# Patient Record
Sex: MALE | Race: WHITE | NOT HISPANIC OR LATINO | Employment: OTHER | URBAN - METROPOLITAN AREA
[De-identification: names, ages, dates, MRNs, and addresses within clinical notes are randomized per-mention and may not be internally consistent; named-entity substitution may affect disease eponyms.]

---

## 2017-04-09 ENCOUNTER — HOSPITAL ENCOUNTER (EMERGENCY)
Facility: HOSPITAL | Age: 57
Discharge: HOME | End: 2017-04-09
Attending: EMERGENCY MEDICINE | Admitting: EMERGENCY MEDICINE

## 2017-04-09 ENCOUNTER — APPOINTMENT (OUTPATIENT)
Dept: GENERAL RADIOLOGY | Facility: HOSPITAL | Age: 57
End: 2017-04-09

## 2017-04-09 VITALS
TEMPERATURE: 97.7 F | WEIGHT: 210 LBS | RESPIRATION RATE: 18 BRPM | DIASTOLIC BLOOD PRESSURE: 79 MMHG | HEART RATE: 59 BPM | BODY MASS INDEX: 28.44 KG/M2 | HEIGHT: 72 IN | OXYGEN SATURATION: 97 % | SYSTOLIC BLOOD PRESSURE: 117 MMHG

## 2017-04-09 DIAGNOSIS — I42.2 HYPERTROPHIC CARDIOMYOPATHY (HCC): ICD-10-CM

## 2017-04-09 DIAGNOSIS — I50.23 ACUTE ON CHRONIC SYSTOLIC CONGESTIVE HEART FAILURE (HCC): Primary | ICD-10-CM

## 2017-04-09 LAB
ALBUMIN SERPL-MCNC: 4.4 G/DL (ref 3.5–5.2)
ALBUMIN/GLOB SERPL: 1.5 G/DL
ALP SERPL-CCNC: 54 U/L (ref 39–117)
ALT SERPL W P-5'-P-CCNC: 18 U/L (ref 1–41)
ANION GAP SERPL CALCULATED.3IONS-SCNC: 14 MMOL/L
AST SERPL-CCNC: 24 U/L (ref 1–40)
BASOPHILS # BLD AUTO: 0.03 10*3/MM3 (ref 0–0.2)
BASOPHILS NFR BLD AUTO: 0.5 % (ref 0–1.5)
BILIRUB SERPL-MCNC: 0.5 MG/DL (ref 0.1–1.2)
BUN BLD-MCNC: 20 MG/DL (ref 6–20)
BUN/CREAT SERPL: 17.9 (ref 7–25)
CALCIUM SPEC-SCNC: 9.4 MG/DL (ref 8.6–10.5)
CHLORIDE SERPL-SCNC: 101 MMOL/L (ref 98–107)
CO2 SERPL-SCNC: 27 MMOL/L (ref 22–29)
CREAT BLD-MCNC: 1.12 MG/DL (ref 0.76–1.27)
DEPRECATED RDW RBC AUTO: 49.9 FL (ref 37–54)
EOSINOPHIL # BLD AUTO: 0.21 10*3/MM3 (ref 0–0.7)
EOSINOPHIL NFR BLD AUTO: 3.4 % (ref 0.3–6.2)
ERYTHROCYTE [DISTWIDTH] IN BLOOD BY AUTOMATED COUNT: 14.8 % (ref 11.5–14.5)
GFR SERPL CREATININE-BSD FRML MDRD: 68 ML/MIN/1.73
GLOBULIN UR ELPH-MCNC: 2.9 GM/DL
GLUCOSE BLD-MCNC: 101 MG/DL (ref 65–99)
HCT VFR BLD AUTO: 41.2 % (ref 40.4–52.2)
HGB BLD-MCNC: 13.4 G/DL (ref 13.7–17.6)
HOLD SPECIMEN: NORMAL
IMM GRANULOCYTES # BLD: 0.02 10*3/MM3 (ref 0–0.03)
IMM GRANULOCYTES NFR BLD: 0.3 % (ref 0–0.5)
INR PPP: 1.42 (ref 0.9–1.1)
LYMPHOCYTES # BLD AUTO: 1.24 10*3/MM3 (ref 0.9–4.8)
LYMPHOCYTES NFR BLD AUTO: 20 % (ref 19.6–45.3)
MCH RBC QN AUTO: 29.8 PG (ref 27–32.7)
MCHC RBC AUTO-ENTMCNC: 32.5 G/DL (ref 32.6–36.4)
MCV RBC AUTO: 91.8 FL (ref 79.8–96.2)
MONOCYTES # BLD AUTO: 0.42 10*3/MM3 (ref 0.2–1.2)
MONOCYTES NFR BLD AUTO: 6.8 % (ref 5–12)
NEUTROPHILS # BLD AUTO: 4.27 10*3/MM3 (ref 1.9–8.1)
NEUTROPHILS NFR BLD AUTO: 69 % (ref 42.7–76)
NT-PROBNP SERPL-MCNC: 1243 PG/ML (ref 5–900)
PLATELET # BLD AUTO: 140 10*3/MM3 (ref 140–500)
PMV BLD AUTO: 10.4 FL (ref 6–12)
POTASSIUM BLD-SCNC: 4.3 MMOL/L (ref 3.5–5.2)
PROT SERPL-MCNC: 7.3 G/DL (ref 6–8.5)
PROTHROMBIN TIME: 16.8 SECONDS (ref 11.7–14.2)
RBC # BLD AUTO: 4.49 10*6/MM3 (ref 4.6–6)
SODIUM BLD-SCNC: 142 MMOL/L (ref 136–145)
TROPONIN T SERPL-MCNC: <0.01 NG/ML (ref 0–0.03)
WBC NRBC COR # BLD: 6.19 10*3/MM3 (ref 4.5–10.7)
WHOLE BLOOD HOLD SPECIMEN: NORMAL

## 2017-04-09 PROCEDURE — 85610 PROTHROMBIN TIME: CPT | Performed by: EMERGENCY MEDICINE

## 2017-04-09 PROCEDURE — 80053 COMPREHEN METABOLIC PANEL: CPT | Performed by: EMERGENCY MEDICINE

## 2017-04-09 PROCEDURE — 36415 COLL VENOUS BLD VENIPUNCTURE: CPT | Performed by: EMERGENCY MEDICINE

## 2017-04-09 PROCEDURE — 25010000002 FUROSEMIDE PER 20 MG: Performed by: EMERGENCY MEDICINE

## 2017-04-09 PROCEDURE — 71020 HC CHEST PA AND LATERAL: CPT

## 2017-04-09 PROCEDURE — 93005 ELECTROCARDIOGRAM TRACING: CPT

## 2017-04-09 PROCEDURE — 84484 ASSAY OF TROPONIN QUANT: CPT | Performed by: EMERGENCY MEDICINE

## 2017-04-09 PROCEDURE — 83880 ASSAY OF NATRIURETIC PEPTIDE: CPT | Performed by: EMERGENCY MEDICINE

## 2017-04-09 PROCEDURE — 93010 ELECTROCARDIOGRAM REPORT: CPT | Performed by: INTERNAL MEDICINE

## 2017-04-09 PROCEDURE — 85025 COMPLETE CBC W/AUTO DIFF WBC: CPT | Performed by: EMERGENCY MEDICINE

## 2017-04-09 PROCEDURE — 99284 EMERGENCY DEPT VISIT MOD MDM: CPT

## 2017-04-09 PROCEDURE — 96374 THER/PROPH/DIAG INJ IV PUSH: CPT

## 2017-04-09 RX ORDER — ASPIRIN 325 MG
325 TABLET ORAL ONCE
Status: DISCONTINUED | OUTPATIENT
Start: 2017-04-09 | End: 2017-04-09

## 2017-04-09 RX ORDER — SODIUM CHLORIDE 0.9 % (FLUSH) 0.9 %
10 SYRINGE (ML) INJECTION AS NEEDED
Status: DISCONTINUED | OUTPATIENT
Start: 2017-04-09 | End: 2017-04-09 | Stop reason: HOSPADM

## 2017-04-09 RX ORDER — GABAPENTIN 300 MG/1
300 CAPSULE ORAL 3 TIMES DAILY
COMMUNITY

## 2017-04-09 RX ORDER — LORAZEPAM 1 MG/1
1 TABLET ORAL ONCE
Status: COMPLETED | OUTPATIENT
Start: 2017-04-09 | End: 2017-04-09

## 2017-04-09 RX ORDER — LORAZEPAM 0.5 MG/1
0.5 TABLET ORAL 2 TIMES DAILY
Qty: 6 TABLET | Refills: 0 | Status: SHIPPED | OUTPATIENT
Start: 2017-04-09

## 2017-04-09 RX ORDER — WARFARIN SODIUM 5 MG/1
7.5 TABLET ORAL
COMMUNITY

## 2017-04-09 RX ORDER — FUROSEMIDE 10 MG/ML
40 INJECTION INTRAMUSCULAR; INTRAVENOUS ONCE
Status: COMPLETED | OUTPATIENT
Start: 2017-04-09 | End: 2017-04-09

## 2017-04-09 RX ADMIN — FUROSEMIDE 40 MG: 10 INJECTION, SOLUTION INTRAMUSCULAR; INTRAVENOUS at 17:33

## 2017-04-09 RX ADMIN — LORAZEPAM 1 MG: 1 TABLET ORAL at 17:27

## 2017-04-09 NOTE — ED NOTES
Pt states he has gained 20 pounds over the last 4 days.  Pt also started having cp today.     Teetee Acuña RN  04/09/17 4748

## 2017-04-09 NOTE — ED PROVIDER NOTES
EMERGENCY DEPARTMENT ENCOUNTER    CHIEF COMPLAINT  Chief Complaint: Edema  History given by: Pt  History limited by: None  Room Number: 11/11  PMD: Duke Razo DO  Cardiologist - Dr. Bear    HPI:  Pt is a 56 y.o. male with a history of CHF and hypertrophic cardio myelopathy  who presents complaining of BLE and abdominal edema onset 1 week ago. He states he has gained 20 pounds over the last 4 days. He also c/o SOB and chest pain. Pt denies nausea, vomiting, urinary symptoms or any other complaints. He also states he feels anxious and requests ativan.     Duration:  1 week  Onset: gradual  Timing: constant  Location: abd and BLE  Radiation: none  Quality: edema  Intensity/Severity: moderate  Progression: gradually worsening  Associated Symptoms: SOB, chest pain, weight gain  Aggravating Factors: none  Alleviating Factors: none  Previous Episodes: hx of  CHF and hypertrophic cardio myelopathy  Treatment before arrival: none    PAST MEDICAL HISTORY  Active Ambulatory Problems     Diagnosis Date Noted   • Elevated alanine aminotransferase (ALT) level 01/21/2016   • Anemia 01/21/2016   • Anxiety 01/21/2016   • Coronary arteriosclerosis in native artery 01/21/2016   • Cobalamin deficiency 01/21/2016   • Mass of breast 01/21/2016   • Chronic kidney disease 01/21/2016   • Congestive heart failure 01/21/2016   • Constipation 01/21/2016   • Gastroesophageal reflux disease 01/21/2016   • Fatigue 01/21/2016   • Gastric ulcer 01/21/2016   • Gynecomastia 01/21/2016   • History of alcohol abuse 01/21/2016   • Hyperlipidemia 01/21/2016   • Hypogonadism in male 01/21/2016   • Major depressive disorder, recurrent episode 01/21/2016   • Primary insomnia 01/21/2016   • Temporary cerebral vascular dysfunction 01/21/2016   • Essential hypertension 01/21/2016   • Benzodiazepine misuse 03/18/2016     Resolved Ambulatory Problems     Diagnosis Date Noted   • No Resolved Ambulatory Problems     Past Medical History:   Diagnosis  Date   • Alcoholism    • Atrial fibrillation    • Constipation    • Depression    • Depression with anxiety    • Obstructive hypertrophic cardiomyopathy    • Persistent insomnia    • Solitary pulmonary nodule on lung CT        PAST SURGICAL HISTORY  Past Surgical History:   Procedure Laterality Date   • ADENOIDECTOMY     • CARDIAC SURGERY     • HEMORRHOIDECTOMY     • OTHER SURGICAL HISTORY      PTCA: DIAGNOSTIC CATH CORONARY DOMINANCE   • OTHER SURGICAL HISTORY      TONSILLECTOMY WITH ADENOIDECTOMY   • TONSILLECTOMY         FAMILY HISTORY  Family History   Problem Relation Age of Onset   • Cardiomyopathy Brother        SOCIAL HISTORY  Social History     Social History   • Marital status: Single     Spouse name: N/A   • Number of children: N/A   • Years of education: N/A     Occupational History   • Not on file.     Social History Main Topics   • Smoking status: Current Every Day Smoker     Packs/day: 1.00     Types: Cigarettes   • Smokeless tobacco: Not on file   • Alcohol use Yes      Comment: former alcoholic. drinks occassionally.   • Drug use: No   • Sexual activity: Defer     Other Topics Concern   • Not on file     Social History Narrative   • No narrative on file       ALLERGIES  Review of patient's allergies indicates no known allergies.    REVIEW OF SYSTEMS  Review of Systems   Constitutional: Positive for unexpected weight change ( gain). Negative for activity change, appetite change and fever.   HENT: Negative for congestion and sore throat.    Eyes: Negative.    Respiratory: Positive for shortness of breath. Negative for cough.    Cardiovascular: Positive for chest pain and leg swelling.   Gastrointestinal: Positive for abdominal distention. Negative for abdominal pain, diarrhea and vomiting.   Endocrine: Negative.    Genitourinary: Negative for decreased urine volume and dysuria.   Musculoskeletal: Negative for neck pain.   Skin: Negative for rash and wound.   Allergic/Immunologic: Negative.     Neurological: Negative for weakness, numbness and headaches.   Hematological: Negative.    Psychiatric/Behavioral: The patient is nervous/anxious ( mild).    All other systems reviewed and are negative.      PHYSICAL EXAM  ED Triage Vitals   Temp Heart Rate Resp BP SpO2   04/09/17 1526 04/09/17 1526 04/09/17 1526 04/09/17 1529 04/09/17 1526   97.7 °F (36.5 °C) 77 18 151/100 98 %      Temp src Heart Rate Source Patient Position BP Location FiO2 (%)   04/09/17 1526 04/09/17 1526 04/09/17 1653 04/09/17 1653 --   Tympanic Monitor Sitting Right arm        Physical Exam   Constitutional: He is oriented to person, place, and time and well-developed, well-nourished, and in no distress.   HENT:   Head: Normocephalic and atraumatic.   Eyes: EOM are normal. Pupils are equal, round, and reactive to light.   Neck: Normal range of motion. Neck supple. No JVD present. Carotid bruit is not present.   Cardiovascular: Normal rate, regular rhythm, normal heart sounds and intact distal pulses.    Pulmonary/Chest: Effort normal and breath sounds normal. No respiratory distress.   Abdominal: Soft. There is no tenderness. There is no rebound and no guarding.   Musculoskeletal: Normal range of motion. He exhibits edema ( mild, at bilateral ankles).        Right lower leg: He exhibits no tenderness.        Left lower leg: He exhibits no tenderness.   Neurological: He is alert and oriented to person, place, and time. He has normal sensation and normal strength.   Skin: Skin is warm and dry.   Psychiatric: Mood and affect normal.   Nursing note and vitals reviewed.      LAB RESULTS  Lab Results (last 24 hours)     Procedure Component Value Units Date/Time    CBC & Differential [[id number]] Collected:  04/09/17 1549    Specimen:  Blood Updated:  04/09/17 1608    Narrative:       The following orders were created for panel order CBC & Differential.  Procedure                               Abnormality         Status                     ---------                                -----------         ------                     CBC Auto Differential[[id number]]         Abnormal            Final result                 Please view results for these tests on the individual orders.    Comprehensive Metabolic Panel [[id number]]  (Abnormal) Collected:  04/09/17 1549    Specimen:  Blood Updated:  04/09/17 1622     Glucose 101 (H) mg/dL      BUN 20 mg/dL      Creatinine 1.12 mg/dL      Sodium 142 mmol/L      Potassium 4.3 mmol/L      Chloride 101 mmol/L      CO2 27.0 mmol/L      Calcium 9.4 mg/dL      Total Protein 7.3 g/dL      Albumin 4.40 g/dL      ALT (SGPT) 18 U/L      AST (SGOT) 24 U/L      Alkaline Phosphatase 54 U/L      Total Bilirubin 0.5 mg/dL      eGFR Non African Amer 68 mL/min/1.73      Globulin 2.9 gm/dL      A/G Ratio 1.5 g/dL      BUN/Creatinine Ratio 17.9     Anion Gap 14.0 mmol/L     Troponin [[id number]]  (Normal) Collected:  04/09/17 1549    Specimen:  Blood Updated:  04/09/17 1622     Troponin T <0.010 ng/mL     Narrative:       Troponin T Reference Ranges:  Less than 0.03 ng/mL:    Negative for AMI  0.03 to 0.09 ng/mL:      Indeterminant for AMI  Greater than 0.09 ng/mL: Positive for AMI    Protime-INR [[id number]]  (Abnormal) Collected:  04/09/17 1549    Specimen:  Blood Updated:  04/09/17 1620     Protime 16.8 (H) Seconds      INR 1.42 (H)    CBC Auto Differential [[id number]]  (Abnormal) Collected:  04/09/17 1549    Specimen:  Blood Updated:  04/09/17 1608     WBC 6.19 10*3/mm3      RBC 4.49 (L) 10*6/mm3      Hemoglobin 13.4 (L) g/dL      Hematocrit 41.2 %      MCV 91.8 fL      MCH 29.8 pg      MCHC 32.5 (L) g/dL      RDW 14.8 (H) %      RDW-SD 49.9 fl      MPV 10.4 fL      Platelets 140 10*3/mm3      Neutrophil % 69.0 %      Lymphocyte % 20.0 %      Monocyte % 6.8 %      Eosinophil % 3.4 %      Basophil % 0.5 %      Immature Grans % 0.3 %      Neutrophils, Absolute 4.27 10*3/mm3      Lymphocytes, Absolute 1.24 10*3/mm3      Monocytes, Absolute 0.42  10*3/mm3      Eosinophils, Absolute 0.21 10*3/mm3      Basophils, Absolute 0.03 10*3/mm3      Immature Grans, Absolute 0.02 10*3/mm3     BNP [[id number]]  (Abnormal) Collected:  04/09/17 1549    Specimen:  Blood Updated:  04/09/17 1739     proBNP 1243.0 (H) pg/mL     Narrative:       Among patients with dyspnea, NT-proBNP is highly sensitive for the detection of acute congestive heart failure. In addition NT-proBNP of <300 pg/ml effectively rules out acute congestive heart failure with 99% negative predictive value.          I ordered the above labs and reviewed the results    RADIOLOGY  XR Chest 2 View              I ordered the above noted radiological studies. Interpreted by radiologist. Reviewed by me in PACS.       PROCEDURES  Procedures  EKG           EKG time: 1533  Rhythm/Rate: NSR, 70  P waves and AK: First degree AV block  Left atrial enlargement  QRS, axis: Poor R wave progression  LBBB   ST and T waves: ST changes     Interpreted Contemporaneously by me, independently viewed  unchanged compared to prior 6/1/15      PROGRESS AND CONSULTS  ED Course     1528  Ordered EKG, blood work and CXR for further evaluation. Ordered aspirin for chest pain.     1721  Ordered Ativan for anxiety, per pt's request, and Lasix for edema.     1759  Discussed all results including slightlying elevated BNP levels. Discussed plan to increase lasix and dishcarge to f/u with Dr. Bear (Cardiology). Pt understands and agrees with plan. All questions addressed.     MEDICAL DECISION MAKING  Results were reviewed/discussed with the patient and they were also made aware of online access. Pt also made aware that some labs, such as cultures, will not be resulted during ER visit and follow up with PMD is necessary.     MDM  Number of Diagnoses or Management Options  Acute on chronic systolic congestive heart failure:   Hypertrophic cardiomyopathy:      Amount and/or Complexity of Data Reviewed  Clinical lab tests: ordered and reviewed  (Pro BNP - 1243  Troponin <0.010)  Tests in the radiology section of CPT®: reviewed and ordered (CXR - negative)  Tests in the medicine section of CPT®: ordered and reviewed (See procedure note for EKG interpretation)  Decide to obtain previous medical records or to obtain history from someone other than the patient: yes           DIAGNOSIS  Final diagnoses:   Acute on chronic systolic congestive heart failure   Hypertrophic cardiomyopathy       DISPOSITION  DISCHARGE    Patient discharged in stable condition.    Reviewed implications of results, diagnosis, meds, responsibility to follow up, warning signs and symptoms of possible worsening, potential complications and reasons to return to ER.    Patient/Family voiced understanding of above instructions.    Discussed plan for discharge, as there is no emergent indication for admission.  Pt/family is agreeable and understands need for follow up and repeat testing.  Pt is aware that discharge does not mean that nothing is wrong but it indicates no emergency is present that requires admission and they must continue care with follow-up as given below or physician of their choice.     FOLLOW-UP  Duke Razo DO  [address]  [facility] [id number] [phone]    Schedule an appointment as soon as possible for a visit      Hu Bear MD  [address]  [facility] [id number] [phone]    Schedule an appointment as soon as possible for a visit           Medication List      New Prescriptions          LORazepam 0.5 MG tablet   Commonly known as:  ATIVAN   Take 1 tablet by mouth 2 (Two) Times a Day.         Stop          atorvastatin 80 MG tablet   Commonly known as:  LIPITOR       busPIRone 15 MG tablet   Commonly known as:  BUSPAR       docusate sodium 100 MG capsule   Commonly known as:  COLACE       hydrOXYzine 50 MG tablet   Commonly known as:  ATARAX       multivitamin capsule       pantoprazole 40 MG EC tablet   Commonly known as:   PROTONIX               Latest Documented Vital Signs:  As of 6:03 PM  BP- 114/78 HR- 59 Temp- 97.7 °F (36.5 °C) (Tympanic) O2 sat- 97%    --  Documentation assistance provided by franci Clarke for Dr. Rojo.  Information recorded by the scribe was done at my direction and has been verified and validated by me.          Tereza Clarke  04/09/17 0696       Cody Rojo MD  04/09/17 2043

## 2017-04-12 ENCOUNTER — TELEPHONE (OUTPATIENT)
Dept: SOCIAL WORK | Facility: HOSPITAL | Age: 57
End: 2017-04-12

## 2017-04-12 NOTE — TELEPHONE ENCOUNTER
ED follow-up phone call. States that he increased Lasix dose for three days as advised, and is taking prescribed Ativan. States he is feeling better. Reminded to make f/u ino't w/ both cardiologist and PCP. No questions/concerns

## 2017-05-07 ENCOUNTER — HOSPITAL ENCOUNTER (EMERGENCY)
Dept: HOSPITAL 23 - CED | Age: 57
Discharge: HOME | End: 2017-05-07
Payer: MEDICARE

## 2017-05-07 DIAGNOSIS — F41.9: ICD-10-CM

## 2017-05-07 DIAGNOSIS — F10.129: ICD-10-CM

## 2017-05-07 DIAGNOSIS — I10: ICD-10-CM

## 2017-05-07 DIAGNOSIS — R10.13: Primary | ICD-10-CM

## 2017-05-07 LAB
ALCOHOL BLOOD: 45 MG/DL
AMYLASE: 30 U/L
BARBITURATES UR QL SCN: 0.7 MG/DL
BARBITURATES UR QL SCN: 4.3 G/DL
BARBITURATES: (no result)
BASOPHIL#: 0.1 X10E3
BASOPHIL%: 2.1 %
BENZODIAZ UR QL SCN: 18 U/L
BENZODIAZ UR QL SCN: 22 U/L
BENZODIAZEPINES: (no result)
BLOOD UREA NITROGEN: 16 MG/DL
BUN/CREATININE RATIO: 12.3
BZE UR QL SCN: 50 U/L
CALCIUM SERUM: 8.8 MG/DL
CK MB SERPL-RTO: 14.9 %
CK MB SERPL-RTO: 32.3 G/DL
COCAINE: (no result)
CREATININE SERUM: 1.3 MG/DL
DIFF IND: NO
DX ICD CODE: (no result)
DX ICD CODE: (no result)
EOSINOPHIL#: 0.2 X10E3
EOSINOPHIL%: 4.2 %
GENTAMICIN PEAK SERPL-MCNC: NO MG/L
GLOM FILT RATE ESTIMATED: 61.4 ML/MIN
GLUCOSE FASTING: 93 MG/DL
HEMATOCRIT: 39.3 %
HEMOGLOBIN: 12.7 GM/DL
HIV1+2 AB SPEC QL IA.RAPID: 38.5 SECONDS
INR: 2.2
KETONES UR QL: 106 MMOL/L
KETONES UR QL: 26 MMOL/L
LIPASE: 49 U/L
LYMPHOCYTE#: 1.4 X10E3
LYMPHOCYTE%: 26.2 %
MEAN CELL VOLUME: 89.5 FL
MEAN CORPUSCULAR HEMOGLOBIN: 28.9 PG
MEAN PLATELET VOLUME: 7.6 FL
METHADONE UR QL SCN: 1.5 NG/ML
METHADONE UR QL SCN: 1.8 NG/ML
MONOCYTE#: 0.5 X10E3
MONOCYTE%: 9.2 %
NEUTROPHIL#: 3.2 X10E3
NEUTROPHIL%: 58.3 %
OPIATES: (no result)
PLATELET COUNT: 161 X10E3
POC - TROPONIN: <0.05 NG/ML
POC - TROPONIN: <0.05 NG/ML
POTASSIUM: 4.2 MMOL/L
PROTEIN TOTAL SERUM: 7.3 G/DL
PROTHROMBIN TIME (PATIENT): 24.1 SECONDS
RED BLOOD COUNT: 4.39 X10E
SODIUM: 140 MMOL/L
TRICYCLIC ANTIDEPRESSANTS: (no result)
U METHADONE: (no result)
URINE APPEARANCE: CLEAR
URINE BILIRUBIN: (no result)
URINE BLOOD: (no result)
URINE COLOR: YELLOW
URINE GLUCOSE: (no result) MG/DL
URINE KETONE: (no result)
URINE LEUKOCYTE ESTERASE: (no result)
URINE NITRATE: (no result)
URINE PH: 5
URINE PROTEIN: (no result)
URINE SOURCE: (no result)
URINE SPECIFIC GRAVITY: 1.01
URINE UROBILINOGEN: 0.2 MG/DL
WHITE BLOOD COUNT: 5.5 X10E3

## 2017-05-07 PROCEDURE — C9113 INJ PANTOPRAZOLE SODIUM, VIA: HCPCS

## 2017-05-07 PROCEDURE — G0480 DRUG TEST DEF 1-7 CLASSES: HCPCS

## 2017-08-06 ENCOUNTER — APPOINTMENT (OUTPATIENT)
Dept: GENERAL RADIOLOGY | Facility: HOSPITAL | Age: 57
End: 2017-08-06

## 2017-08-06 ENCOUNTER — APPOINTMENT (OUTPATIENT)
Dept: CT IMAGING | Facility: HOSPITAL | Age: 57
End: 2017-08-06

## 2017-08-06 ENCOUNTER — HOSPITAL ENCOUNTER (EMERGENCY)
Facility: HOSPITAL | Age: 57
Discharge: HOME | End: 2017-08-06
Attending: EMERGENCY MEDICINE | Admitting: EMERGENCY MEDICINE

## 2017-08-06 VITALS
DIASTOLIC BLOOD PRESSURE: 87 MMHG | WEIGHT: 195 LBS | BODY MASS INDEX: 26.41 KG/M2 | OXYGEN SATURATION: 98 % | TEMPERATURE: 98.4 F | SYSTOLIC BLOOD PRESSURE: 134 MMHG | HEART RATE: 62 BPM | RESPIRATION RATE: 17 BRPM | HEIGHT: 72 IN

## 2017-08-06 DIAGNOSIS — F41.9 ANXIETY: ICD-10-CM

## 2017-08-06 DIAGNOSIS — S39.012A LUMBAR STRAIN, INITIAL ENCOUNTER: Primary | ICD-10-CM

## 2017-08-06 DIAGNOSIS — R00.2 PALPITATIONS: ICD-10-CM

## 2017-08-06 LAB
ALBUMIN SERPL-MCNC: 4.9 G/DL (ref 3.5–5.2)
ALBUMIN/GLOB SERPL: 1.8 G/DL
ALP SERPL-CCNC: 45 U/L (ref 39–117)
ALT SERPL W P-5'-P-CCNC: 21 U/L (ref 1–41)
ANION GAP SERPL CALCULATED.3IONS-SCNC: 16.9 MMOL/L
AST SERPL-CCNC: 18 U/L (ref 1–40)
BASOPHILS # BLD AUTO: 0.03 10*3/MM3 (ref 0–0.2)
BASOPHILS NFR BLD AUTO: 0.5 % (ref 0–1.5)
BILIRUB SERPL-MCNC: 0.8 MG/DL (ref 0.1–1.2)
BILIRUB UR QL STRIP: NEGATIVE
BUN BLD-MCNC: 14 MG/DL (ref 6–20)
BUN/CREAT SERPL: 13.7 (ref 7–25)
CALCIUM SPEC-SCNC: 10 MG/DL (ref 8.6–10.5)
CHLORIDE SERPL-SCNC: 99 MMOL/L (ref 98–107)
CLARITY UR: CLEAR
CO2 SERPL-SCNC: 24.1 MMOL/L (ref 22–29)
COLOR UR: ABNORMAL
CREAT BLD-MCNC: 1.02 MG/DL (ref 0.76–1.27)
DEPRECATED RDW RBC AUTO: 46.9 FL (ref 37–54)
EOSINOPHIL # BLD AUTO: 0.14 10*3/MM3 (ref 0–0.7)
EOSINOPHIL NFR BLD AUTO: 2.5 % (ref 0.3–6.2)
ERYTHROCYTE [DISTWIDTH] IN BLOOD BY AUTOMATED COUNT: 14 % (ref 11.5–14.5)
GFR SERPL CREATININE-BSD FRML MDRD: 76 ML/MIN/1.73
GLOBULIN UR ELPH-MCNC: 2.7 GM/DL
GLUCOSE BLD-MCNC: 88 MG/DL (ref 65–99)
GLUCOSE UR STRIP-MCNC: NEGATIVE MG/DL
HCT VFR BLD AUTO: 42.8 % (ref 40.4–52.2)
HGB BLD-MCNC: 13.8 G/DL (ref 13.7–17.6)
HGB UR QL STRIP.AUTO: NEGATIVE
HOLD SPECIMEN: NORMAL
HOLD SPECIMEN: NORMAL
IMM GRANULOCYTES # BLD: 0 10*3/MM3 (ref 0–0.03)
IMM GRANULOCYTES NFR BLD: 0 % (ref 0–0.5)
INR PPP: 2.48 (ref 0.9–1.1)
KETONES UR QL STRIP: ABNORMAL
LEUKOCYTE ESTERASE UR QL STRIP.AUTO: NEGATIVE
LYMPHOCYTES # BLD AUTO: 1.08 10*3/MM3 (ref 0.9–4.8)
LYMPHOCYTES NFR BLD AUTO: 19.2 % (ref 19.6–45.3)
MCH RBC QN AUTO: 29.4 PG (ref 27–32.7)
MCHC RBC AUTO-ENTMCNC: 32.2 G/DL (ref 32.6–36.4)
MCV RBC AUTO: 91.1 FL (ref 79.8–96.2)
MONOCYTES # BLD AUTO: 0.32 10*3/MM3 (ref 0.2–1.2)
MONOCYTES NFR BLD AUTO: 5.7 % (ref 5–12)
NEUTROPHILS # BLD AUTO: 4.05 10*3/MM3 (ref 1.9–8.1)
NEUTROPHILS NFR BLD AUTO: 72.1 % (ref 42.7–76)
NITRITE UR QL STRIP: NEGATIVE
PH UR STRIP.AUTO: 6 [PH] (ref 5–8)
PLATELET # BLD AUTO: 184 10*3/MM3 (ref 140–500)
PMV BLD AUTO: 9.9 FL (ref 6–12)
POTASSIUM BLD-SCNC: 4.3 MMOL/L (ref 3.5–5.2)
PROT SERPL-MCNC: 7.6 G/DL (ref 6–8.5)
PROT UR QL STRIP: NEGATIVE
PROTHROMBIN TIME: 26.1 SECONDS (ref 11.7–14.2)
RBC # BLD AUTO: 4.7 10*6/MM3 (ref 4.6–6)
SODIUM BLD-SCNC: 140 MMOL/L (ref 136–145)
SP GR UR STRIP: >=1.03 (ref 1–1.03)
TROPONIN T SERPL-MCNC: <0.01 NG/ML (ref 0–0.03)
UROBILINOGEN UR QL STRIP: ABNORMAL
WBC NRBC COR # BLD: 5.62 10*3/MM3 (ref 4.5–10.7)
WHOLE BLOOD HOLD SPECIMEN: NORMAL
WHOLE BLOOD HOLD SPECIMEN: NORMAL

## 2017-08-06 PROCEDURE — 96374 THER/PROPH/DIAG INJ IV PUSH: CPT

## 2017-08-06 PROCEDURE — 93010 ELECTROCARDIOGRAM REPORT: CPT | Performed by: INTERNAL MEDICINE

## 2017-08-06 PROCEDURE — 81003 URINALYSIS AUTO W/O SCOPE: CPT | Performed by: EMERGENCY MEDICINE

## 2017-08-06 PROCEDURE — 85610 PROTHROMBIN TIME: CPT

## 2017-08-06 PROCEDURE — 99284 EMERGENCY DEPT VISIT MOD MDM: CPT

## 2017-08-06 PROCEDURE — 25010000002 MORPHINE PER 10 MG: Performed by: EMERGENCY MEDICINE

## 2017-08-06 PROCEDURE — 93005 ELECTROCARDIOGRAM TRACING: CPT

## 2017-08-06 PROCEDURE — 71020 HC CHEST PA AND LATERAL: CPT

## 2017-08-06 PROCEDURE — 74176 CT ABD & PELVIS W/O CONTRAST: CPT

## 2017-08-06 PROCEDURE — 80053 COMPREHEN METABOLIC PANEL: CPT

## 2017-08-06 PROCEDURE — 85025 COMPLETE CBC W/AUTO DIFF WBC: CPT

## 2017-08-06 PROCEDURE — 84484 ASSAY OF TROPONIN QUANT: CPT

## 2017-08-06 RX ORDER — ASPIRIN 325 MG
325 TABLET ORAL ONCE
Status: DISCONTINUED | OUTPATIENT
Start: 2017-08-06 | End: 2017-08-06 | Stop reason: HOSPADM

## 2017-08-06 RX ORDER — SODIUM CHLORIDE 0.9 % (FLUSH) 0.9 %
10 SYRINGE (ML) INJECTION AS NEEDED
Status: DISCONTINUED | OUTPATIENT
Start: 2017-08-06 | End: 2017-08-06 | Stop reason: HOSPADM

## 2017-08-06 RX ORDER — ALPRAZOLAM 0.25 MG/1
0.25 TABLET ORAL ONCE
Status: COMPLETED | OUTPATIENT
Start: 2017-08-06 | End: 2017-08-06

## 2017-08-06 RX ORDER — METAXALONE 800 MG/1
800 TABLET ORAL 3 TIMES DAILY PRN
Qty: 15 TABLET | Refills: 0 | Status: SHIPPED | OUTPATIENT
Start: 2017-08-06

## 2017-08-06 RX ORDER — MORPHINE SULFATE 2 MG/ML
2 INJECTION, SOLUTION INTRAMUSCULAR; INTRAVENOUS ONCE
Status: COMPLETED | OUTPATIENT
Start: 2017-08-06 | End: 2017-08-06

## 2017-08-06 RX ADMIN — MORPHINE SULFATE 2 MG: 2 INJECTION, SOLUTION INTRAMUSCULAR; INTRAVENOUS at 14:38

## 2017-08-06 RX ADMIN — ALPRAZOLAM 0.25 MG: 0.25 TABLET ORAL at 14:02

## 2017-08-06 NOTE — ED PROVIDER NOTES
" EMERGENCY DEPARTMENT ENCOUNTER    CHIEF COMPLAINT  Chief Complaint: R lumbar pain  History given by: Pt  History limited by: Nothing  Room Number: 23/23  PMD: No Known Provider  PCP: Dr. Razo    HPI:  Pt is a 57 y.o. male who presents complaining of R lumbar pain onset three days ago. Pt states his pain is exacerbated with movement and leaning forward. He also complains of nausea and resolved RLQ pain but denies CP, SOA, weakness, numbness, incontinence and any other symptoms at this time. He reports he experienced palpitations one day ago that have resolved. Pt has a hx of nephrolithiasis. He states he is currently on Coumadin for hx of A-Fib.    Duration:  Three days  Onset: gradual  Timing: constant  Location: R  Radiation: none  Quality: \"pain\"  Intensity/Severity: moderate  Progression: unchanged  Associated Symptoms: nausea, resolved RLQ abd pain, palpitations  Aggravating Factors: movement, leaning forward  Alleviating Factors: none  Previous Episodes: Pt has a hx of nephrolithiasis.  Treatment before arrival: Pt received no treatment PTA.    PAST MEDICAL HISTORY  Active Ambulatory Problems     Diagnosis Date Noted   • Elevated alanine aminotransferase (ALT) level 01/21/2016   • Anemia 01/21/2016   • Anxiety 01/21/2016   • Coronary arteriosclerosis in native artery 01/21/2016   • Cobalamin deficiency 01/21/2016   • Mass of breast 01/21/2016   • Chronic kidney disease 01/21/2016   • Congestive heart failure 01/21/2016   • Constipation 01/21/2016   • Gastroesophageal reflux disease 01/21/2016   • Fatigue 01/21/2016   • Gastric ulcer 01/21/2016   • Gynecomastia 01/21/2016   • History of alcohol abuse 01/21/2016   • Hyperlipidemia 01/21/2016   • Hypogonadism in male 01/21/2016   • Major depressive disorder, recurrent episode 01/21/2016   • Primary insomnia 01/21/2016   • Temporary cerebral vascular dysfunction 01/21/2016   • Essential hypertension 01/21/2016   • Benzodiazepine misuse 03/18/2016     Resolved " Ambulatory Problems     Diagnosis Date Noted   • No Resolved Ambulatory Problems     Past Medical History:   Diagnosis Date   • Alcoholism    • Atrial fibrillation    • Constipation    • Depression    • Depression with anxiety    • Obstructive hypertrophic cardiomyopathy    • Persistent insomnia    • Solitary pulmonary nodule on lung CT        PAST SURGICAL HISTORY  Past Surgical History:   Procedure Laterality Date   • ADENOIDECTOMY     • CARDIAC SURGERY     • HEMORRHOIDECTOMY     • OTHER SURGICAL HISTORY      PTCA: DIAGNOSTIC CATH CORONARY DOMINANCE   • OTHER SURGICAL HISTORY      TONSILLECTOMY WITH ADENOIDECTOMY   • TONSILLECTOMY         FAMILY HISTORY  Family History   Problem Relation Age of Onset   • Cardiomyopathy Brother        SOCIAL HISTORY  Social History     Social History   • Marital status: Single     Spouse name: N/A   • Number of children: N/A   • Years of education: N/A     Occupational History   • Not on file.     Social History Main Topics   • Smoking status: Current Every Day Smoker     Packs/day: 1.00     Types: Cigarettes   • Smokeless tobacco: Not on file   • Alcohol use Yes      Comment: former alcoholic. drinks occassionally.   • Drug use: No   • Sexual activity: Defer     Other Topics Concern   • Not on file     Social History Narrative       ALLERGIES  Review of patient's allergies indicates no known allergies.    REVIEW OF SYSTEMS  Review of Systems   Constitutional: Negative for activity change, appetite change and fever.   HENT: Negative for congestion and sore throat.    Eyes: Negative.    Respiratory: Negative for cough and shortness of breath.    Cardiovascular: Positive for palpitations (resolved). Negative for chest pain and leg swelling.   Gastrointestinal: Positive for abdominal pain (RLQ (resolved)) and nausea. Negative for diarrhea and vomiting.   Endocrine: Negative.    Genitourinary: Negative for decreased urine volume and dysuria.   Musculoskeletal: Positive for back pain (R  lumbar). Negative for neck pain.   Skin: Negative for rash and wound.   Allergic/Immunologic: Negative.    Neurological: Negative for weakness, numbness and headaches.   Hematological: Negative.    Psychiatric/Behavioral: Negative.    All other systems reviewed and are negative.      PHYSICAL EXAM  ED Triage Vitals   Temp Heart Rate Resp BP SpO2   08/06/17 1101 08/06/17 1101 08/06/17 1101 08/06/17 1123 08/06/17 1101   97.8 °F (36.6 °C) 61 18 138/96 99 %      Temp src Heart Rate Source Patient Position BP Location FiO2 (%)   08/06/17 1101 -- 08/06/17 1123 08/06/17 1123 --   Tympanic  Sitting Right arm        Physical Exam   Constitutional: He is oriented to person, place, and time.  Non-toxic appearance. He appears distressed (mildly).   HENT:   Head: Normocephalic and atraumatic.   Eyes: EOM are normal.   Neck: Normal range of motion.   Cardiovascular: Normal rate and regular rhythm.    Murmur heard.   Systolic murmur is present with a grade of 2/6   Pulses:       Posterior tibial pulses are 2+ on the right side, and 2+ on the left side.   Pulmonary/Chest: Effort normal and breath sounds normal. No respiratory distress. He has no wheezes.   Abdominal: Soft. Bowel sounds are normal. There is no tenderness. There is no rebound, no guarding and no CVA tenderness.   Musculoskeletal: Normal range of motion. He exhibits no edema.        Lumbar back: He exhibits tenderness (right lumbar musculature). He exhibits no bony tenderness.   Strength, ROM, sens intact bilat lower ext   Neurological: He is alert and oriented to person, place, and time.   Pt has negative straight leg raises and intact pulses.   Skin: Skin is warm and dry.   Psychiatric: Affect normal.   Nursing note and vitals reviewed.      LAB RESULTS  Lab Results (last 24 hours)     Procedure Component Value Units Date/Time    CBC & Differential [[id number]] Collected:  08/06/17 1205    Specimen:  Blood Updated:  08/06/17 1224    Narrative:       The following  orders were created for panel order CBC & Differential.  Procedure                               Abnormality         Status                     ---------                               -----------         ------                     CBC Auto Differential[[id number]]        Abnormal            Final result                 Please view results for these tests on the individual orders.    Comprehensive Metabolic Panel [[id number]] Collected:  08/06/17 1205    Specimen:  Blood Updated:  08/06/17 1244     Glucose 88 mg/dL      BUN 14 mg/dL      Creatinine 1.02 mg/dL      Sodium 140 mmol/L      Potassium 4.3 mmol/L      Chloride 99 mmol/L      CO2 24.1 mmol/L      Calcium 10.0 mg/dL      Total Protein 7.6 g/dL      Albumin 4.90 g/dL      ALT (SGPT) 21 U/L      AST (SGOT) 18 U/L      Alkaline Phosphatase 45 U/L      Total Bilirubin 0.8 mg/dL      eGFR Non African Amer 76 mL/min/1.73      Globulin 2.7 gm/dL      A/G Ratio 1.8 g/dL      BUN/Creatinine Ratio 13.7     Anion Gap 16.9 mmol/L     Troponin [[id number]]  (Normal) Collected:  08/06/17 1205    Specimen:  Blood Updated:  08/06/17 1244     Troponin T <0.010 ng/mL     Narrative:       Troponin T Reference Ranges:  Less than 0.03 ng/mL:    Negative for AMI  0.03 to 0.09 ng/mL:      Indeterminant for AMI  Greater than 0.09 ng/mL: Positive for AMI    Protime-INR [[id number]]  (Abnormal) Collected:  08/06/17 1205    Specimen:  Blood Updated:  08/06/17 1236     Protime 26.1 (H) Seconds      INR 2.48 (H)    CBC Auto Differential [[id number]]  (Abnormal) Collected:  08/06/17 1205    Specimen:  Blood Updated:  08/06/17 1224     WBC 5.62 10*3/mm3      RBC 4.70 10*6/mm3      Hemoglobin 13.8 g/dL      Hematocrit 42.8 %      MCV 91.1 fL      MCH 29.4 pg      MCHC 32.2 (L) g/dL      RDW 14.0 %      RDW-SD 46.9 fl      MPV 9.9 fL      Platelets 184 10*3/mm3      Neutrophil % 72.1 %      Lymphocyte % 19.2 (L) %      Monocyte % 5.7 %      Eosinophil % 2.5 %      Basophil % 0.5 %      Immature  Grans % 0.0 %      Neutrophils, Absolute 4.05 10*3/mm3      Lymphocytes, Absolute 1.08 10*3/mm3      Monocytes, Absolute 0.32 10*3/mm3      Eosinophils, Absolute 0.14 10*3/mm3      Basophils, Absolute 0.03 10*3/mm3      Immature Grans, Absolute 0.00 10*3/mm3     Urinalysis With / Culture If Indicated [[id number]]  (Abnormal) Collected:  08/06/17 1241    Specimen:  Urine from Urine, Clean Catch Updated:  08/06/17 1300     Color, UA Dark Yellow (A)     Appearance, UA Clear     pH, UA 6.0     Specific Gravity, UA >=1.030     Glucose, UA Negative     Ketones, UA 15 mg/dL (1+) (A)     Bilirubin, UA Negative     Blood, UA Negative     Protein, UA Negative     Leuk Esterase, UA Negative     Nitrite, UA Negative     Urobilinogen, UA 0.2 E.U./dL    Narrative:       Urine microscopic not indicated.          I ordered the above labs and reviewed the results    RADIOLOGY  CT Abdomen Pelvis Without Contrast   Final Result           1. No urolithiasis or hydronephrosis. Mild bilateral perinephric fat   stranding suggests chronic change, correlate clinically to exclude   possibility of urinary infection.   2. No acute process of bowel is identified, follow-up as indications   persist.   3. Indeterminate liver and spleen low densities. Spleen is enlarged.   Enlarged prostate.       Discussed by telephone with Dr. Ken at time of interpretation, 1440 on   08/06/2017.       This report was finalized on 8/6/2017 2:47 PM by Dr. Dustin Huffman MD.          XR Chest 2 View   Final Result   No focal pulmonary consolidation. COPD change. Follow-up as   clinical indications persist.       This report was finalized on 8/6/2017 1:13 PM by Dr. Dustin Huffman MD.               I ordered the above noted radiological studies. Interpreted by radiologist. Reviewed by me in PACS.       PROCEDURES  Procedures      PROGRESS AND CONSULTS  ED Course     1158 Ordered EKG, XR Chest, Troponin, CMP, CBC, Protime-INR for further  "evaluation.    1200 Ordered 325mg ASA for pain relief.    1358 Ordered CT Abd/Pel for further evaluation. Pt requested \"something for his nerves\", declined pain meds, ordered .25mg Xanax for anxiety relief.    1426 nursing reports pt requesting pain meds - ordered 2mg morphine for pain relief.    1540 BP- 147/98 HR- 60 Temp- 97.8 °F (36.6 °C) (Tympanic) O2 sat- 96%. Rechecked the patient who is in NAD and is resting comfortably. There are no signs of worrisome arrhythmia in ER at this time. Suggested follow up with PCP. Will discharge. Pt understands and agrees with the plan. All questions answered.      MEDICAL DECISION MAKING  Results were reviewed/discussed with the patient and they were also made aware of online access. Pt also made aware that some labs, such as cultures, will not be resulted during ER visit and follow up with PMD is necessary.     MDM  Number of Diagnoses or Management Options     Amount and/or Complexity of Data Reviewed  Clinical lab tests: ordered and reviewed (INR - 2.48, WBC - 5.62, Troponin - <.010)  Tests in the radiology section of CPT®: ordered and reviewed (XR Chest shows COPD change but NAD. CT Abd/Pel shows no acute process of bowel, indeterminate liver and spleen, and no urolithiasis. There is mild bilateral perinephric fat stranding suggests chronic change.)  Decide to obtain previous medical records or to obtain history from someone other than the patient: yes  Review and summarize past medical records: yes  Independent visualization of images, tracings, or specimens: yes    Patient Progress  Patient progress: stable         DIAGNOSIS  Final diagnoses:   Lumbar strain, initial encounter   Anxiety   Palpitations       DISPOSITION  DISCHARGE    Patient discharged in stable condition.    Reviewed implications of results, diagnosis, meds, responsibility to follow up, warning signs and symptoms of possible worsening, potential complications and reasons to return to ER, including new " or worsening symptoms.    Patient/Family voiced understanding of above instructions.    Discussed plan for discharge, as there is no emergent indication for admission.  Pt/family is agreeable and understands need for follow up and repeat testing.  Pt is aware that discharge does not mean that nothing is wrong but it indicates no emergency is present that requires admission and they must continue care with follow-up as given below or physician of their choice.     FOLLOW-UP  [facility] PHYSIC REFERRAL SERVICE  [place] [id number] [phone]  Schedule an appointment as soon as possible for a visit in 3 days  EVEN IF WELL    Dr Jaylon bauman or PCP of your choice    Schedule an appointment as soon as possible for a visit in 3 days  EVEN IF WELL    Hu Bear MD  [address] [facility] [address]  [place] [id number] [phone]    Schedule an appointment as soon as possible for a visit in 3 days  EVEN IF WELL         Medication List      New Prescriptions          metaxalone 800 MG tablet   Commonly known as:  SKELAXIN   Take 1 tablet by mouth 3 (Three) Times a Day As Needed for Muscle Spasms   for up to 15 doses. Do not operate machinery         Stop          atorvastatin 80 MG tablet   Commonly known as:  LIPITOR       busPIRone 15 MG tablet   Commonly known as:  BUSPAR       clonazePAM 0.5 MG tablet   Commonly known as:  KlonoPIN       docusate sodium 100 MG capsule   Commonly known as:  COLACE       gabapentin 300 MG capsule   Commonly known as:  NEURONTIN       hydrOXYzine 50 MG tablet   Commonly known as:  ATARAX       LORazepam 0.5 MG tablet   Commonly known as:  ATIVAN       multivitamin capsule       pantoprazole 40 MG EC tablet   Commonly known as:  PROTONIX               Latest Documented Vital Signs:  As of 3:37 PM  BP- 147/98 HR- 60 Temp- 97.8 °F (36.6 °C) (Tympanic) O2 sat- 96%    --  Documentation assistance provided by franci Michael for Dr. Ken.  Information  recorded by the scribe was done at my direction and has been verified and validated by me.         Umer Michael  08/06/17 1537            8:12 PM 8/7/17  Attempted to call patient for follow-up to ensure he schedules an appointment with a primary care provider for follow-up of incidental findings on his CT scan and to ascertain how the patient is progressing.  Called home phone number provided by the patient and with no answer and unable to leave a message.  I have sent a note to care coordinators to try to follow up with the patient.           Viviana Ken MD  08/08/17 0069

## 2017-08-06 NOTE — ED NOTES
Pt. now reports pain in his chest is gone and he feels like he did a few years ago when he was having a kidney stone attack.     Hebert Streteer RN  08/06/17 3637

## 2017-08-06 NOTE — DISCHARGE INSTRUCTIONS
You are advised to follow closely with Dr Bear and Dr Raoz or Primary care provider of your choice in 2-3 days for recheck, final results of lab work and imaging testing, and further testing/treatment as needed.  Quit smoking  Drink plenty of fluids.  Heat and gentle stretching tl left lower back    Please return to the emergency department immediately with chest pain different than usual for you, shortness of air, abdominal pain, persistent vomiting/fever, blood in emesis or stool, lightheadedness/fainting, problems with speech, one sided weakness/numbness, new incontinence, problems with vision,  or for worsening of symptoms or other concerns.

## 2017-08-09 ENCOUNTER — TELEPHONE (OUTPATIENT)
Dept: SOCIAL WORK | Facility: HOSPITAL | Age: 57
End: 2017-08-09

## 2017-08-17 ENCOUNTER — TELEPHONE (OUTPATIENT)
Dept: SOCIAL WORK | Facility: HOSPITAL | Age: 57
End: 2017-08-17

## 2017-08-17 NOTE — TELEPHONE ENCOUNTER
ER F/U phone call:   Spoke with pt re:  Ct results. Pt has seen his PCP last week and has a follow up on 8-23-17. PCP made aware of CT results.Sabra King RN

## 2017-09-15 ENCOUNTER — HOSPITAL ENCOUNTER (OUTPATIENT)
Dept: HOSPITAL 23 - SMRI | Age: 57
Discharge: HOME | End: 2017-09-15
Attending: FAMILY MEDICINE
Payer: MEDICARE

## 2017-09-15 DIAGNOSIS — R93.2: ICD-10-CM

## 2017-09-15 DIAGNOSIS — R16.0: ICD-10-CM

## 2017-09-15 DIAGNOSIS — R93.5: Primary | ICD-10-CM

## 2017-09-15 DIAGNOSIS — D18.03: ICD-10-CM

## 2017-09-15 PROCEDURE — A9581 GADOXETATE DISODIUM INJ: HCPCS

## 2019-06-10 ENCOUNTER — ON CAMPUS - OUTPATIENT (OUTPATIENT)
Dept: URBAN - METROPOLITAN AREA HOSPITAL 108 | Facility: HOSPITAL | Age: 59
End: 2019-06-10

## 2019-06-10 DIAGNOSIS — I42.2 OTHER HYPERTROPHIC CARDIOMYOPATHY: ICD-10-CM

## 2019-06-10 DIAGNOSIS — F10.21 ALCOHOL DEPENDENCE, IN REMISSION: ICD-10-CM

## 2019-06-10 DIAGNOSIS — F17.200 NICOTINE DEPENDENCE, UNSPECIFIED, UNCOMPLICATED: ICD-10-CM

## 2019-06-10 DIAGNOSIS — R07.89 OTHER CHEST PAIN: ICD-10-CM

## 2019-06-10 DIAGNOSIS — I50.32 CHRONIC DIASTOLIC (CONGESTIVE) HEART FAILURE: ICD-10-CM

## 2019-06-10 DIAGNOSIS — D69.6 THROMBOCYTOPENIA, UNSPECIFIED: ICD-10-CM

## 2019-06-10 DIAGNOSIS — R11.0 NAUSEA: ICD-10-CM

## 2019-06-10 PROCEDURE — 99214 OFFICE O/P EST MOD 30 MIN: CPT

## 2019-06-26 ENCOUNTER — HOSPITAL ENCOUNTER (EMERGENCY)
Facility: HOSPITAL | Age: 59
Discharge: HOME | End: 2019-06-27
Attending: EMERGENCY MEDICINE | Admitting: EMERGENCY MEDICINE

## 2019-06-26 DIAGNOSIS — F10.10 ALCOHOL ABUSE: ICD-10-CM

## 2019-06-26 DIAGNOSIS — F10.930 ALCOHOL WITHDRAWAL SYNDROME WITHOUT COMPLICATION (HCC): Primary | ICD-10-CM

## 2019-06-26 LAB
ALBUMIN SERPL-MCNC: 4.4 G/DL (ref 3.5–5.2)
ALBUMIN/GLOB SERPL: 1.6 G/DL
ALP SERPL-CCNC: 62 U/L (ref 39–117)
ALT SERPL W P-5'-P-CCNC: 16 U/L (ref 1–41)
AMPHET+METHAMPHET UR QL: NEGATIVE
ANION GAP SERPL CALCULATED.3IONS-SCNC: 14.5 MMOL/L (ref 5–15)
AST SERPL-CCNC: 35 U/L (ref 1–40)
BARBITURATES UR QL SCN: NEGATIVE
BASOPHILS # BLD AUTO: 0.05 10*3/MM3 (ref 0–0.2)
BASOPHILS NFR BLD AUTO: 1.1 % (ref 0–1.5)
BENZODIAZ UR QL SCN: NEGATIVE
BILIRUB SERPL-MCNC: 0.4 MG/DL (ref 0.2–1.2)
BUN BLD-MCNC: 9 MG/DL (ref 6–20)
BUN/CREAT SERPL: 6.9 (ref 7–25)
CALCIUM SPEC-SCNC: 8.9 MG/DL (ref 8.6–10.5)
CANNABINOIDS SERPL QL: NEGATIVE
CHLORIDE SERPL-SCNC: 106 MMOL/L (ref 98–107)
CO2 SERPL-SCNC: 21.5 MMOL/L (ref 22–29)
COCAINE UR QL: NEGATIVE
CREAT BLD-MCNC: 1.3 MG/DL (ref 0.76–1.27)
DEPRECATED RDW RBC AUTO: 51.6 FL (ref 37–54)
EOSINOPHIL # BLD AUTO: 0.09 10*3/MM3 (ref 0–0.4)
EOSINOPHIL NFR BLD AUTO: 1.9 % (ref 0.3–6.2)
ERYTHROCYTE [DISTWIDTH] IN BLOOD BY AUTOMATED COUNT: 14.4 % (ref 12.3–15.4)
ETHANOL BLD-MCNC: 274 MG/DL (ref 0–10)
ETHANOL UR QL: 0.27 %
GFR SERPL CREATININE-BSD FRML MDRD: 57 ML/MIN/1.73
GLOBULIN UR ELPH-MCNC: 2.7 GM/DL
GLUCOSE BLD-MCNC: 106 MG/DL (ref 65–99)
HCT VFR BLD AUTO: 42.4 % (ref 37.5–51)
HGB BLD-MCNC: 13.4 G/DL (ref 13–17.7)
IMM GRANULOCYTES # BLD AUTO: 0 10*3/MM3 (ref 0–0.05)
IMM GRANULOCYTES NFR BLD AUTO: 0 % (ref 0–0.5)
INR PPP: 1.03 (ref 0.9–1.1)
LYMPHOCYTES # BLD AUTO: 1.24 10*3/MM3 (ref 0.7–3.1)
LYMPHOCYTES NFR BLD AUTO: 26.7 % (ref 19.6–45.3)
MAGNESIUM SERPL-MCNC: 2.2 MG/DL (ref 1.6–2.6)
MCH RBC QN AUTO: 30.9 PG (ref 26.6–33)
MCHC RBC AUTO-ENTMCNC: 31.6 G/DL (ref 31.5–35.7)
MCV RBC AUTO: 97.7 FL (ref 79–97)
METHADONE UR QL SCN: NEGATIVE
MONOCYTES # BLD AUTO: 0.3 10*3/MM3 (ref 0.1–0.9)
MONOCYTES NFR BLD AUTO: 6.5 % (ref 5–12)
NEUTROPHILS # BLD AUTO: 2.97 10*3/MM3 (ref 1.7–7)
NEUTROPHILS NFR BLD AUTO: 63.8 % (ref 42.7–76)
OPIATES UR QL: NEGATIVE
OXYCODONE UR QL SCN: NEGATIVE
PLATELET # BLD AUTO: 160 10*3/MM3 (ref 140–450)
PMV BLD AUTO: 9.5 FL (ref 6–12)
POTASSIUM BLD-SCNC: 3.3 MMOL/L (ref 3.5–5.2)
PROT SERPL-MCNC: 7.1 G/DL (ref 6–8.5)
PROTHROMBIN TIME: 13.2 SECONDS (ref 11.7–14.2)
RBC # BLD AUTO: 4.34 10*6/MM3 (ref 4.14–5.8)
SODIUM BLD-SCNC: 142 MMOL/L (ref 136–145)
WBC NRBC COR # BLD: 4.65 10*3/MM3 (ref 3.4–10.8)

## 2019-06-26 PROCEDURE — 80307 DRUG TEST PRSMV CHEM ANLYZR: CPT | Performed by: NURSE PRACTITIONER

## 2019-06-26 PROCEDURE — 99284 EMERGENCY DEPT VISIT MOD MDM: CPT

## 2019-06-26 PROCEDURE — 85025 COMPLETE CBC W/AUTO DIFF WBC: CPT | Performed by: NURSE PRACTITIONER

## 2019-06-26 PROCEDURE — 25010000002 LORAZEPAM PER 2 MG: Performed by: NURSE PRACTITIONER

## 2019-06-26 PROCEDURE — 83735 ASSAY OF MAGNESIUM: CPT | Performed by: NURSE PRACTITIONER

## 2019-06-26 PROCEDURE — 96376 TX/PRO/DX INJ SAME DRUG ADON: CPT

## 2019-06-26 PROCEDURE — 96366 THER/PROPH/DIAG IV INF ADDON: CPT

## 2019-06-26 PROCEDURE — 25010000002 LORAZEPAM PER 2 MG: Performed by: EMERGENCY MEDICINE

## 2019-06-26 PROCEDURE — 25010000002 MAGNESIUM SULFATE PER 500 MG OF MAGNESIUM: Performed by: NURSE PRACTITIONER

## 2019-06-26 PROCEDURE — 96365 THER/PROPH/DIAG IV INF INIT: CPT

## 2019-06-26 PROCEDURE — 85610 PROTHROMBIN TIME: CPT | Performed by: NURSE PRACTITIONER

## 2019-06-26 PROCEDURE — 80053 COMPREHEN METABOLIC PANEL: CPT | Performed by: NURSE PRACTITIONER

## 2019-06-26 PROCEDURE — 25010000002 THIAMINE PER 100 MG: Performed by: NURSE PRACTITIONER

## 2019-06-26 PROCEDURE — 96375 TX/PRO/DX INJ NEW DRUG ADDON: CPT

## 2019-06-26 RX ORDER — LORAZEPAM 2 MG/ML
2 INJECTION INTRAMUSCULAR ONCE
Status: COMPLETED | OUTPATIENT
Start: 2019-06-26 | End: 2019-06-26

## 2019-06-26 RX ORDER — LORAZEPAM 2 MG/ML
1 INJECTION INTRAMUSCULAR ONCE
Status: COMPLETED | OUTPATIENT
Start: 2019-06-26 | End: 2019-06-26

## 2019-06-26 RX ORDER — SODIUM CHLORIDE 0.9 % (FLUSH) 0.9 %
10 SYRINGE (ML) INJECTION AS NEEDED
Status: DISCONTINUED | OUTPATIENT
Start: 2019-06-26 | End: 2019-06-27 | Stop reason: HOSPADM

## 2019-06-26 RX ADMIN — LORAZEPAM 1 MG: 2 INJECTION INTRAMUSCULAR; INTRAVENOUS at 19:18

## 2019-06-26 RX ADMIN — FOLIC ACID 1000 ML/HR: 5 INJECTION, SOLUTION INTRAMUSCULAR; INTRAVENOUS; SUBCUTANEOUS at 20:07

## 2019-06-26 RX ADMIN — LORAZEPAM 2 MG: 2 INJECTION INTRAMUSCULAR; INTRAVENOUS at 21:15

## 2019-06-27 VITALS
HEART RATE: 80 BPM | RESPIRATION RATE: 16 BRPM | SYSTOLIC BLOOD PRESSURE: 122 MMHG | TEMPERATURE: 98.2 F | WEIGHT: 185 LBS | OXYGEN SATURATION: 97 % | HEIGHT: 72 IN | BODY MASS INDEX: 25.06 KG/M2 | DIASTOLIC BLOOD PRESSURE: 78 MMHG

## 2019-06-27 LAB
ETHANOL BLD-MCNC: 59 MG/DL (ref 0–10)
ETHANOL UR QL: 0.06 %

## 2019-06-27 PROCEDURE — 80307 DRUG TEST PRSMV CHEM ANLYZR: CPT | Performed by: NURSE PRACTITIONER

## 2019-06-27 PROCEDURE — 25010000002 ONDANSETRON PER 1 MG: Performed by: PHYSICIAN ASSISTANT

## 2019-06-27 PROCEDURE — 96375 TX/PRO/DX INJ NEW DRUG ADDON: CPT

## 2019-06-27 RX ORDER — ONDANSETRON 2 MG/ML
4 INJECTION INTRAMUSCULAR; INTRAVENOUS ONCE
Status: DISCONTINUED | OUTPATIENT
Start: 2019-06-27 | End: 2019-06-27

## 2019-06-27 RX ORDER — ONDANSETRON 2 MG/ML
4 INJECTION INTRAMUSCULAR; INTRAVENOUS ONCE
Status: COMPLETED | OUTPATIENT
Start: 2019-06-27 | End: 2019-06-27

## 2019-06-27 RX ORDER — ONDANSETRON 4 MG/1
4 TABLET, ORALLY DISINTEGRATING ORAL EVERY 8 HOURS PRN
Qty: 15 TABLET | Refills: 0 | Status: SHIPPED | OUTPATIENT
Start: 2019-06-27

## 2019-06-27 RX ORDER — CHLORDIAZEPOXIDE HYDROCHLORIDE 25 MG/1
CAPSULE, GELATIN COATED ORAL
Qty: 18 CAPSULE | Refills: 0 | Status: SHIPPED | OUTPATIENT
Start: 2019-06-27

## 2019-06-27 RX ADMIN — ONDANSETRON HYDROCHLORIDE 4 MG: 2 SOLUTION INTRAMUSCULAR; INTRAVENOUS at 03:02

## 2019-07-09 ENCOUNTER — OFFICE (OUTPATIENT)
Dept: URBAN - METROPOLITAN AREA CLINIC 75 | Facility: CLINIC | Age: 59
End: 2019-07-09

## 2019-07-09 VITALS
HEIGHT: 72 IN | DIASTOLIC BLOOD PRESSURE: 83 MMHG | HEART RATE: 60 BPM | WEIGHT: 191 LBS | SYSTOLIC BLOOD PRESSURE: 154 MMHG

## 2019-07-09 DIAGNOSIS — F10.10 ALCOHOL ABUSE, UNCOMPLICATED: ICD-10-CM

## 2019-07-09 DIAGNOSIS — Z79.01 LONG TERM (CURRENT) USE OF ANTICOAGULANTS: ICD-10-CM

## 2019-07-09 DIAGNOSIS — Z12.11 ENCOUNTER FOR SCREENING FOR MALIGNANT NEOPLASM OF COLON: ICD-10-CM

## 2019-07-09 DIAGNOSIS — R07.89 OTHER CHEST PAIN: ICD-10-CM

## 2019-07-09 DIAGNOSIS — R11.0 NAUSEA: ICD-10-CM

## 2019-07-09 PROCEDURE — 99214 OFFICE O/P EST MOD 30 MIN: CPT | Performed by: NURSE PRACTITIONER

## 2019-07-23 ENCOUNTER — ON CAMPUS - OUTPATIENT (OUTPATIENT)
Dept: URBAN - METROPOLITAN AREA HOSPITAL 108 | Facility: HOSPITAL | Age: 59
End: 2019-07-23

## 2019-07-23 DIAGNOSIS — K21.0 GASTRO-ESOPHAGEAL REFLUX DISEASE WITH ESOPHAGITIS: ICD-10-CM

## 2019-07-23 DIAGNOSIS — K31.89 OTHER DISEASES OF STOMACH AND DUODENUM: ICD-10-CM

## 2019-07-23 DIAGNOSIS — R07.89 OTHER CHEST PAIN: ICD-10-CM

## 2019-07-23 PROCEDURE — 43239 EGD BIOPSY SINGLE/MULTIPLE: CPT | Performed by: INTERNAL MEDICINE

## 2023-02-13 ENCOUNTER — INPATIENT HOSPITAL (OUTPATIENT)
Dept: URBAN - METROPOLITAN AREA HOSPITAL 107 | Facility: HOSPITAL | Age: 63
End: 2023-02-13

## 2023-02-13 DIAGNOSIS — R77.8 OTHER SPECIFIED ABNORMALITIES OF PLASMA PROTEINS: ICD-10-CM

## 2023-02-13 DIAGNOSIS — J18.8 OTHER PNEUMONIA, UNSPECIFIED ORGANISM: ICD-10-CM

## 2023-02-13 DIAGNOSIS — Z95.1 PRESENCE OF AORTOCORONARY BYPASS GRAFT: ICD-10-CM

## 2023-02-13 DIAGNOSIS — D64.9 ANEMIA, UNSPECIFIED: ICD-10-CM

## 2023-02-13 DIAGNOSIS — I48.91 UNSPECIFIED ATRIAL FIBRILLATION: ICD-10-CM

## 2023-02-13 DIAGNOSIS — I25.709 ATHEROSCLEROSIS OF CORONARY ARTERY BYPASS GRAFT(S), UNSPECIF: ICD-10-CM

## 2023-02-13 DIAGNOSIS — R11.0 NAUSEA: ICD-10-CM

## 2023-02-13 DIAGNOSIS — R63.4 ABNORMAL WEIGHT LOSS: ICD-10-CM

## 2023-02-13 PROCEDURE — 99222 1ST HOSP IP/OBS MODERATE 55: CPT | Mod: FS | Performed by: PHYSICIAN ASSISTANT

## 2023-02-14 ENCOUNTER — INPATIENT HOSPITAL (OUTPATIENT)
Dept: URBAN - METROPOLITAN AREA HOSPITAL 107 | Facility: HOSPITAL | Age: 63
End: 2023-02-14

## 2023-02-14 DIAGNOSIS — R77.8 OTHER SPECIFIED ABNORMALITIES OF PLASMA PROTEINS: ICD-10-CM

## 2023-02-14 DIAGNOSIS — K31.89 OTHER DISEASES OF STOMACH AND DUODENUM: ICD-10-CM

## 2023-02-14 DIAGNOSIS — R11.0 NAUSEA: ICD-10-CM

## 2023-02-14 DIAGNOSIS — R63.0 ANOREXIA: ICD-10-CM

## 2023-02-14 DIAGNOSIS — J18.8 OTHER PNEUMONIA, UNSPECIFIED ORGANISM: ICD-10-CM

## 2023-02-14 DIAGNOSIS — R63.4 ABNORMAL WEIGHT LOSS: ICD-10-CM

## 2023-02-14 DIAGNOSIS — D50.9 IRON DEFICIENCY ANEMIA, UNSPECIFIED: ICD-10-CM

## 2023-02-14 PROCEDURE — 99232 SBSQ HOSP IP/OBS MODERATE 35: CPT | Performed by: PHYSICIAN ASSISTANT

## 2023-02-15 ENCOUNTER — INPATIENT HOSPITAL (OUTPATIENT)
Dept: URBAN - METROPOLITAN AREA HOSPITAL 107 | Facility: HOSPITAL | Age: 63
End: 2023-02-15

## 2023-02-15 DIAGNOSIS — K31.89 OTHER DISEASES OF STOMACH AND DUODENUM: ICD-10-CM

## 2023-02-15 DIAGNOSIS — R11.0 NAUSEA: ICD-10-CM

## 2023-02-15 DIAGNOSIS — D50.9 IRON DEFICIENCY ANEMIA, UNSPECIFIED: ICD-10-CM

## 2023-02-15 DIAGNOSIS — R63.0 ANOREXIA: ICD-10-CM

## 2023-02-15 DIAGNOSIS — R77.8 OTHER SPECIFIED ABNORMALITIES OF PLASMA PROTEINS: ICD-10-CM

## 2023-02-15 DIAGNOSIS — R63.4 ABNORMAL WEIGHT LOSS: ICD-10-CM

## 2023-02-15 PROCEDURE — 99232 SBSQ HOSP IP/OBS MODERATE 35: CPT | Performed by: PHYSICIAN ASSISTANT

## 2023-02-16 ENCOUNTER — INPATIENT HOSPITAL (OUTPATIENT)
Dept: URBAN - METROPOLITAN AREA HOSPITAL 107 | Facility: HOSPITAL | Age: 63
End: 2023-02-16

## 2023-02-16 DIAGNOSIS — D12.3 BENIGN NEOPLASM OF TRANSVERSE COLON: ICD-10-CM

## 2023-02-16 DIAGNOSIS — K31.89 OTHER DISEASES OF STOMACH AND DUODENUM: ICD-10-CM

## 2023-02-16 DIAGNOSIS — R63.4 ABNORMAL WEIGHT LOSS: ICD-10-CM

## 2023-02-16 DIAGNOSIS — K20.80 OTHER ESOPHAGITIS WITHOUT BLEEDING: ICD-10-CM

## 2023-02-16 DIAGNOSIS — R63.0 ANOREXIA: ICD-10-CM

## 2023-02-16 DIAGNOSIS — D50.9 IRON DEFICIENCY ANEMIA, UNSPECIFIED: ICD-10-CM

## 2023-02-16 DIAGNOSIS — K64.8 OTHER HEMORRHOIDS: ICD-10-CM

## 2023-02-16 PROCEDURE — 43239 EGD BIOPSY SINGLE/MULTIPLE: CPT | Performed by: INTERNAL MEDICINE

## 2023-02-16 PROCEDURE — 45385 COLONOSCOPY W/LESION REMOVAL: CPT | Performed by: INTERNAL MEDICINE

## 2023-02-17 ENCOUNTER — INPATIENT HOSPITAL (OUTPATIENT)
Dept: URBAN - METROPOLITAN AREA HOSPITAL 107 | Facility: HOSPITAL | Age: 63
End: 2023-02-17

## 2023-02-17 DIAGNOSIS — R77.8 OTHER SPECIFIED ABNORMALITIES OF PLASMA PROTEINS: ICD-10-CM

## 2023-02-17 DIAGNOSIS — R11.0 NAUSEA: ICD-10-CM

## 2023-02-17 DIAGNOSIS — D64.9 ANEMIA, UNSPECIFIED: ICD-10-CM

## 2023-02-17 DIAGNOSIS — R63.4 ABNORMAL WEIGHT LOSS: ICD-10-CM

## 2023-02-17 PROCEDURE — 99232 SBSQ HOSP IP/OBS MODERATE 35: CPT | Performed by: PHYSICIAN ASSISTANT

## 2023-02-18 ENCOUNTER — INPATIENT HOSPITAL (OUTPATIENT)
Dept: URBAN - METROPOLITAN AREA HOSPITAL 107 | Facility: HOSPITAL | Age: 63
End: 2023-02-18

## 2023-02-18 DIAGNOSIS — R11.0 NAUSEA: ICD-10-CM

## 2023-02-18 DIAGNOSIS — R63.4 ABNORMAL WEIGHT LOSS: ICD-10-CM

## 2023-02-18 DIAGNOSIS — R77.8 OTHER SPECIFIED ABNORMALITIES OF PLASMA PROTEINS: ICD-10-CM

## 2023-02-18 DIAGNOSIS — D50.9 IRON DEFICIENCY ANEMIA, UNSPECIFIED: ICD-10-CM

## 2023-02-18 DIAGNOSIS — R63.0 ANOREXIA: ICD-10-CM

## 2023-02-18 DIAGNOSIS — K31.89 OTHER DISEASES OF STOMACH AND DUODENUM: ICD-10-CM

## 2023-02-18 PROCEDURE — 99232 SBSQ HOSP IP/OBS MODERATE 35: CPT | Performed by: INTERNAL MEDICINE

## 2024-08-10 ENCOUNTER — HOSPITAL ENCOUNTER (INPATIENT)
Facility: HOSPITAL | Age: 64
LOS: 8 days | Discharge: HOME | DRG: 682 | End: 2024-08-19
Attending: EMERGENCY MEDICINE | Admitting: HOSPITALIST
Payer: MEDICARE

## 2024-08-10 ENCOUNTER — APPOINTMENT (OUTPATIENT)
Dept: GENERAL RADIOLOGY | Facility: HOSPITAL | Age: 64
DRG: 682 | End: 2024-08-10
Payer: MEDICARE

## 2024-08-10 DIAGNOSIS — R79.89 ELEVATED TROPONIN: ICD-10-CM

## 2024-08-10 DIAGNOSIS — E87.6 HYPOKALEMIA: Primary | ICD-10-CM

## 2024-08-10 DIAGNOSIS — R60.0 LOWER EXTREMITY EDEMA: ICD-10-CM

## 2024-08-10 DIAGNOSIS — D64.9 ANEMIA, UNSPECIFIED TYPE: ICD-10-CM

## 2024-08-10 DIAGNOSIS — N17.9 AKI (ACUTE KIDNEY INJURY): ICD-10-CM

## 2024-08-10 LAB
ALBUMIN SERPL-MCNC: 4.1 G/DL (ref 3.5–5.2)
ALBUMIN/GLOB SERPL: 1.6 G/DL
ALP SERPL-CCNC: 219 U/L (ref 39–117)
ALT SERPL W P-5'-P-CCNC: 37 U/L (ref 1–41)
AMPHET+METHAMPHET UR QL: NEGATIVE
ANION GAP SERPL CALCULATED.3IONS-SCNC: 15 MMOL/L (ref 5–15)
ANION GAP SERPL CALCULATED.3IONS-SCNC: 16 MMOL/L (ref 5–15)
APTT PPP: 46.6 SECONDS (ref 22.7–35.4)
AST SERPL-CCNC: 77 U/L (ref 1–40)
B PARAPERT DNA SPEC QL NAA+PROBE: NOT DETECTED
B PERT DNA SPEC QL NAA+PROBE: NOT DETECTED
BACTERIA UR QL AUTO: ABNORMAL /HPF
BARBITURATES UR QL SCN: NEGATIVE
BASOPHILS # BLD AUTO: 0.04 10*3/MM3 (ref 0–0.2)
BASOPHILS NFR BLD AUTO: 0.5 % (ref 0–1.5)
BENZODIAZ UR QL SCN: NEGATIVE
BILIRUB SERPL-MCNC: 2.6 MG/DL (ref 0–1.2)
BILIRUB UR QL STRIP: NEGATIVE
BUN SERPL-MCNC: 21 MG/DL (ref 8–23)
BUN SERPL-MCNC: 26 MG/DL (ref 8–23)
BUN/CREAT SERPL: 13.2 (ref 7–25)
BUN/CREAT SERPL: 14.9 (ref 7–25)
C PNEUM DNA NPH QL NAA+NON-PROBE: NOT DETECTED
CALCIUM SPEC-SCNC: 8 MG/DL (ref 8.6–10.5)
CALCIUM SPEC-SCNC: 8.6 MG/DL (ref 8.6–10.5)
CANNABINOIDS SERPL QL: POSITIVE
CHLORIDE SERPL-SCNC: 89 MMOL/L (ref 98–107)
CHLORIDE SERPL-SCNC: 91 MMOL/L (ref 98–107)
CLARITY UR: CLEAR
CO2 SERPL-SCNC: 27 MMOL/L (ref 22–29)
CO2 SERPL-SCNC: 30 MMOL/L (ref 22–29)
COCAINE UR QL: NEGATIVE
COLOR UR: ABNORMAL
CREAT SERPL-MCNC: 1.59 MG/DL (ref 0.76–1.27)
CREAT SERPL-MCNC: 1.74 MG/DL (ref 0.76–1.27)
DEPRECATED RDW RBC AUTO: 61.1 FL (ref 37–54)
EGFRCR SERPLBLD CKD-EPI 2021: 43.5 ML/MIN/1.73
EGFRCR SERPLBLD CKD-EPI 2021: 48.5 ML/MIN/1.73
EOSINOPHIL # BLD AUTO: 0.04 10*3/MM3 (ref 0–0.4)
EOSINOPHIL NFR BLD AUTO: 0.5 % (ref 0.3–6.2)
ERYTHROCYTE [DISTWIDTH] IN BLOOD BY AUTOMATED COUNT: 19.8 % (ref 12.3–15.4)
ETHANOL BLD-MCNC: <10 MG/DL (ref 0–10)
ETHANOL UR QL: <0.01 %
FENTANYL UR-MCNC: NEGATIVE NG/ML
FLUAV SUBTYP SPEC NAA+PROBE: NOT DETECTED
FLUBV RNA ISLT QL NAA+PROBE: NOT DETECTED
GEN 5 2HR TROPONIN T REFLEX: 40 NG/L
GLOBULIN UR ELPH-MCNC: 2.6 GM/DL
GLUCOSE SERPL-MCNC: 132 MG/DL (ref 65–99)
GLUCOSE SERPL-MCNC: 141 MG/DL (ref 65–99)
GLUCOSE UR STRIP-MCNC: NEGATIVE MG/DL
HADV DNA SPEC NAA+PROBE: NOT DETECTED
HCOV 229E RNA SPEC QL NAA+PROBE: NOT DETECTED
HCOV HKU1 RNA SPEC QL NAA+PROBE: NOT DETECTED
HCOV NL63 RNA SPEC QL NAA+PROBE: NOT DETECTED
HCOV OC43 RNA SPEC QL NAA+PROBE: NOT DETECTED
HCT VFR BLD AUTO: 30.2 % (ref 37.5–51)
HGB BLD-MCNC: 9.6 G/DL (ref 13–17.7)
HGB UR QL STRIP.AUTO: NEGATIVE
HMPV RNA NPH QL NAA+NON-PROBE: NOT DETECTED
HPIV1 RNA ISLT QL NAA+PROBE: NOT DETECTED
HPIV2 RNA SPEC QL NAA+PROBE: NOT DETECTED
HPIV3 RNA NPH QL NAA+PROBE: NOT DETECTED
HPIV4 P GENE NPH QL NAA+PROBE: NOT DETECTED
HYALINE CASTS UR QL AUTO: ABNORMAL /LPF
IMM GRANULOCYTES # BLD AUTO: 0.03 10*3/MM3 (ref 0–0.05)
IMM GRANULOCYTES NFR BLD AUTO: 0.3 % (ref 0–0.5)
INR PPP: 3.3 (ref 0.9–1.1)
KETONES UR QL STRIP: NEGATIVE
LEUKOCYTE ESTERASE UR QL STRIP.AUTO: ABNORMAL
LYMPHOCYTES # BLD AUTO: 0.82 10*3/MM3 (ref 0.7–3.1)
LYMPHOCYTES NFR BLD AUTO: 9.4 % (ref 19.6–45.3)
M PNEUMO IGG SER IA-ACNC: NOT DETECTED
MAGNESIUM SERPL-MCNC: 2 MG/DL (ref 1.6–2.4)
MCH RBC QN AUTO: 27 PG (ref 26.6–33)
MCHC RBC AUTO-ENTMCNC: 31.8 G/DL (ref 31.5–35.7)
MCV RBC AUTO: 84.8 FL (ref 79–97)
METHADONE UR QL SCN: NEGATIVE
MONOCYTES # BLD AUTO: 0.86 10*3/MM3 (ref 0.1–0.9)
MONOCYTES NFR BLD AUTO: 9.9 % (ref 5–12)
NEUTROPHILS NFR BLD AUTO: 6.91 10*3/MM3 (ref 1.7–7)
NEUTROPHILS NFR BLD AUTO: 79.4 % (ref 42.7–76)
NITRITE UR QL STRIP: NEGATIVE
NRBC BLD AUTO-RTO: 0 /100 WBC (ref 0–0.2)
NT-PROBNP SERPL-MCNC: ABNORMAL PG/ML (ref 0–900)
OPIATES UR QL: NEGATIVE
OXYCODONE UR QL SCN: NEGATIVE
PH UR STRIP.AUTO: 6.5 [PH] (ref 5–8)
PHOSPHATE SERPL-MCNC: 3 MG/DL (ref 2.5–4.5)
PLATELET # BLD AUTO: 222 10*3/MM3 (ref 140–450)
PMV BLD AUTO: 8.5 FL (ref 6–12)
POTASSIUM SERPL-SCNC: 1.8 MMOL/L (ref 3.5–5.2)
POTASSIUM SERPL-SCNC: 2.2 MMOL/L (ref 3.5–5.2)
PROT SERPL-MCNC: 6.7 G/DL (ref 6–8.5)
PROT UR QL STRIP: ABNORMAL
PROTHROMBIN TIME: 33.8 SECONDS (ref 11.7–14.2)
QT INTERVAL: 589 MS
QTC INTERVAL: 631 MS
RBC # BLD AUTO: 3.56 10*6/MM3 (ref 4.14–5.8)
RBC # UR STRIP: ABNORMAL /HPF
REF LAB TEST METHOD: ABNORMAL
RHINOVIRUS RNA SPEC NAA+PROBE: NOT DETECTED
RSV RNA NPH QL NAA+NON-PROBE: NOT DETECTED
SARS-COV-2 RNA NPH QL NAA+NON-PROBE: NOT DETECTED
SODIUM SERPL-SCNC: 134 MMOL/L (ref 136–145)
SODIUM SERPL-SCNC: 134 MMOL/L (ref 136–145)
SP GR UR STRIP: 1.01 (ref 1–1.03)
SQUAMOUS #/AREA URNS HPF: ABNORMAL /HPF
TROPONIN T DELTA: -10 NG/L
TROPONIN T SERPL HS-MCNC: 50 NG/L
UROBILINOGEN UR QL STRIP: ABNORMAL
WBC # UR STRIP: ABNORMAL /HPF
WBC NRBC COR # BLD AUTO: 8.7 10*3/MM3 (ref 3.4–10.8)

## 2024-08-10 PROCEDURE — 81001 URINALYSIS AUTO W/SCOPE: CPT | Performed by: HOSPITALIST

## 2024-08-10 PROCEDURE — 94761 N-INVAS EAR/PLS OXIMETRY MLT: CPT

## 2024-08-10 PROCEDURE — 83880 ASSAY OF NATRIURETIC PEPTIDE: CPT | Performed by: EMERGENCY MEDICINE

## 2024-08-10 PROCEDURE — 25010000002 THIAMINE HCL 200 MG/2ML SOLUTION 2 ML VIAL: Performed by: HOSPITALIST

## 2024-08-10 PROCEDURE — 80307 DRUG TEST PRSMV CHEM ANLYZR: CPT | Performed by: HOSPITALIST

## 2024-08-10 PROCEDURE — G0378 HOSPITAL OBSERVATION PER HR: HCPCS

## 2024-08-10 PROCEDURE — 93010 ELECTROCARDIOGRAM REPORT: CPT | Performed by: INTERNAL MEDICINE

## 2024-08-10 PROCEDURE — 25010000002 MAGNESIUM SULFATE 2 GM/50ML SOLUTION: Performed by: EMERGENCY MEDICINE

## 2024-08-10 PROCEDURE — 63710000001 ONDANSETRON ODT 4 MG TABLET DISPERSIBLE: Performed by: HOSPITALIST

## 2024-08-10 PROCEDURE — 84100 ASSAY OF PHOSPHORUS: CPT | Performed by: HOSPITALIST

## 2024-08-10 PROCEDURE — 85025 COMPLETE CBC W/AUTO DIFF WBC: CPT | Performed by: EMERGENCY MEDICINE

## 2024-08-10 PROCEDURE — 71045 X-RAY EXAM CHEST 1 VIEW: CPT

## 2024-08-10 PROCEDURE — 94640 AIRWAY INHALATION TREATMENT: CPT

## 2024-08-10 PROCEDURE — 99285 EMERGENCY DEPT VISIT HI MDM: CPT

## 2024-08-10 PROCEDURE — 84484 ASSAY OF TROPONIN QUANT: CPT | Performed by: HOSPITALIST

## 2024-08-10 PROCEDURE — 36415 COLL VENOUS BLD VENIPUNCTURE: CPT | Performed by: HOSPITALIST

## 2024-08-10 PROCEDURE — 94799 UNLISTED PULMONARY SVC/PX: CPT

## 2024-08-10 PROCEDURE — 94664 DEMO&/EVAL PT USE INHALER: CPT

## 2024-08-10 PROCEDURE — 0202U NFCT DS 22 TRGT SARS-COV-2: CPT | Performed by: EMERGENCY MEDICINE

## 2024-08-10 PROCEDURE — 82077 ASSAY SPEC XCP UR&BREATH IA: CPT | Performed by: HOSPITALIST

## 2024-08-10 PROCEDURE — 25010000002 POTASSIUM CHLORIDE 10 MEQ/100ML SOLUTION: Performed by: EMERGENCY MEDICINE

## 2024-08-10 PROCEDURE — 80053 COMPREHEN METABOLIC PANEL: CPT | Performed by: EMERGENCY MEDICINE

## 2024-08-10 PROCEDURE — 85730 THROMBOPLASTIN TIME PARTIAL: CPT | Performed by: EMERGENCY MEDICINE

## 2024-08-10 PROCEDURE — 84484 ASSAY OF TROPONIN QUANT: CPT | Performed by: EMERGENCY MEDICINE

## 2024-08-10 PROCEDURE — 25010000002 POTASSIUM CHLORIDE 10 MEQ/100ML SOLUTION: Performed by: HOSPITALIST

## 2024-08-10 PROCEDURE — 85610 PROTHROMBIN TIME: CPT | Performed by: EMERGENCY MEDICINE

## 2024-08-10 PROCEDURE — 83735 ASSAY OF MAGNESIUM: CPT | Performed by: EMERGENCY MEDICINE

## 2024-08-10 PROCEDURE — 99291 CRITICAL CARE FIRST HOUR: CPT

## 2024-08-10 PROCEDURE — 93005 ELECTROCARDIOGRAM TRACING: CPT | Performed by: EMERGENCY MEDICINE

## 2024-08-10 RX ORDER — ARFORMOTEROL TARTRATE 15 UG/2ML
15 SOLUTION RESPIRATORY (INHALATION)
Status: DISCONTINUED | OUTPATIENT
Start: 2024-08-10 | End: 2024-08-17

## 2024-08-10 RX ORDER — POTASSIUM CHLORIDE 7.45 MG/ML
10 INJECTION INTRAVENOUS ONCE
Status: COMPLETED | OUTPATIENT
Start: 2024-08-10 | End: 2024-08-10

## 2024-08-10 RX ORDER — ALBUTEROL SULFATE 0.83 MG/ML
2.5 SOLUTION RESPIRATORY (INHALATION) EVERY 6 HOURS PRN
Status: DISCONTINUED | OUTPATIENT
Start: 2024-08-10 | End: 2024-08-19 | Stop reason: HOSPADM

## 2024-08-10 RX ORDER — AMIODARONE HYDROCHLORIDE 200 MG/1
200 TABLET ORAL DAILY
Status: DISCONTINUED | OUTPATIENT
Start: 2024-08-10 | End: 2024-08-14

## 2024-08-10 RX ORDER — POLYETHYLENE GLYCOL 3350 17 G/17G
17 POWDER, FOR SOLUTION ORAL DAILY PRN
Status: DISCONTINUED | OUTPATIENT
Start: 2024-08-10 | End: 2024-08-19 | Stop reason: HOSPADM

## 2024-08-10 RX ORDER — NITROGLYCERIN 0.4 MG/1
0.4 TABLET SUBLINGUAL
Status: DISCONTINUED | OUTPATIENT
Start: 2024-08-10 | End: 2024-08-19 | Stop reason: HOSPADM

## 2024-08-10 RX ORDER — FUROSEMIDE 20 MG
40 TABLET ORAL 2 TIMES DAILY
COMMUNITY

## 2024-08-10 RX ORDER — HYDROXYZINE HYDROCHLORIDE 25 MG/1
25 TABLET, FILM COATED ORAL ONCE AS NEEDED
Status: COMPLETED | OUTPATIENT
Start: 2024-08-10 | End: 2024-08-10

## 2024-08-10 RX ORDER — BUSPIRONE HYDROCHLORIDE 15 MG/1
15 TABLET ORAL 2 TIMES DAILY PRN
Status: DISCONTINUED | OUTPATIENT
Start: 2024-08-10 | End: 2024-08-19 | Stop reason: HOSPADM

## 2024-08-10 RX ORDER — POTASSIUM CHLORIDE 750 MG/1
20 TABLET, EXTENDED RELEASE ORAL 2 TIMES DAILY
COMMUNITY

## 2024-08-10 RX ORDER — SODIUM CHLORIDE 0.9 % (FLUSH) 0.9 %
10 SYRINGE (ML) INJECTION AS NEEDED
Status: DISCONTINUED | OUTPATIENT
Start: 2024-08-10 | End: 2024-08-19 | Stop reason: HOSPADM

## 2024-08-10 RX ORDER — SERTRALINE HYDROCHLORIDE 100 MG/1
100 TABLET, FILM COATED ORAL NIGHTLY
Status: DISCONTINUED | OUTPATIENT
Start: 2024-08-10 | End: 2024-08-19 | Stop reason: HOSPADM

## 2024-08-10 RX ORDER — BISACODYL 5 MG/1
5 TABLET, DELAYED RELEASE ORAL DAILY PRN
Status: DISCONTINUED | OUTPATIENT
Start: 2024-08-10 | End: 2024-08-19 | Stop reason: HOSPADM

## 2024-08-10 RX ORDER — ACETAMINOPHEN 650 MG/1
650 SUPPOSITORY RECTAL EVERY 4 HOURS PRN
Status: DISCONTINUED | OUTPATIENT
Start: 2024-08-10 | End: 2024-08-19 | Stop reason: HOSPADM

## 2024-08-10 RX ORDER — SPIRONOLACTONE 25 MG/1
25 TABLET ORAL
Status: DISCONTINUED | OUTPATIENT
Start: 2024-08-10 | End: 2024-08-13

## 2024-08-10 RX ORDER — SODIUM CHLORIDE 9 MG/ML
40 INJECTION, SOLUTION INTRAVENOUS AS NEEDED
Status: DISCONTINUED | OUTPATIENT
Start: 2024-08-10 | End: 2024-08-19 | Stop reason: HOSPADM

## 2024-08-10 RX ORDER — LOSARTAN POTASSIUM 25 MG/1
25 TABLET ORAL DAILY
COMMUNITY
End: 2024-08-19 | Stop reason: HOSPADM

## 2024-08-10 RX ORDER — AMOXICILLIN 250 MG
2 CAPSULE ORAL 2 TIMES DAILY PRN
Status: DISCONTINUED | OUTPATIENT
Start: 2024-08-10 | End: 2024-08-19 | Stop reason: HOSPADM

## 2024-08-10 RX ORDER — ALBUTEROL SULFATE 90 UG/1
2 AEROSOL, METERED RESPIRATORY (INHALATION) EVERY 4 HOURS PRN
COMMUNITY

## 2024-08-10 RX ORDER — ONDANSETRON 4 MG/1
4 TABLET, ORALLY DISINTEGRATING ORAL EVERY 6 HOURS PRN
Status: DISCONTINUED | OUTPATIENT
Start: 2024-08-10 | End: 2024-08-19 | Stop reason: HOSPADM

## 2024-08-10 RX ORDER — ATORVASTATIN CALCIUM 80 MG/1
80 TABLET, FILM COATED ORAL NIGHTLY
COMMUNITY

## 2024-08-10 RX ORDER — POTASSIUM CHLORIDE 750 MG/1
40 TABLET, FILM COATED, EXTENDED RELEASE ORAL ONCE
Status: COMPLETED | OUTPATIENT
Start: 2024-08-10 | End: 2024-08-10

## 2024-08-10 RX ORDER — AMIODARONE HYDROCHLORIDE 200 MG/1
200 TABLET ORAL DAILY
COMMUNITY
End: 2024-08-19 | Stop reason: HOSPADM

## 2024-08-10 RX ORDER — METOPROLOL TARTRATE 25 MG/1
25 TABLET, FILM COATED ORAL 2 TIMES DAILY
Status: DISCONTINUED | OUTPATIENT
Start: 2024-08-10 | End: 2024-08-11

## 2024-08-10 RX ORDER — SPIRONOLACTONE 25 MG/1
25 TABLET ORAL DAILY
COMMUNITY

## 2024-08-10 RX ORDER — ACETAMINOPHEN 160 MG/5ML
650 SOLUTION ORAL EVERY 4 HOURS PRN
Status: DISCONTINUED | OUTPATIENT
Start: 2024-08-10 | End: 2024-08-19 | Stop reason: HOSPADM

## 2024-08-10 RX ORDER — ACETAMINOPHEN 325 MG/1
650 TABLET ORAL EVERY 4 HOURS PRN
Status: DISCONTINUED | OUTPATIENT
Start: 2024-08-10 | End: 2024-08-19 | Stop reason: HOSPADM

## 2024-08-10 RX ORDER — HYDROXYZINE HYDROCHLORIDE 25 MG/1
25 TABLET, FILM COATED ORAL ONCE
Status: COMPLETED | OUTPATIENT
Start: 2024-08-10 | End: 2024-08-10

## 2024-08-10 RX ORDER — ATORVASTATIN CALCIUM 80 MG/1
80 TABLET, FILM COATED ORAL NIGHTLY
Status: DISCONTINUED | OUTPATIENT
Start: 2024-08-10 | End: 2024-08-14

## 2024-08-10 RX ORDER — SODIUM CHLORIDE 0.9 % (FLUSH) 0.9 %
10 SYRINGE (ML) INJECTION EVERY 12 HOURS SCHEDULED
Status: DISCONTINUED | OUTPATIENT
Start: 2024-08-10 | End: 2024-08-19 | Stop reason: HOSPADM

## 2024-08-10 RX ORDER — METOPROLOL TARTRATE 25 MG/1
25 TABLET, FILM COATED ORAL 2 TIMES DAILY
COMMUNITY
End: 2024-08-19 | Stop reason: HOSPADM

## 2024-08-10 RX ORDER — SERTRALINE HYDROCHLORIDE 100 MG/1
100 TABLET, FILM COATED ORAL NIGHTLY
COMMUNITY

## 2024-08-10 RX ORDER — MAGNESIUM SULFATE HEPTAHYDRATE 40 MG/ML
2 INJECTION, SOLUTION INTRAVENOUS ONCE
Status: COMPLETED | OUTPATIENT
Start: 2024-08-10 | End: 2024-08-10

## 2024-08-10 RX ORDER — ONDANSETRON 2 MG/ML
4 INJECTION INTRAMUSCULAR; INTRAVENOUS EVERY 6 HOURS PRN
Status: DISCONTINUED | OUTPATIENT
Start: 2024-08-10 | End: 2024-08-14

## 2024-08-10 RX ORDER — UMECLIDINIUM BROMIDE AND VILANTEROL TRIFENATATE 62.5; 25 UG/1; UG/1
1 POWDER RESPIRATORY (INHALATION)
COMMUNITY

## 2024-08-10 RX ORDER — BISACODYL 10 MG
10 SUPPOSITORY, RECTAL RECTAL DAILY PRN
Status: DISCONTINUED | OUTPATIENT
Start: 2024-08-10 | End: 2024-08-19 | Stop reason: HOSPADM

## 2024-08-10 RX ORDER — BUSPIRONE HYDROCHLORIDE 15 MG/1
15 TABLET ORAL 2 TIMES DAILY PRN
COMMUNITY

## 2024-08-10 RX ADMIN — SERTRALINE 100 MG: 100 TABLET, FILM COATED ORAL at 21:30

## 2024-08-10 RX ADMIN — RIVAROXABAN 15 MG: 15 TABLET, FILM COATED ORAL at 18:32

## 2024-08-10 RX ADMIN — ACETAMINOPHEN 325MG 650 MG: 325 TABLET ORAL at 23:57

## 2024-08-10 RX ADMIN — Medication 10 ML: at 21:31

## 2024-08-10 RX ADMIN — METOPROLOL TARTRATE 25 MG: 25 TABLET, FILM COATED ORAL at 21:30

## 2024-08-10 RX ADMIN — POTASSIUM CHLORIDE 10 MEQ: 7.46 INJECTION, SOLUTION INTRAVENOUS at 16:58

## 2024-08-10 RX ADMIN — AMIODARONE HYDROCHLORIDE 200 MG: 200 TABLET ORAL at 16:47

## 2024-08-10 RX ADMIN — ARFORMOTEROL TARTRATE 15 MCG: 15 SOLUTION RESPIRATORY (INHALATION) at 20:43

## 2024-08-10 RX ADMIN — HYDROXYZINE HYDROCHLORIDE 25 MG: 25 TABLET ORAL at 12:38

## 2024-08-10 RX ADMIN — ONDANSETRON 4 MG: 4 TABLET, ORALLY DISINTEGRATING ORAL at 16:47

## 2024-08-10 RX ADMIN — TIOTROPIUM BROMIDE INHALATION SPRAY 2 PUFF: 3.12 SPRAY, METERED RESPIRATORY (INHALATION) at 20:58

## 2024-08-10 RX ADMIN — POTASSIUM CHLORIDE 10 MEQ: 7.46 INJECTION, SOLUTION INTRAVENOUS at 15:04

## 2024-08-10 RX ADMIN — POTASSIUM CHLORIDE 10 MEQ: 7.46 INJECTION, SOLUTION INTRAVENOUS at 12:42

## 2024-08-10 RX ADMIN — ATORVASTATIN CALCIUM 80 MG: 80 TABLET, FILM COATED ORAL at 21:30

## 2024-08-10 RX ADMIN — HYDROXYZINE HYDROCHLORIDE 25 MG: 25 TABLET ORAL at 15:35

## 2024-08-10 RX ADMIN — POTASSIUM CHLORIDE 40 MEQ: 750 TABLET, EXTENDED RELEASE ORAL at 12:38

## 2024-08-10 RX ADMIN — THIAMINE HYDROCHLORIDE 500 MG: 100 INJECTION, SOLUTION INTRAMUSCULAR; INTRAVENOUS at 21:38

## 2024-08-10 RX ADMIN — ALBUTEROL SULFATE 2.5 MG: 2.5 SOLUTION RESPIRATORY (INHALATION) at 16:11

## 2024-08-10 RX ADMIN — MAGNESIUM SULFATE HEPTAHYDRATE 2 G: 40 INJECTION, SOLUTION INTRAVENOUS at 12:44

## 2024-08-10 RX ADMIN — POTASSIUM CHLORIDE 10 MEQ: 7.46 INJECTION, SOLUTION INTRAVENOUS at 14:03

## 2024-08-10 RX ADMIN — BUSPIRONE HYDROCHLORIDE 15 MG: 15 TABLET ORAL at 16:47

## 2024-08-10 RX ADMIN — POTASSIUM CHLORIDE 10 MEQ: 7.46 INJECTION, SOLUTION INTRAVENOUS at 18:13

## 2024-08-10 RX ADMIN — ACETAMINOPHEN 325MG 650 MG: 325 TABLET ORAL at 16:47

## 2024-08-10 RX ADMIN — Medication 2.5 MG: at 23:01

## 2024-08-10 RX ADMIN — SPIRONOLACTONE 25 MG: 25 TABLET, FILM COATED ORAL at 18:32

## 2024-08-10 RX ADMIN — POTASSIUM CHLORIDE 10 MEQ: 7.46 INJECTION, SOLUTION INTRAVENOUS at 15:54

## 2024-08-10 NOTE — H&P
"HISTORY AND PHYSICAL   [facility]        Patient Identification:  Name: Javi Doran  Age: 64 y.o.  Sex: male  :  1960  MRN: [id number]                     Primary Care Physician: Ginette Bryant MD    Chief Complaint: Short of air, leg swelling.    History of Present Illness:   64-year-old male with longstanding history of alcohol abuse, tobacco abuse, CHF... With multiple admissions related to the above conditions.  Usually he is seen at [facility] and apparently they are very familiar with him.  He was just discharged from [facility] where he was treated for alcoholic ketoacidosis.  He presents to this facility complaining of \"my feet are swelling again.\".  This has been associated with orthopnea as well as dyspnea on exertion.  Medical compliance appears doubtful.  The medication list that he presented with has multiple inconsistencies especially when compared to the discharge medication list from his recent discharge at [facility].  No complaints of chest pain, palpitations, lightheadedness.  His primary complaint now is that he is \"nervous\" and want something for his nerves.  There is also history of benzodiazepine abuse.  He states his last drink of alcohol was \"a few days ago\".  He is still smoking but states \"I cut back\".  Prior medical history also includes paroxysmal atrial fibrillation, hypertrophic cardiomyopathy with a history of myomectomy with maze and mitral valve repair.  He has undergone an ablative procedure for his atrial fibrillation.  He also has coronary artery disease and has previously undergone angioplasty with coronary artery stenting.  Records from [facility] state \"multiple PCI\".  An echocardiogram done at [facility] in  reports an LVEF of 36%.          Past Medical History:  Past Medical History:   Diagnosis Date    Alcoholism     Atrial fibrillation     Constipation     Depression     Depression with anxiety     Obstructive hypertrophic " cardiomyopathy     Persistent insomnia     Solitary pulmonary nodule on lung CT      Past Surgical History:  Past Surgical History:   Procedure Laterality Date    ADENOIDECTOMY      CARDIAC SURGERY      HEMORRHOIDECTOMY      OTHER SURGICAL HISTORY      PTCA: DIAGNOSTIC CATH CORONARY DOMINANCE    OTHER SURGICAL HISTORY      TONSILLECTOMY WITH ADENOIDECTOMY    TONSILLECTOMY        Home Meds:  Facility-Administered Medications Prior to Admission   Medication Dose Route Frequency Provider Last Rate Last Admin    [DISCONTINUED] cyanocobalamin injection 1,000 mcg  1,000 mcg Intramuscular Q30 Days Duke Razo DO   1,000 mcg at 02/18/16 0949     Medications Prior to Admission   Medication Sig Dispense Refill Last Dose    amiodarone (PACERONE) 200 MG tablet Take 1 tablet by mouth Daily.   8/9/2024    atorvastatin (LIPITOR) 80 MG tablet Take 1 tablet by mouth Every Night.   8/9/2024    furosemide (LASIX) 20 MG tablet Take 2 tablets by mouth 2 (Two) Times a Day.   8/9/2024    losartan (COZAAR) 25 MG tablet Take 1 tablet by mouth Daily.   Past Month    metoprolol tartrate (LOPRESSOR) 25 MG tablet Take 1 tablet by mouth 2 (Two) Times a Day.   8/9/2024    rivaroxaban (XARELTO) 15 MG tablet Take 1 tablet by mouth Daily With Dinner.   Past Week    sertraline (ZOLOFT) 100 MG tablet Take 1 tablet by mouth Every Night.   Past Week    spironolactone (ALDACTONE) 25 MG tablet Take 1 tablet by mouth Daily.   Past Month    albuterol sulfate  (90 Base) MCG/ACT inhaler Inhale 2 puffs Every 4 (Four) Hours As Needed for Wheezing.   Unknown    busPIRone (BUSPAR) 15 MG tablet Take 1 tablet by mouth 2 (Two) Times a Day As Needed (anxiety). Has not used in the past 6 months but has if needed   More than a month    potassium chloride 10 MEQ CR tablet Take 2 tablets by mouth 2 (Two) Times a Day. Has not filled in 3 months and has been using OTC products, however has not been consistently taking these.   More than a month     Umeclidinium-Vilanterol (Anoro Ellipta) 62.5-25 MCG/ACT aerosol powder  inhaler Inhale 1 puff Daily.   More than a month       Allergies:  No Known Allergies  Immunizations:    There is no immunization history on file for this patient.  Social History:   Social History     Social History Narrative    Not on file     Social History     Tobacco Use    Smoking status: Every Day     Current packs/day: 1.00     Types: Cigarettes    Smokeless tobacco: Not on file   Substance Use Topics    Alcohol use: Yes     Comment: former alcoholic. drinks occassionally.     Family History:  Family History   Problem Relation Age of Onset    Cardiomyopathy Brother         Review of Systems  Review of Systems   Constitutional: Negative.    HENT: Negative.     Eyes: Negative.    Respiratory:          As per history of present illness.   Cardiovascular:         As per history of present illness.   Gastrointestinal: Negative.    Endocrine: Negative.    Genitourinary: Negative.    Musculoskeletal: Negative.    Skin: Negative.    Allergic/Immunologic: Negative.    Neurological: Negative.    Hematological: Negative.    Psychiatric/Behavioral:          As per history of present illness.       Objective:  T Max 24 hrs: Temp (24hrs), Av.9 °F (36.6 °C), Min:97.2 °F (36.2 °C), Max:98.6 °F (37 °C)    Vitals Ranges:   Temp:  [97.2 °F (36.2 °C)-98.6 °F (37 °C)] 98.6 °F (37 °C)  Heart Rate:  [52-68] 52  Resp:  [18] 18  BP: ()/(35-72) 98/35      Exam:  Physical Exam  Constitutional:       General: He is not in acute distress.     Appearance: Normal appearance. He is normal weight. He is not ill-appearing, toxic-appearing or diaphoretic.   HENT:      Head: Normocephalic and atraumatic.      Right Ear: External ear normal.      Left Ear: External ear normal.      Nose: Nose normal.      Mouth/Throat:      Mouth: Mucous membranes are moist.   Eyes:      General: No scleral icterus.        Right eye: No discharge.         Left eye: No discharge.       Extraocular Movements: Extraocular movements intact.      Conjunctiva/sclera: Conjunctivae normal.   Cardiovascular:      Rate and Rhythm: Normal rate and regular rhythm.      Heart sounds:      No friction rub.   Pulmonary:      Effort: Pulmonary effort is normal. No respiratory distress.      Breath sounds: No stridor. Rhonchi present. No wheezing or rales.      Comments: Loose rhonchi throughout.  Minimal increase in expiratory phase.  Chest:      Chest wall: No tenderness.   Abdominal:      General: Abdomen is flat. Bowel sounds are normal. There is no distension.      Palpations: Abdomen is soft. There is no mass.      Tenderness: There is no abdominal tenderness. There is no guarding or rebound.   Musculoskeletal:      Cervical back: Neck supple.      Right lower leg: Edema present.      Left lower leg: Edema present.      Comments: 2-3+ edema below the knees bilaterally.   Skin:     General: Skin is warm and dry.   Neurological:      General: No focal deficit present.      Mental Status: He is alert and oriented to person, place, and time.   Psychiatric:         Mood and Affect: Mood normal.         Behavior: Behavior normal.         Thought Content: Thought content normal.         Judgment: Judgment normal.         Data Review:  All labs and radiology reviewed.    Assessment:  Acute on chronic systolic CHF: Will initiate diuresis and send his potassium is within a safe range.  From review of records this is a recurrent issue.  Appears to be serious compliance issues.  LVEF in June 36%.  Hypokalemia: Presently undergoing replacement.  Continue to monitor closely.  EDMOND: Monitor closely with diuresis.  There is a reasonable chance this will improve with improvement of his fluid balance.  Avoid nephrotoxins.  Check postvoid residual.  Chronic anticoagulation: Medication reconciliation list initially stated he is on Coumadin.  [facility] discharge summary states he is supposed be on Xarelto.  Continue with  Xarelto for the time being  Paroxysmal afibrillation: Continue anticoagulation and rate control.  Coronary disease: Continue antiplatelets.  Presently no complaints of angina.  Alcohol abuse: States his last drink was several days ago.  Alcohol level presently 0.  Monitor for withdrawal.  Thiamine supplement.  Anemia: Appears chronic.  Check iron levels, B12, folate.  Monitor.  Noted there is mention of previous B12 deficiency.  COPD: Provide as needed bronchodilators.  Noncompliance:  Tobacco abuse:  Hypertension: By history.  Blood pressure presently on the low side.  Will need adjustments of medications specially with the diuresis.  Hypotension: Hold Cozaar.  Monitor closely.      Plan:  Please see above.  Discussed with patient.  Discussed with ER provider  Discussed with RN.    Mp Sethi MD  8/10/2024  14:17 EDT    EMR Dragon/Transcription disclaimer:   Much of this encounter note is an electronic transcription/translation of spoken language to printed text. The electronic translation of spoken language may permit erroneous, or at times, nonsensical words or phrases to be inadvertently transcribed; Although I have reviewed the note for such errors, some may still exist.

## 2024-08-10 NOTE — ED PROVIDER NOTES
EMERGENCY DEPARTMENT ENCOUNTER  Room Number:  SLAVA/SLAVA  Date of encounter:  8/10/2024  PCP: Ginette Bryant MD  Patient Care Team:  Ginette Bryant MD as PCP - General (Family Medicine)     HPI:  Context: Javi Doran is a 64 y.o. male who presents to the ED c/o chief complaint of lower extremity swelling.  Patient reports that he has had lower extremity swelling for the last 2 days.  Patient is on a diuretic, Lasix, twice a day, unsure of dosage, reports he has been taking it, denies any recent change.  Patient complains of shortness of breath with exertion, orthopnea, paroxysmal nocturnal dyspnea.  Patient denies any chest pain.  Patient does endorse cough, occasionally productive.  No vomiting or diarrhea, no fevers.    MEDICAL HISTORY REVIEW  Reviewed in EPIC    PAST MEDICAL HISTORY  Active Ambulatory Problems     Diagnosis Date Noted    Elevated alanine aminotransferase (ALT) level 01/21/2016    Anemia 01/21/2016    Anxiety 01/21/2016    Coronary arteriosclerosis in native artery 01/21/2016    Cobalamin deficiency 01/21/2016    Mass of breast 01/21/2016    Chronic kidney disease 01/21/2016    Congestive heart failure 01/21/2016    Constipation 01/21/2016    Gastroesophageal reflux disease 01/21/2016    Fatigue 01/21/2016    Gastric ulcer 01/21/2016    Gynecomastia 01/21/2016    History of alcohol abuse 01/21/2016    Hyperlipidemia 01/21/2016    Hypogonadism in male 01/21/2016    Major depressive disorder, recurrent episode 01/21/2016    Primary insomnia 01/21/2016    Temporary cerebral vascular dysfunction 01/21/2016    Essential hypertension 01/21/2016    Benzodiazepine misuse 03/18/2016     Resolved Ambulatory Problems     Diagnosis Date Noted    No Resolved Ambulatory Problems     Past Medical History:   Diagnosis Date    Alcoholism     Atrial fibrillation     Depression     Depression with anxiety     Obstructive hypertrophic cardiomyopathy     Persistent  insomnia     Solitary pulmonary nodule on lung CT        PAST SURGICAL HISTORY  Past Surgical History:   Procedure Laterality Date    ADENOIDECTOMY      CARDIAC SURGERY      HEMORRHOIDECTOMY      OTHER SURGICAL HISTORY      PTCA: DIAGNOSTIC CATH CORONARY DOMINANCE    OTHER SURGICAL HISTORY      TONSILLECTOMY WITH ADENOIDECTOMY    TONSILLECTOMY         FAMILY HISTORY  Family History   Problem Relation Age of Onset    Cardiomyopathy Brother        SOCIAL HISTORY  Social History     Socioeconomic History    Marital status: Single   Tobacco Use    Smoking status: Every Day     Current packs/day: 1.00     Types: Cigarettes   Substance and Sexual Activity    Alcohol use: Yes     Comment: former alcoholic. drinks occassionally.    Drug use: No    Sexual activity: Defer       ALLERGIES  Patient has no known allergies.    The patient's allergies have been reviewed    REVIEW OF SYSTEMS  All systems reviewed and negative except for those discussed in HPI.     PHYSICAL EXAM  I have reviewed the triage vital signs and nursing notes.  ED Triage Vitals [08/10/24 1126]   Temp Heart Rate Resp BP SpO2   97.2 °F (36.2 °C) 68 18 -- 95 %      Temp src Heart Rate Source Patient Position BP Location FiO2 (%)   Tympanic Monitor -- -- --       General: No acute distress.  HENT: NCAT, PERRL, Nares patent.  Eyes: no scleral icterus.  Neck: trachea midline, no ROM limitations.  CV: regular rhythm, regular rate.  Respiratory: normal effort, CTAB.  Abdomen: soft, nondistended, NTTP, no rebound tenderness, no guarding or rigidity.  Musculoskeletal: no deformity.  Neuro: alert, moves all extremities, follows commands.  Skin: warm, dry.  1+ pitting edema bilateral lower extremities.    LAB RESULTS  Recent Results (from the past 24 hour(s))   ECG 12 Lead Other; LE edema    Collection Time: 08/10/24 11:36 AM   Result Value Ref Range    QT Interval 589 ms    QTC Interval 631 ms   Comprehensive Metabolic Panel    Collection Time: 08/10/24 11:41 AM     Specimen: Blood   Result Value Ref Range    Glucose 132 (H) 65 - 99 mg/dL    BUN 21 8 - 23 mg/dL    Creatinine 1.59 (H) 0.76 - 1.27 mg/dL    Sodium 134 (L) 136 - 145 mmol/L    Potassium 1.8 (C) 3.5 - 5.2 mmol/L    Chloride 89 (L) 98 - 107 mmol/L    CO2 30.0 (H) 22.0 - 29.0 mmol/L    Calcium 8.6 8.6 - 10.5 mg/dL    Total Protein 6.7 6.0 - 8.5 g/dL    Albumin 4.1 3.5 - 5.2 g/dL    ALT (SGPT) 37 1 - 41 U/L    AST (SGOT) 77 (H) 1 - 40 U/L    Alkaline Phosphatase 219 (H) 39 - 117 U/L    Total Bilirubin 2.6 (H) 0.0 - 1.2 mg/dL    Globulin 2.6 gm/dL    A/G Ratio 1.6 g/dL    BUN/Creatinine Ratio 13.2 7.0 - 25.0    Anion Gap 15.0 5.0 - 15.0 mmol/L    eGFR 48.5 (L) >60.0 mL/min/1.73   High Sensitivity Troponin T    Collection Time: 08/10/24 11:41 AM    Specimen: Blood   Result Value Ref Range    HS Troponin T 50 (H) <22 ng/L   BNP    Collection Time: 08/10/24 11:41 AM    Specimen: Blood   Result Value Ref Range    proBNP 10,960.0 (H) 0.0 - 900.0 pg/mL   Magnesium    Collection Time: 08/10/24 11:41 AM    Specimen: Blood   Result Value Ref Range    Magnesium 2.0 1.6 - 2.4 mg/dL   Protime-INR    Collection Time: 08/10/24 11:41 AM    Specimen: Blood   Result Value Ref Range    Protime 33.8 (H) 11.7 - 14.2 Seconds    INR 3.30 (H) 0.90 - 1.10   aPTT    Collection Time: 08/10/24 11:41 AM    Specimen: Blood   Result Value Ref Range    PTT 46.6 (H) 22.7 - 35.4 seconds   CBC Auto Differential    Collection Time: 08/10/24 11:41 AM    Specimen: Blood   Result Value Ref Range    WBC 8.70 3.40 - 10.80 10*3/mm3    RBC 3.56 (L) 4.14 - 5.80 10*6/mm3    Hemoglobin 9.6 (L) 13.0 - 17.7 g/dL    Hematocrit 30.2 (L) 37.5 - 51.0 %    MCV 84.8 79.0 - 97.0 fL    MCH 27.0 26.6 - 33.0 pg    MCHC 31.8 31.5 - 35.7 g/dL    RDW 19.8 (H) 12.3 - 15.4 %    RDW-SD 61.1 (H) 37.0 - 54.0 fl    MPV 8.5 6.0 - 12.0 fL    Platelets 222 140 - 450 10*3/mm3    Neutrophil % 79.4 (H) 42.7 - 76.0 %    Lymphocyte % 9.4 (L) 19.6 - 45.3 %    Monocyte % 9.9 5.0 - 12.0 %     Eosinophil % 0.5 0.3 - 6.2 %    Basophil % 0.5 0.0 - 1.5 %    Immature Grans % 0.3 0.0 - 0.5 %    Neutrophils, Absolute 6.91 1.70 - 7.00 10*3/mm3    Lymphocytes, Absolute 0.82 0.70 - 3.10 10*3/mm3    Monocytes, Absolute 0.86 0.10 - 0.90 10*3/mm3    Eosinophils, Absolute 0.04 0.00 - 0.40 10*3/mm3    Basophils, Absolute 0.04 0.00 - 0.20 10*3/mm3    Immature Grans, Absolute 0.03 0.00 - 0.05 10*3/mm3    nRBC 0.0 0.0 - 0.2 /100 WBC       I ordered the above labs and reviewed the results.    RADIOLOGY  XR Chest 1 View    Result Date: 8/10/2024  Emergency portable view of the chest on 8/10/2024  CLINICAL HISTORY: This 64-year-old male patient with lower extremity edema  This correlated to a remote prior PA and lateral chest x-ray and 8/6/2017.  FINDINGS: There are multiple sternal wires miso markers from previous cardiac surgery. The cardiomediastinal silhouette is enlarged. There is mild central pulmonary vascular engorgement. There is very minimal interstitial prominence in the lungs and minimal interstitial edema cannot be excluded. I see no airspace consolidation. There is very minimal blunting of the right lateral costophrenic angle suggesting a tiny right effusion. There are old fractures of the left seventh eighth and ninth lateral ribs.      1. Patient is had a previous median sternotomy and has enlarged cardiomediastinal silhouette and mild central pulmonary vascular engorgement. There is very minimal interstitial prominence in lungs that may be chronic although difficult exclude minimal interstitial edema and correlate clinically. There is the suggestion of a tiny right effusion minimal blunting the right lateral costophrenic angle. No additional active disease is seen in the chest.  2. There are old healed fracture of the left seventh through ninth lateral ribs.  This report was finalized on 8/10/2024 12:28 PM by Dr. Duke Langston M.D on Workstation: [id number]       I ordered the above noted radiological  studies. I reviewed the images and results. I agree with the radiologist interpretation.    PROCEDURES  Procedures    MEDICATIONS GIVEN IN ER  Medications   hydrOXYzine (ATARAX) tablet 25 mg (has no administration in time range)   potassium chloride 10 mEq in 100 mL IVPB (10 mEq Intravenous New Bag 8/10/24 1242)     And   potassium chloride 10 mEq in 100 mL IVPB (has no administration in time range)     And   potassium chloride 10 mEq in 100 mL IVPB (has no administration in time range)     And   potassium chloride 10 mEq in 100 mL IVPB (has no administration in time range)     And   potassium chloride 10 mEq in 100 mL IVPB (has no administration in time range)     And   potassium chloride 10 mEq in 100 mL IVPB (has no administration in time range)   magnesium sulfate 2g/50 mL (PREMIX) infusion (2 g Intravenous New Bag 8/10/24 1244)   Pharmacy Consult (has no administration in time range)   hydrOXYzine (ATARAX) tablet 25 mg (25 mg Oral Given 8/10/24 1238)   potassium chloride (K-DUR,KLOR-CON) ER tablet 40 mEq (40 mEq Oral Given 8/10/24 1238)       PROGRESS, DATA ANALYSIS, CONSULTS, AND MEDICAL DECISION MAKING  A complete history and physical exam have been performed.  All available laboratory and imaging results have been reviewed by myself prior to disposition.    MDM    After the initial H&P, I discussed pertinent information from history and physical exam with patient/family.  Discussed differential diagnosis.  Discussed plan for ED evaluation/workup/treatment.  All questions answered.  Patient/family is agreeable with plan.  ED Course as of 08/10/24 1333   Sat Aug 10, 2024   1135 My differential diagnosis for dyspnea includes but is not limited to:  Asthma, COPD, pneumonia, pulmonary embolus, acute respiratory distress syndrome, pneumothorax, pleural effusion, pulmonary fibrosis, congestive heart failure, myocardial infarction, DKA, uremia, acidosis, sepsis, anemia, drug related, hyperventilation, CNS  disease     [JG]   1225 Severe hypokalemia, repleting orally as well as IV.  Mild EDMOND, no IV fluids at present given patient is also having lower extremity edema, no diuretics at present given EDMOND with severe hypokalemia.  Consulting hospitalist for admission. [JG]   1226 EKG independently viewed and contemporaneously interpreted by ED physician. Time: 11:36 AM.  Rate 69.  Interpretation: Normal sinus rhythm with multiple conducted PACs, normal axis, LVH, left bundle branch block with appropriate discordant ST changes. [JG]   1228 I reviewed chest x-ray in PACS, no pulmonary edema per my read. [JG]   1231 Patient reassessed.  Discussed ED findings, differential diagnosis, and the need for admission for evaluation/treatment.  They are agreeable to admission and all questions were answered.     [JG]   1239 Phone call with Dr. Rodriguez, [facility] .  Discussed the patient, relevant history, exam, diagnostics, ED findings/progress, and concerns. They agree to admit the patient to telemetry observation.  He request nephrologist be consulted from the emergency department.  Care assumed by the admitting physician at this time.     [JG]   1320 Phone call with Dr. Padilla, nephrology.  Discussed the patient, relevant history, exam, diagnostics, ED findings/progress, and concerns. They agree to consult.    [JG]      ED Course User Index  [JG] Raffi Londono MD       AS OF 13:33 EDT VITALS:    BP - 103/67  HR - 58  TEMP - 97.2 °F (36.2 °C) (Tympanic)  O2 SATS - 96%    DIAGNOSIS  Final diagnoses:   Hypokalemia   EDMOND (acute kidney injury)   Lower extremity edema   Anemia, unspecified type   Elevated troponin     Critical care:  Total critical care time of 31 minutes is exclusive of any other billable procedures and includes time spent with direct patient care and observation, retrospective chart review, management of acute condition, and consultation with other physicians.      DISPOSITION  ADMISSION    Discussed treatment plan and  reason for admission with pt/family and admitting physician.  Pt/family voiced understanding of the plan for admission for further testing/treatment as needed.        Raffi Londono MD  08/10/24 0182

## 2024-08-10 NOTE — PROGRESS NOTES
Clinical Pharmacy Services: Medication History    Javi Doran is a 64 y.o. male presenting to [facility] for Hypokalemia [E87.6]  Lower extremity edema [R60.0]  Elevated troponin [R79.89]  EDMOND (acute kidney injury) [N17.9]  Anemia, unspecified type [D64.9]    He  has a past medical history of Alcoholism, Atrial fibrillation, Constipation, Depression, Depression with anxiety, Obstructive hypertrophic cardiomyopathy, Persistent insomnia, and Solitary pulmonary nodule on lung CT.    Allergies as of 08/10/2024    (No Known Allergies)       Medication information was obtained from: patient, pharmacy, insurance claims  Pharmacy and Phone Number: [facility]    Prior to Admission Medications       Prescriptions Last Dose Informant Patient Reported? Taking?    amiodarone (PACERONE) 200 MG tablet 8/9/2024 Self, Pharmacy Yes Yes    Take 1 tablet by mouth Daily.    atorvastatin (LIPITOR) 80 MG tablet 8/9/2024 Pharmacy Yes Yes    Take 1 tablet by mouth Every Night.    furosemide (LASIX) 20 MG tablet 8/9/2024 Pharmacy, Self Yes Yes    Take 2 tablets by mouth 2 (Two) Times a Day.    losartan (COZAAR) 25 MG tablet Past Month  Yes Yes    Take 1 tablet by mouth Daily.    metoprolol tartrate (LOPRESSOR) 25 MG tablet 8/9/2024 Pharmacy, Self Yes Yes    Take 1 tablet by mouth 2 (Two) Times a Day.    rivaroxaban (XARELTO) 15 MG tablet Past Week Pharmacy, Self Yes Yes    Take 1 tablet by mouth Daily With Dinner.    sertraline (ZOLOFT) 100 MG tablet Past Week Self Yes Yes    Take 1 tablet by mouth Every Night.    spironolactone (ALDACTONE) 25 MG tablet Past Month Pharmacy, Self Yes Yes    Take 1 tablet by mouth Daily.    albuterol sulfate  (90 Base) MCG/ACT inhaler Unknown  Yes No    Inhale 2 puffs Every 4 (Four) Hours As Needed for Wheezing.    busPIRone (BUSPAR) 15 MG tablet More than a month Self, Pharmacy Yes No    Take 1 tablet by mouth 2 (Two) Times a Day As Needed (anxiety). Has not used in the past 6  months but has if needed    potassium chloride 10 MEQ CR tablet More than a month Self Yes No    Take 2 tablets by mouth 2 (Two) Times a Day. Has not filled in 3 months and has been using OTC products, however has not been consistently taking these.    Umeclidinium-Vilanterol (Anoro Ellipta) 62.5-25 MCG/ACT aerosol powder  inhaler More than a month  Yes No    Inhale 1 puff Daily.              Medication notes: Interviewed patient, confirmed prescription medications. Patient does not remember specifics very well but was able to confirm med names and frequency. Of note, patient was on warfarin previously and switched to Xarelto, most recent dose was decreased in June to 15mg daily with dinner for indication AF.     Not adherent to KCl tablets, last fill was in May of 2024.   Recent admission to [place] where he was started on spironolactone and losartan, but never refilled.   Brother initially helped him manage medications at most recent hospital discharge, has a pill box, but will need to be counseled on med adherence and strategies.     This medication list is complete to the best of my knowledge as of 8/10/2024    Please call if questions.    Rubina Valentin, PharmD   8/10/2024 13:51 EDT

## 2024-08-10 NOTE — ED NOTES
Nursing report ED to floor  Javi Doran  64 y.o.  male    HPI :  HPI (Adult)  Stated Reason for Visit: swelling to BLE x 2 days.    Chief Complaint  Chief Complaint   Patient presents with    Leg Swelling    Edema       Admitting doctor:   Tai Rodriguez DO    Admitting diagnosis:   The primary encounter diagnosis was Hypokalemia. Diagnoses of EDMOND (acute kidney injury), Lower extremity edema, Anemia, unspecified type, and Elevated troponin were also pertinent to this visit.    Code status:   Current Code Status       Date Active Code Status Order ID Comments User Context       Not on file            Allergies:   Patient has no known allergies.    Isolation:   No active isolations    Intake and Output  No intake or output data in the 24 hours ending 08/10/24 1246    Weight:   There were no vitals filed for this visit.    Most recent vitals:   Vitals:    08/10/24 1126 08/10/24 1131   BP:  107/72   Pulse: 68 62   Resp: 18    Temp: 97.2 °F (36.2 °C)    TempSrc: Tympanic    SpO2: 95% 100%       Active LDAs/IV Access:   Lines, Drains & Airways       Active LDAs       Name Placement date Placement time Site Days    Peripheral IV 08/10/24 1142 Anterior;Right Forearm 08/10/24  1142  Forearm  less than 1                    Labs (abnormal labs have a star):   Labs Reviewed   COMPREHENSIVE METABOLIC PANEL - Abnormal; Notable for the following components:       Result Value    Glucose 132 (*)     Creatinine 1.59 (*)     Sodium 134 (*)     Potassium 1.8 (*)     Chloride 89 (*)     CO2 30.0 (*)     AST (SGOT) 77 (*)     Alkaline Phosphatase 219 (*)     Total Bilirubin 2.6 (*)     eGFR 48.5 (*)     All other components within normal limits    Narrative:     GFR Normal >60  Chronic Kidney Disease <60  Kidney Failure <15     TROPONIN - Abnormal; Notable for the following components:    HS Troponin T 50 (*)     All other components within normal limits    Narrative:     High Sensitive Troponin T Reference Range:  <14.0 ng/L-  Negative Female for AMI  <22.0 ng/L- Negative Male for AMI  >=14 - Abnormal Female indicating possible myocardial injury.  >=22 - Abnormal Male indicating possible myocardial injury.   Clinicians would have to utilize clinical acumen, EKG, Troponin, and serial changes to determine if it is an Acute Myocardial Infarction or myocardial injury due to an underlying chronic condition.        BNP (IN-HOUSE) - Abnormal; Notable for the following components:    proBNP 10,960.0 (*)     All other components within normal limits    Narrative:     This assay is used as an aid in the diagnosis of individuals suspected of having heart failure. It can be used as an aid in the diagnosis of acute decompensated heart failure (ADHF) in patients presenting with signs and symptoms of ADHF to the emergency department (ED). In addition, NT-proBNP of <300 pg/mL indicates ADHF is not likely.    Age Range Result Interpretation  NT-proBNP Concentration (pg/mL:      <50             Positive            >450                   Gray                 300-450                    Negative             <300    50-75           Positive            >900                  Gray                300-900                  Negative            <300      >75             Positive            >1800                  Gray                300-1800                  Negative            <300   PROTIME-INR - Abnormal; Notable for the following components:    Protime 33.8 (*)     INR 3.30 (*)     All other components within normal limits   APTT - Abnormal; Notable for the following components:    PTT 46.6 (*)     All other components within normal limits   CBC WITH AUTO DIFFERENTIAL - Abnormal; Notable for the following components:    RBC 3.56 (*)     Hemoglobin 9.6 (*)     Hematocrit 30.2 (*)     RDW 19.8 (*)     RDW-SD 61.1 (*)     Neutrophil % 79.4 (*)     Lymphocyte % 9.4 (*)     All other components within normal limits   MAGNESIUM - Normal   RESPIRATORY PANEL PCR W/  COVID-19 (SARS-COV-2), NP SWAB IN UTM/VTP, 2 HR TAT   HIGH SENSITIVITIY TROPONIN T 2HR   CBC AND DIFFERENTIAL    Narrative:     The following orders were created for panel order CBC & Differential.  Procedure                               Abnormality         Status                     ---------                               -----------         ------                     CBC Auto Differential[[id number]]        Abnormal            Final result                 Please view results for these tests on the individual orders.       EKG:   ECG 12 Lead Other; LE edema   Preliminary Result   HEART RATE=69  bpm   RR Sjvaxxff=602  ms   TX Interval=68  ms   P Horizontal Axis=-10  deg   P Front Axis=104  deg   QRSD Mhhkhdzj=387  ms   QT Nrridzwx=961  ms   FHbC=112  ms   QRS Axis=0  deg   T Wave Axis=220  deg   - ABNORMAL ECG -   Sinus rhythm   Supraventricular bigeminy   Short TX interval   Biatrial enlargement   IVCD, consider atypical LBBB   Date and Time of Study:2024-08-10 11:36:24          Meds given in ED:   Medications   hydrOXYzine (ATARAX) tablet 25 mg (has no administration in time range)   potassium chloride 10 mEq in 100 mL IVPB (10 mEq Intravenous New Bag 8/10/24 1242)     And   potassium chloride 10 mEq in 100 mL IVPB (has no administration in time range)     And   potassium chloride 10 mEq in 100 mL IVPB (has no administration in time range)     And   potassium chloride 10 mEq in 100 mL IVPB (has no administration in time range)     And   potassium chloride 10 mEq in 100 mL IVPB (has no administration in time range)     And   potassium chloride 10 mEq in 100 mL IVPB (has no administration in time range)   magnesium sulfate 2g/50 mL (PREMIX) infusion (2 g Intravenous New Bag 8/10/24 1244)   hydrOXYzine (ATARAX) tablet 25 mg (25 mg Oral Given 8/10/24 1238)   potassium chloride (K-DUR,KLOR-CON) ER tablet 40 mEq (40 mEq Oral Given 8/10/24 1238)       Imaging results:  XR Chest 1 View    Result Date: 8/10/2024  1.  Patient is had a previous median sternotomy and has enlarged cardiomediastinal silhouette and mild central pulmonary vascular engorgement. There is very minimal interstitial prominence in lungs that may be chronic although difficult exclude minimal interstitial edema and correlate clinically. There is the suggestion of a tiny right effusion minimal blunting the right lateral costophrenic angle. No additional active disease is seen in the chest.  2. There are old healed fracture of the left seventh through ninth lateral ribs.  This report was finalized on 8/10/2024 12:28 PM by Dr. Duke Langston M.D on Workstation: [id number]       Ambulatory status:   - assist x1    Social issues:   Social History     Socioeconomic History    Marital status: Single   Tobacco Use    Smoking status: Every Day     Current packs/day: 1.00     Types: Cigarettes   Substance and Sexual Activity    Alcohol use: Yes     Comment: former alcoholic. drinks occassionally.    Drug use: No    Sexual activity: Defer       Peripheral Neurovascular  Peripheral Neurovascular (Adult)  Peripheral Neurovascular WDL: .WDL except  Additional Documentation: Edema (Group)  Edema  Edema: ankle, left, ankle, right, foot, right, foot, left  Ankle, Left Edema: 1+ (Trace)  Ankle, Right Edema: 1+ (Trace)  Foot, Left Edema: 3+ (Moderate)  Foot, Right Edema: 3+ (Moderate)    Neuro Cognitive  Neuro Cognitive (Adult)  Cognitive/Neuro/Behavioral WDL: WDL, level of consciousness, mood/behavior, orientation, speech  Level of Consciousness: Alert  Orientation: oriented x 4  Speech: clear  Mood/Behavior: calm, cooperative    Learning  Learning Assessment (Adult)  Learning Readiness and Ability: no barriers identified    Respiratory  Respiratory WDL  Respiratory WDL: WDL    Abdominal Pain       Pain Assessments  Pain (Adult)  (0-10) Pain Rating: Rest: 5  (0-10) Pain Rating: Activity: 5    NIH Stroke Scale       Celeste Wilhelm, RN  08/10/24 12:46 EDT

## 2024-08-11 PROBLEM — N17.9 AKI (ACUTE KIDNEY INJURY): Status: ACTIVE | Noted: 2024-08-11

## 2024-08-11 PROBLEM — F10.930 ALCOHOL WITHDRAWAL SYNDROME WITHOUT COMPLICATION: Status: ACTIVE | Noted: 2024-08-11

## 2024-08-11 LAB
ALBUMIN SERPL-MCNC: 3.6 G/DL (ref 3.5–5.2)
ALBUMIN/GLOB SERPL: 1.5 G/DL
ALP SERPL-CCNC: 215 U/L (ref 39–117)
ALT SERPL W P-5'-P-CCNC: 36 U/L (ref 1–41)
ANION GAP SERPL CALCULATED.3IONS-SCNC: 16 MMOL/L (ref 5–15)
AST SERPL-CCNC: 73 U/L (ref 1–40)
BASOPHILS # BLD AUTO: 0.05 10*3/MM3 (ref 0–0.2)
BASOPHILS NFR BLD AUTO: 0.6 % (ref 0–1.5)
BILIRUB SERPL-MCNC: 2.7 MG/DL (ref 0–1.2)
BUN SERPL-MCNC: 29 MG/DL (ref 8–23)
BUN/CREAT SERPL: 15.3 (ref 7–25)
CALCIUM SPEC-SCNC: 8.2 MG/DL (ref 8.6–10.5)
CHLORIDE SERPL-SCNC: 93 MMOL/L (ref 98–107)
CO2 SERPL-SCNC: 27 MMOL/L (ref 22–29)
CREAT SERPL-MCNC: 1.89 MG/DL (ref 0.76–1.27)
DEPRECATED RDW RBC AUTO: 62.1 FL (ref 37–54)
EGFRCR SERPLBLD CKD-EPI 2021: 39.4 ML/MIN/1.73
EOSINOPHIL # BLD AUTO: 0.09 10*3/MM3 (ref 0–0.4)
EOSINOPHIL NFR BLD AUTO: 1 % (ref 0.3–6.2)
ERYTHROCYTE [DISTWIDTH] IN BLOOD BY AUTOMATED COUNT: 20.3 % (ref 12.3–15.4)
FOLATE SERPL-MCNC: >20 NG/ML (ref 4.78–24.2)
GLOBULIN UR ELPH-MCNC: 2.4 GM/DL
GLUCOSE SERPL-MCNC: 85 MG/DL (ref 65–99)
HCT VFR BLD AUTO: 27.8 % (ref 37.5–51)
HGB BLD-MCNC: 8.8 G/DL (ref 13–17.7)
IMM GRANULOCYTES # BLD AUTO: 0.05 10*3/MM3 (ref 0–0.05)
IMM GRANULOCYTES NFR BLD AUTO: 0.6 % (ref 0–0.5)
IRON 24H UR-MRATE: 44 MCG/DL (ref 59–158)
IRON SATN MFR SERPL: 10 % (ref 20–50)
LYMPHOCYTES # BLD AUTO: 1.04 10*3/MM3 (ref 0.7–3.1)
LYMPHOCYTES NFR BLD AUTO: 11.5 % (ref 19.6–45.3)
MAGNESIUM SERPL-MCNC: 2.3 MG/DL (ref 1.6–2.4)
MCH RBC QN AUTO: 26.7 PG (ref 26.6–33)
MCHC RBC AUTO-ENTMCNC: 31.7 G/DL (ref 31.5–35.7)
MCV RBC AUTO: 84.5 FL (ref 79–97)
MONOCYTES # BLD AUTO: 0.89 10*3/MM3 (ref 0.1–0.9)
MONOCYTES NFR BLD AUTO: 9.8 % (ref 5–12)
NEUTROPHILS NFR BLD AUTO: 6.93 10*3/MM3 (ref 1.7–7)
NEUTROPHILS NFR BLD AUTO: 76.5 % (ref 42.7–76)
NRBC BLD AUTO-RTO: 0 /100 WBC (ref 0–0.2)
PHOSPHATE SERPL-MCNC: 4 MG/DL (ref 2.5–4.5)
PLATELET # BLD AUTO: 226 10*3/MM3 (ref 140–450)
PMV BLD AUTO: 9.1 FL (ref 6–12)
POTASSIUM SERPL-SCNC: 2.1 MMOL/L (ref 3.5–5.2)
POTASSIUM SERPL-SCNC: 2.5 MMOL/L (ref 3.5–5.2)
PROT SERPL-MCNC: 6 G/DL (ref 6–8.5)
RBC # BLD AUTO: 3.29 10*6/MM3 (ref 4.14–5.8)
SODIUM SERPL-SCNC: 136 MMOL/L (ref 136–145)
T4 FREE SERPL-MCNC: 1.6 NG/DL (ref 0.92–1.68)
TIBC SERPL-MCNC: 446 MCG/DL (ref 298–536)
TRANSFERRIN SERPL-MCNC: 299 MG/DL (ref 200–360)
TSH SERPL DL<=0.05 MIU/L-ACNC: 8.41 UIU/ML (ref 0.27–4.2)
VIT B12 BLD-MCNC: 716 PG/ML (ref 211–946)
WBC NRBC COR # BLD AUTO: 9.05 10*3/MM3 (ref 3.4–10.8)

## 2024-08-11 PROCEDURE — 97530 THERAPEUTIC ACTIVITIES: CPT

## 2024-08-11 PROCEDURE — 97162 PT EVAL MOD COMPLEX 30 MIN: CPT

## 2024-08-11 PROCEDURE — 82746 ASSAY OF FOLIC ACID SERUM: CPT | Performed by: HOSPITALIST

## 2024-08-11 PROCEDURE — 84443 ASSAY THYROID STIM HORMONE: CPT | Performed by: HOSPITALIST

## 2024-08-11 PROCEDURE — 90791 PSYCH DIAGNOSTIC EVALUATION: CPT

## 2024-08-11 PROCEDURE — 83735 ASSAY OF MAGNESIUM: CPT | Performed by: HOSPITALIST

## 2024-08-11 PROCEDURE — 84100 ASSAY OF PHOSPHORUS: CPT | Performed by: HOSPITALIST

## 2024-08-11 PROCEDURE — 80053 COMPREHEN METABOLIC PANEL: CPT | Performed by: HOSPITALIST

## 2024-08-11 PROCEDURE — 84132 ASSAY OF SERUM POTASSIUM: CPT | Performed by: HOSPITALIST

## 2024-08-11 PROCEDURE — 99222 1ST HOSP IP/OBS MODERATE 55: CPT | Performed by: INTERNAL MEDICINE

## 2024-08-11 PROCEDURE — 94761 N-INVAS EAR/PLS OXIMETRY MLT: CPT

## 2024-08-11 PROCEDURE — 83540 ASSAY OF IRON: CPT | Performed by: HOSPITALIST

## 2024-08-11 PROCEDURE — 84466 ASSAY OF TRANSFERRIN: CPT | Performed by: HOSPITALIST

## 2024-08-11 PROCEDURE — 94664 DEMO&/EVAL PT USE INHALER: CPT

## 2024-08-11 PROCEDURE — 94799 UNLISTED PULMONARY SVC/PX: CPT

## 2024-08-11 PROCEDURE — 84439 ASSAY OF FREE THYROXINE: CPT | Performed by: HOSPITALIST

## 2024-08-11 PROCEDURE — 82607 VITAMIN B-12: CPT | Performed by: HOSPITALIST

## 2024-08-11 PROCEDURE — 25010000002 THIAMINE HCL 200 MG/2ML SOLUTION: Performed by: HOSPITALIST

## 2024-08-11 PROCEDURE — 25010000002 ONDANSETRON PER 1 MG: Performed by: HOSPITALIST

## 2024-08-11 PROCEDURE — 85025 COMPLETE CBC W/AUTO DIFF WBC: CPT | Performed by: HOSPITALIST

## 2024-08-11 RX ORDER — MULTIPLE VITAMINS W/ MINERALS TAB 9MG-400MCG
1 TAB ORAL DAILY
Status: DISCONTINUED | OUTPATIENT
Start: 2024-08-12 | End: 2024-08-19 | Stop reason: HOSPADM

## 2024-08-11 RX ORDER — PANTOPRAZOLE SODIUM 40 MG/1
40 TABLET, DELAYED RELEASE ORAL
Status: DISCONTINUED | OUTPATIENT
Start: 2024-08-12 | End: 2024-08-19 | Stop reason: HOSPADM

## 2024-08-11 RX ORDER — FOLIC ACID 1 MG/1
1 TABLET ORAL DAILY
Status: DISCONTINUED | OUTPATIENT
Start: 2024-08-12 | End: 2024-08-19 | Stop reason: HOSPADM

## 2024-08-11 RX ORDER — LORAZEPAM 2 MG/ML
1 INJECTION INTRAMUSCULAR
Status: DISCONTINUED | OUTPATIENT
Start: 2024-08-11 | End: 2024-08-14

## 2024-08-11 RX ORDER — LORAZEPAM 1 MG/1
2 TABLET ORAL
Status: DISCONTINUED | OUTPATIENT
Start: 2024-08-11 | End: 2024-08-14

## 2024-08-11 RX ORDER — POTASSIUM CHLORIDE 7.45 MG/ML
10 INJECTION INTRAVENOUS
Status: COMPLETED | OUTPATIENT
Start: 2024-08-11 | End: 2024-08-12

## 2024-08-11 RX ORDER — POTASSIUM CHLORIDE 750 MG/1
40 TABLET, FILM COATED, EXTENDED RELEASE ORAL
Status: COMPLETED | OUTPATIENT
Start: 2024-08-11 | End: 2024-08-11

## 2024-08-11 RX ORDER — LORAZEPAM 2 MG/ML
2 INJECTION INTRAMUSCULAR
Status: DISCONTINUED | OUTPATIENT
Start: 2024-08-11 | End: 2024-08-14

## 2024-08-11 RX ORDER — METOPROLOL SUCCINATE 25 MG/1
25 TABLET, EXTENDED RELEASE ORAL
Status: DISCONTINUED | OUTPATIENT
Start: 2024-08-12 | End: 2024-08-13

## 2024-08-11 RX ORDER — LORAZEPAM 1 MG/1
1 TABLET ORAL
Status: DISCONTINUED | OUTPATIENT
Start: 2024-08-11 | End: 2024-08-14

## 2024-08-11 RX ORDER — POTASSIUM CHLORIDE 750 MG/1
40 TABLET, FILM COATED, EXTENDED RELEASE ORAL EVERY 4 HOURS
Status: COMPLETED | OUTPATIENT
Start: 2024-08-11 | End: 2024-08-12

## 2024-08-11 RX ORDER — POTASSIUM CHLORIDE 750 MG/1
40 TABLET, FILM COATED, EXTENDED RELEASE ORAL EVERY 4 HOURS
Status: DISCONTINUED | OUTPATIENT
Start: 2024-08-11 | End: 2024-08-11

## 2024-08-11 RX ORDER — THIAMINE HYDROCHLORIDE 100 MG/ML
200 INJECTION, SOLUTION INTRAMUSCULAR; INTRAVENOUS EVERY 8 HOURS SCHEDULED
Status: COMPLETED | OUTPATIENT
Start: 2024-08-11 | End: 2024-08-16

## 2024-08-11 RX ADMIN — POTASSIUM CHLORIDE 40 MEQ: 750 TABLET, EXTENDED RELEASE ORAL at 13:07

## 2024-08-11 RX ADMIN — METOPROLOL TARTRATE 25 MG: 25 TABLET, FILM COATED ORAL at 08:42

## 2024-08-11 RX ADMIN — SERTRALINE 100 MG: 100 TABLET, FILM COATED ORAL at 21:04

## 2024-08-11 RX ADMIN — AMIODARONE HYDROCHLORIDE 200 MG: 200 TABLET ORAL at 08:42

## 2024-08-11 RX ADMIN — ARFORMOTEROL TARTRATE 15 MCG: 15 SOLUTION RESPIRATORY (INHALATION) at 18:44

## 2024-08-11 RX ADMIN — ONDANSETRON 4 MG: 2 INJECTION INTRAMUSCULAR; INTRAVENOUS at 17:25

## 2024-08-11 RX ADMIN — ALBUTEROL SULFATE 2.5 MG: 2.5 SOLUTION RESPIRATORY (INHALATION) at 00:09

## 2024-08-11 RX ADMIN — THIAMINE HYDROCHLORIDE 200 MG: 100 INJECTION, SOLUTION INTRAMUSCULAR; INTRAVENOUS at 21:04

## 2024-08-11 RX ADMIN — Medication 10 ML: at 08:43

## 2024-08-11 RX ADMIN — Medication 100 MG: at 08:42

## 2024-08-11 RX ADMIN — SPIRONOLACTONE 25 MG: 25 TABLET, FILM COATED ORAL at 18:02

## 2024-08-11 RX ADMIN — POTASSIUM CHLORIDE 40 MEQ: 750 TABLET, EXTENDED RELEASE ORAL at 17:25

## 2024-08-11 RX ADMIN — LORAZEPAM 1 MG: 1 TABLET ORAL at 21:03

## 2024-08-11 RX ADMIN — ARFORMOTEROL TARTRATE 15 MCG: 15 SOLUTION RESPIRATORY (INHALATION) at 08:22

## 2024-08-11 RX ADMIN — SPIRONOLACTONE 25 MG: 25 TABLET, FILM COATED ORAL at 08:42

## 2024-08-11 RX ADMIN — ACETAMINOPHEN 325MG 650 MG: 325 TABLET ORAL at 18:02

## 2024-08-11 RX ADMIN — TIOTROPIUM BROMIDE INHALATION SPRAY 2 PUFF: 3.12 SPRAY, METERED RESPIRATORY (INHALATION) at 08:25

## 2024-08-11 RX ADMIN — LORAZEPAM 1 MG: 1 TABLET ORAL at 19:42

## 2024-08-11 RX ADMIN — RIVAROXABAN 15 MG: 15 TABLET, FILM COATED ORAL at 18:01

## 2024-08-11 RX ADMIN — Medication 10 ML: at 20:56

## 2024-08-11 RX ADMIN — ATORVASTATIN CALCIUM 80 MG: 80 TABLET, FILM COATED ORAL at 21:03

## 2024-08-11 NOTE — PLAN OF CARE
"Pt oriented vs stable, Pt becoming restless and anxious, c/o HA R shoulder pain, nausea. Talked extensively about ETOH/ depression. Reports \"last drink less than few days ago\". Pt reports mother past away in December. Does not currently have talk therapist. Reports he started drinking about age 52 d/t family circumstances and dynamics. Reports being very active work before going on disability and now lives alone. Reports AA meetings he could take but has not been yet. Consulted MD for [facility] protocol and [facility] for resources.      Problem: Adult Inpatient Plan of Care  Goal: Plan of Care Review  Outcome: Ongoing, Progressing  Goal: Patient-Specific Goal (Individualized)  Outcome: Ongoing, Progressing  Goal: Absence of Hospital-Acquired Illness or Injury  Outcome: Ongoing, Progressing  Intervention: Identify and Manage Fall Risk  Recent Flowsheet Documentation  Taken 8/11/2024 1307 by Mckenzie Magallanes, RN  Safety Promotion/Fall Prevention:   activity supervised   assistive device/personal items within reach   clutter free environment maintained   fall prevention program maintained   nonskid shoes/slippers when out of bed   room organization consistent   safety round/check completed  Intervention: Prevent Skin Injury  Recent Flowsheet Documentation  Taken 8/11/2024 1800 by cMkenzie Magallanes, RN  Body Position: sitting up in bed  Taken 8/11/2024 1307 by Mckenzie Magallanes RN  Body Position: position changed independently  Intervention: Prevent and Manage VTE (Venous Thromboembolism) Risk  Recent Flowsheet Documentation  Taken 8/11/2024 1307 by Mckenzie Magallanes, RN  Activity Management: activity encouraged  VTE Prevention/Management: (Xarelto) other (see comments)  Range of Motion: active ROM (range of motion) encouraged  Intervention: Prevent Infection  Recent Flowsheet Documentation  Taken 8/11/2024 1307 by Mckenzie Magallanes, RN  Infection Prevention:   rest/sleep promoted   single patient room provided  Goal: Optimal " Comfort and Wellbeing  Outcome: Ongoing, Progressing  Intervention: Provide Person-Centered Care  Recent Flowsheet Documentation  Taken 8/11/2024 1307 by Mckenzie Magallanes RN  Trust Relationship/Rapport:   care explained   thoughts/feelings acknowledged   reassurance provided   questions encouraged   questions answered   emotional support provided  Goal: Readiness for Transition of Care  Outcome: Ongoing, Progressing     Problem: Hypertension Comorbidity  Goal: Blood Pressure in Desired Range  Outcome: Ongoing, Progressing  Intervention: Maintain Blood Pressure Management  Recent Flowsheet Documentation  Taken 8/11/2024 1307 by Mckenzie Magallanes RN  Medication Review/Management: medications reviewed     Problem: Fall Injury Risk  Goal: Absence of Fall and Fall-Related Injury  Outcome: Ongoing, Progressing  Intervention: Identify and Manage Contributors  Recent Flowsheet Documentation  Taken 8/11/2024 1307 by Mckenzie Magallanes RN  Medication Review/Management: medications reviewed  Self-Care Promotion:   independence encouraged   BADL personal objects within reach   BADL personal routines maintained  Intervention: Promote Injury-Free Environment  Recent Flowsheet Documentation  Taken 8/11/2024 1307 by Mckenzie Magallanes RN  Safety Promotion/Fall Prevention:   activity supervised   assistive device/personal items within reach   clutter free environment maintained   fall prevention program maintained   nonskid shoes/slippers when out of bed   room organization consistent   safety round/check completed     Problem: Pain Acute  Goal: Acceptable Pain Control and Functional Ability  Outcome: Ongoing, Progressing  Intervention: Prevent or Manage Pain  Recent Flowsheet Documentation  Taken 8/11/2024 1307 by Mckenzie Magallanes RN  Medication Review/Management: medications reviewed  Intervention: Optimize Psychosocial Wellbeing  Recent Flowsheet Documentation  Taken 8/11/2024 1307 by Mckenzie Magallanes RN  Diversional Activities:    television   smartphone     Problem: Alcohol Withdrawal  Goal: Alcohol Withdrawal Symptom Control  Outcome: Ongoing, Progressing     Problem: Acute Neurologic Deterioration (Alcohol Withdrawal)  Goal: Optimal Neurologic Function  Outcome: Ongoing, Progressing     Problem: Substance Misuse (Alcohol Withdrawal)  Goal: Readiness for Change Identified  Outcome: Ongoing, Progressing  Intervention: Promote Psychosocial Wellbeing  Recent Flowsheet Documentation  Taken 8/11/2024 1307 by Mckenzie Magallanes, RN  Family/Support System Care:   self-care encouraged   presence promoted   Goal Outcome Evaluation:

## 2024-08-11 NOTE — CONSULTS
Date of Hospital Visit: 8/10/2024  Date of consult: 2024  Encounter Provider: Heraclio Jiménez MD  Place of Service: [facility] CARDIOLOGY  Patient Name: Javi Doran  :1960  Referral Provider: Tai Rodriguez DO    Chief complaint: Shortness of breath and leg swelling    Reason for consult: CHF    History of Present Illness  Mr. Doran is a 64 years old gentleman with history of nonischemic cardiomyopathy/CHF, hypertrophic cardiomyopathy/myomectomy with maze and mitral valve repair, A-fib ablation, CAD and multiple coronary stents, alcohol and tobacco use, multiple hospital admissions usually seen at [place].  He was just released from hospital for treatment of alcohol complications recently.  He came into the ER with complaints of worsening shortness of breath especially with exertion along with exertional dizziness, extremity swelling.  He reports being compliant with medications but he is on medications conflict with recent discharge medications.  He continues to consume alcohol.    Past Medical History:   Diagnosis Date    Alcoholism     Atrial fibrillation     Constipation     Depression     Depression with anxiety     Obstructive hypertrophic cardiomyopathy     Persistent insomnia     Solitary pulmonary nodule on lung CT        Past Surgical History:   Procedure Laterality Date    ADENOIDECTOMY      CARDIAC SURGERY      HEMORRHOIDECTOMY      OTHER SURGICAL HISTORY      PTCA: DIAGNOSTIC CATH CORONARY DOMINANCE    OTHER SURGICAL HISTORY      TONSILLECTOMY WITH ADENOIDECTOMY    TONSILLECTOMY         Facility-Administered Medications Prior to Admission   Medication Dose Route Frequency Provider Last Rate Last Admin    [DISCONTINUED] cyanocobalamin injection 1,000 mcg  1,000 mcg Intramuscular Q30 Days Duke Razo DO   1,000 mcg at 16 0949     Medications Prior to Admission   Medication Sig Dispense Refill Last Dose    amiodarone (PACERONE) 200 MG tablet  Take 1 tablet by mouth Daily.   8/9/2024    atorvastatin (LIPITOR) 80 MG tablet Take 1 tablet by mouth Every Night.   8/9/2024    furosemide (LASIX) 20 MG tablet Take 2 tablets by mouth 2 (Two) Times a Day.   8/9/2024    losartan (COZAAR) 25 MG tablet Take 1 tablet by mouth Daily.   Past Month    metoprolol tartrate (LOPRESSOR) 25 MG tablet Take 1 tablet by mouth 2 (Two) Times a Day.   8/9/2024    rivaroxaban (XARELTO) 15 MG tablet Take 1 tablet by mouth Daily With Dinner.   Past Week    sertraline (ZOLOFT) 100 MG tablet Take 1 tablet by mouth Every Night.   Past Week    spironolactone (ALDACTONE) 25 MG tablet Take 1 tablet by mouth Daily.   Past Month    albuterol sulfate  (90 Base) MCG/ACT inhaler Inhale 2 puffs Every 4 (Four) Hours As Needed for Wheezing.   Unknown    busPIRone (BUSPAR) 15 MG tablet Take 1 tablet by mouth 2 (Two) Times a Day As Needed (anxiety). Has not used in the past 6 months but has if needed   More than a month    potassium chloride 10 MEQ CR tablet Take 2 tablets by mouth 2 (Two) Times a Day. Has not filled in 3 months and has been using OTC products, however has not been consistently taking these.   More than a month    Umeclidinium-Vilanterol (Anoro Ellipta) 62.5-25 MCG/ACT aerosol powder  inhaler Inhale 1 puff Daily.   More than a month       Current Meds  Scheduled Meds:amiodarone, 200 mg, Oral, Daily  arformoterol, 15 mcg, Nebulization, BID - RT   And  tiotropium bromide monohydrate, 2 puff, Inhalation, Daily - RT  atorvastatin, 80 mg, Oral, Nightly  metoprolol tartrate, 25 mg, Oral, BID  potassium chloride, 40 mEq, Oral, Q4H - RT  rivaroxaban, 15 mg, Oral, Daily With Dinner  sertraline, 100 mg, Oral, Nightly  sodium chloride, 10 mL, Intravenous, Q12H  spironolactone, 25 mg, Oral, BID  thiamine, 100 mg, Oral, Daily      Continuous Infusions:   PRN Meds:.  acetaminophen **OR** acetaminophen **OR** acetaminophen    albuterol    senna-docusate sodium **AND** polyethylene glycol  "**AND** bisacodyl **AND** bisacodyl    busPIRone    Calcium Replacement - Follow Nurse / BPA Driven Protocol    Magnesium Standard Dose Replacement - Follow Nurse / BPA Driven Protocol    nitroglycerin    ondansetron ODT **OR** ondansetron    Phosphorus Replacement - Follow Nurse / BPA Driven Protocol    sodium chloride    sodium chloride    Allergies as of 08/10/2024    (No Known Allergies)       Social History     Socioeconomic History    Marital status: Single   Tobacco Use    Smoking status: Every Day     Current packs/day: 1.00     Types: Cigarettes   Vaping Use    Vaping status: Never Used   Substance and Sexual Activity    Alcohol use: Yes     Comment: former alcoholic. drinks occassionally.    Drug use: No    Sexual activity: Defer       Family History   Problem Relation Age of Onset    Cardiomyopathy Brother        REVIEW OF SYSTEMS:   All systems reviewed and pertinent positives include in HPI otherwise negative review of systems.       Objective:   Temp:  [97.4 °F (36.3 °C)-98.6 °F (37 °C)] 97.5 °F (36.4 °C)  Heart Rate:  [50-69] 58  Resp:  [16-18] 16  BP: ()/(35-77) 101/67  Body mass index is 22.84 kg/m².  Flowsheet Rows      Flowsheet Row First Filed Value   Admission Height 182.9 cm (72\") Documented at 08/10/2024 1500   Admission Weight 76.4 kg (168 lb 6.9 oz) Documented at 08/10/2024 1436          Vitals:    08/11/24 0826   BP:    Pulse: 58   Resp:    Temp:    SpO2: 100%       General Appearance:    Alert, cooperative, in no acute distress   Head:    Normocephalic, without obvious abnormality, atraumatic   Eyes:            Lids and lashes normal, conjunctivae and sclerae normal, no   icterus, no pallor, corneas clear, PERRLA   Ears:    Ears appear intact with no abnormalities noted   Throat:   No oral lesions, no thrush, oral mucosa moist   Neck:   No adenopathy, supple, trachea midline, no thyromegaly, no   carotid bruit, no JVD   Back:     No kyphosis present, no scoliosis present, no skin " lesions, erythema or scars, no tenderness to percussion or palpation, range of motion normal   Lungs:     Clear to auscultation,respirations regular, even and unlabored    Heart:    Regular rhythm and normal rate, normal S1 and S2, no murmur, no gallop, no rub, no click   Chest Wall:    No abnormalities observed   Abdomen:     Normal bowel sounds, no masses, no organomegaly, soft nontender, nondistended, no guarding, no rebound  tenderness   Extremities:   Moves all extremities well, no edema, no cyanosis, no redness   Pulses:   Pulses palpable and equal bilaterally. Normal radial, carotid, femoral, dorsalis pedis and posterior tibial pulses bilaterally. Normal abdominal aorta   Skin:  Neurology:   Psychiatric:   No bleeding, bruising or rash   Normal speech and cranial nerve exam, no focal deficit   Alert and oriented x 3, normal mood and affect             Review of Data:      Results from last 7 days   Lab Units 08/11/24  0239   SODIUM mmol/L 136   POTASSIUM mmol/L 2.1*   CHLORIDE mmol/L 93*   CO2 mmol/L 27.0   BUN mg/dL 29*   CREATININE mg/dL 1.89*   CALCIUM mg/dL 8.2*   BILIRUBIN mg/dL 2.7*   ALK PHOS U/L 215*   ALT (SGPT) U/L 36   AST (SGOT) U/L 73*   GLUCOSE mg/dL 85     Results from last 7 days   Lab Units 08/10/24  1527 08/10/24  1141   HSTROP T ng/L 40* 50*     @LABRCNTbnp@  Results from last 7 days   Lab Units 08/11/24  0239 08/10/24  1141   WBC 10*3/mm3 9.05 8.70   HEMOGLOBIN g/dL 8.8* 9.6*   HEMATOCRIT % 27.8* 30.2*   PLATELETS 10*3/mm3 226 222     Results from last 7 days   Lab Units 08/10/24  1141   INR  3.30*   APTT seconds 46.6*     Results from last 7 days   Lab Units 08/11/24  0239   MAGNESIUM mg/dL 2.3     @LABRCNTIP(chol,trig,hdl,ldl)    I personally viewed and interpreted the patient's EKG/Telemetry data  )   Hypokalemia    EDMOND (acute kidney injury)        Assessment and Plan:  Mr. Doran is a 63 yo gentleman with complex cardiac history, medication noncompliance, alcohol and tobacco abuse and  recently released from [place] with similar complaints.  He is admitted now with CHF exacerbation.  In the ER noted to have significant hypokalemia, elevated proBNP, chest x-ray with mild vascular congestion.  So far he received electrolyte repletion but no diuretic.  Acute on chronic systolic congestive heart failure-echo in July 2024 reported left ventricular ejection fraction of 34% with dyskinetic segments.  No significant valvular abnormality reported.  History of hypertrophic cardiomyopathy/myomectomy/maze procedure, A-fib ablation/mitral valve repair  History of coronary artery disease and multiple stents  Chronic alcohol abuse  Severe hypokalemia-likely combination of home diuretic use and alcohol abuse  Chronic kidney disease   discrepancy in home medication and provider list.  Home medications include amiodarone, atorvastatin, Lasix, losartan, Toprol tartrate, Xarelto, spironolactone    Not overtly volume overloaded during exam today.  But still has exertional shortness of breath.  Diuretic deferred pending correction of potassium levels.  His blood pressure is borderline low.  Agree to continue spironolactone but I have switched metoprolol to tartrate to succinate.  Recommend daily weight, strict I's and O's  Will reevaluate patient tomorrow    Heraclio Jiménez MD  08/11/24  12:16 EDT.      Time spent in reviewing chart, discussion and examination:

## 2024-08-11 NOTE — PROGRESS NOTES
Name: Javi Doran ADMIT: 8/10/2024   : 1960  PCP: Ginette Bryant MD    MRN: [id number] LOS: 0 days   AGE/SEX: 64 y.o. male  ROOM: [facility]     Subjective   Subjective   No new complaints this morning.   Apparently became more anxious this afternoon       Objective   Objective   Vital Signs  Temp:  [97.4 °F (36.3 °C)-97.5 °F (36.4 °C)] 97.5 °F (36.4 °C)  Heart Rate:  [50-58] 57  Resp:  [16-18] 16  BP: ()/(65-80) 111/80  SpO2:  [93 %-100 %] 98 %  on  Flow (L/min):  [2] 2;   Device (Oxygen Therapy): nasal cannula  Body mass index is 22.84 kg/m².  Physical Exam  Vitals reviewed.   Constitutional:       General: He is not in acute distress.     Appearance: He is well-developed.   HENT:      Head: Normocephalic and atraumatic.   Eyes:      General: No scleral icterus.  Neck:      Vascular: No JVD.   Cardiovascular:      Rate and Rhythm: Normal rate and regular rhythm.      Heart sounds: No murmur heard.  Pulmonary:      Effort: Pulmonary effort is normal. No respiratory distress.      Breath sounds: Normal breath sounds. No wheezing.   Abdominal:      General: There is distension.      Palpations: Abdomen is soft.      Tenderness: There is no abdominal tenderness.   Musculoskeletal:      Right lower leg: No edema.      Left lower leg: No edema.   Skin:     General: Skin is warm and dry.      Coloration: Skin is pale.      Findings: No rash.   Neurological:      Mental Status: He is alert and oriented to person, place, and time.       Results Review     I reviewed the patient's new clinical results.  Results from last 7 days   Lab Units 24  0239 08/10/24  1141   WBC 10*3/mm3 9.05 8.70   HEMOGLOBIN g/dL 8.8* 9.6*   PLATELETS 10*3/mm3 226 222     Results from last 7 days   Lab Units 24  0239 08/10/24  2228 08/10/24  1141   SODIUM mmol/L 136 134* 134*   POTASSIUM mmol/L 2.1* 2.2* 1.8*   CHLORIDE mmol/L 93* 91* 89*   CO2 mmol/L 27.0 27.0 30.0*   BUN mg/dL 29* 26* 21  "  CREATININE mg/dL 1.89* 1.74* 1.59*   GLUCOSE mg/dL 85 141* 132*   EGFR mL/min/1.73 39.4* 43.5* 48.5*     Results from last 7 days   Lab Units 08/11/24  0239 08/10/24  1141   ALBUMIN g/dL 3.6 4.1   BILIRUBIN mg/dL 2.7* 2.6*   ALK PHOS U/L 215* 219*   AST (SGOT) U/L 73* 77*   ALT (SGPT) U/L 36 37     Results from last 7 days   Lab Units 08/11/24  0239 08/10/24  2228 08/10/24  1141   CALCIUM mg/dL 8.2* 8.0* 8.6   ALBUMIN g/dL 3.6  --  4.1   MAGNESIUM mg/dL 2.3  --  2.0   PHOSPHORUS mg/dL 4.0  --  3.0       No results found for: \"HGBA1C\", \"POCGLU\"    XR Chest 1 View    Result Date: 8/10/2024  1. Patient is had a previous median sternotomy and has enlarged cardiomediastinal silhouette and mild central pulmonary vascular engorgement. There is very minimal interstitial prominence in lungs that may be chronic although difficult exclude minimal interstitial edema and correlate clinically. There is the suggestion of a tiny right effusion minimal blunting the right lateral costophrenic angle. No additional active disease is seen in the chest.  2. There are old healed fracture of the left seventh through ninth lateral ribs.  This report was finalized on 8/10/2024 12:28 PM by Dr. Duke Langston M.D on Workstation: [id number]       I have personally reviewed all medications:  Scheduled Medications  amiodarone, 200 mg, Oral, Daily  arformoterol, 15 mcg, Nebulization, BID - RT   And  tiotropium bromide monohydrate, 2 puff, Inhalation, Daily - RT  atorvastatin, 80 mg, Oral, Nightly  folic acid, 1 mg, Oral, Daily  [START ON 8/12/2024] metoprolol succinate XL, 25 mg, Oral, Q24H  multivitamin with minerals, 1 tablet, Oral, Daily  rivaroxaban, 15 mg, Oral, Daily With Dinner  sertraline, 100 mg, Oral, Nightly  sodium chloride, 10 mL, Intravenous, Q12H  spironolactone, 25 mg, Oral, BID  thiamine (B-1) IV, 200 mg, Intravenous, Q8H   Followed by  [START ON 8/17/2024] thiamine, 100 mg, Oral, Daily    Infusions   Diet  Diet: Regular/House, " Cardiac; Healthy Heart (2-3 Na+); Fluid Consistency: Thin (IDDSI 0)    I have personally reviewed:  [x]  Laboratory   []  Microbiology   [x]  Radiology   []  EKG/Telemetry  [x]  Cardiology/Vascular   []  Pathology    []  Records       Assessment/Plan     Active Hospital Problems    Diagnosis  POA    **Hypokalemia [E87.6]  Yes    EDMOND (acute kidney injury) [N17.9]  Yes    Alcohol withdrawal syndrome without complication [F10.930]  Yes    Essential hypertension [I10]  Yes    Anemia [D64.9]  Yes    Acute on chronic systolic congestive heart failure [I50.23]  Yes    Anxiety [F41.9]  Yes      Resolved Hospital Problems   No resolved problems to display.       64 y.o. male with history of alcohol abuse and NICM admitted with dyspnea, EDMOND and Hypokalemia.    Severe hypokalemia- Did not respond much to replacement. Mag level wnl  - Cont replacement though would prefer not to use the protocol with borderline renal fxn    Dyspnea likely due to CHF though not severely overloaded currently.  - Diuresis per nephrology. Renal function not at baseline    ETOH abuse- CIWA scores increasing, likely needs lorazepam protocol. Increase thiamine and add folate and MVI  - LFT near his usual baseline    Anemia- No overt bleeding though obviously some risk given ETOH use. Will add PPI and monitor daily  - Hold AC if any overt bleeding    PAF s/p ablation- Toprol XL/Xarelto      SCD/Xarelto  Dispo: TBD. Likely will need 2-3 more days, will convert to IP status      Reggie Minor MD  [facility]  08/11/24  18:29 EDT

## 2024-08-11 NOTE — THERAPY EVALUATION
Patient Name: Javi Doran  : 1960    MRN: [id number]                              Today's Date: 2024       Admit Date: 8/10/2024    Visit Dx:     ICD-10-CM ICD-9-CM   1. Hypokalemia  E87.6 276.8   2. EDMOND (acute kidney injury)  N17.9 584.9   3. Lower extremity edema  R60.0 782.3   4. Anemia, unspecified type  D64.9 285.9   5. Elevated troponin  R79.89 790.6     Patient Active Problem List   Diagnosis    Elevated alanine aminotransferase (ALT) level    Anemia    Anxiety    Coronary arteriosclerosis in native artery    Cobalamin deficiency    Mass of breast    Chronic kidney disease    Congestive heart failure    Constipation    Gastroesophageal reflux disease    Fatigue    Gastric ulcer    Gynecomastia    History of alcohol abuse    Hyperlipidemia    Hypogonadism in male    Major depressive disorder, recurrent episode    Primary insomnia    Temporary cerebral vascular dysfunction    Essential hypertension    Benzodiazepine misuse    Hypokalemia    EDMOND (acute kidney injury)     Past Medical History:   Diagnosis Date    Alcoholism     Atrial fibrillation     Constipation     Depression     Depression with anxiety     Obstructive hypertrophic cardiomyopathy     Persistent insomnia     Solitary pulmonary nodule on lung CT      Past Surgical History:   Procedure Laterality Date    ADENOIDECTOMY      CARDIAC SURGERY      HEMORRHOIDECTOMY      OTHER SURGICAL HISTORY      PTCA: DIAGNOSTIC CATH CORONARY DOMINANCE    OTHER SURGICAL HISTORY      TONSILLECTOMY WITH ADENOIDECTOMY    TONSILLECTOMY        General Information       Row Name 24 1155          Physical Therapy Time and Intention    Document Type evaluation  -DJ     Mode of Treatment individual therapy;physical therapy  -DJ       Row Name 24 1155          General Information    Patient Profile Reviewed yes  -DJ     Prior Level of Function independent:;ADL's;all household mobility  -DJ     Existing Precautions/Restrictions fall  -DJ      Barriers to Rehab medically complex;previous functional deficit;cognitive status  -DJ       Row Name 08/11/24 1155          Living Environment    People in Home alone  -DJ       Row Name 08/11/24 1155          Home Main Entrance    Number of Stairs, Main Entrance two  -DJ       Row Name 08/11/24 1155          Stairs Within Home, Primary    Number of Stairs, Within Home, Primary none  -DJ       Row Name 08/11/24 1155          Cognition    Orientation Status (Cognition) oriented x 3  -DJ       Row Name 08/11/24 1155          Safety Issues, Functional Mobility    Safety Issues Affecting Function (Mobility) judgment;safety precaution awareness  -DJ     Impairments Affecting Function (Mobility) balance;endurance/activity tolerance;strength;shortness of breath  -DJ     Comment, Safety Issues/Impairments (Mobility) gt belt, nonskid socks  -DJ               User Key  (r) = Recorded By, (t) = Taken By, (c) = Cosigned By      Initials Name Provider Type    Sierra Goff, PT Physical Therapist                   Mobility       Row Name 08/11/24 1156          Bed Mobility    Bed Mobility supine-sit;sit-supine  -DJ     Supine-Sit [place] (Bed Mobility) standby assist;verbal cues  -DJ     Sit-Supine [place] (Bed Mobility) standby assist;verbal cues  -DJ     Assistive Device (Bed Mobility) head of bed elevated  -DJ       Row Name 08/11/24 1156          Transfers    Comment, (Transfers) sit/stand from EOB x 2 attempts  -DJ       Row Name 08/11/24 1156          Bed-Chair Transfer    Bed-Chair [place] (Transfers) not tested  -DJ       Row Name 08/11/24 1156          Sit-Stand Transfer    Sit-Stand [place] (Transfers) contact guard;verbal cues  -DJ     Assistive Device (Sit-Stand Transfers) walker, front-wheeled  -DJ       Row Name 08/11/24 1156          Gait/Stairs (Locomotion)    [place] Level (Gait) contact guard;minimum assist (75% patient effort);verbal cues  -DJ     Assistive Device (Gait) walker,  front-wheeled  -DJ     Distance in Feet (Gait) 20  20' x 2  -DJ     Deviations/Abnormal Patterns (Gait) gait speed decreased;stride length decreased;festinating/shuffling  -DJ     [place] Level (Stairs) not tested  -DJ     Comment, (Gait/Stairs) Pt amb 20' with 1 turn with r wx and CGA - min A; unsteady balance and c/o SOB. Pt returned to EOB and then requested to use bathroom she he amb 20' again with r wx and CGA - min A. O2 100% after amb but c/o SOB and fatigued.  -DJ               User Key  (r) = Recorded By, (t) = Taken By, (c) = Cosigned By      Initials Name Provider Type    Sierra Goff PT Physical Therapist                   Obj/Interventions       Row Name 08/11/24 1200          Range of Motion Comprehensive    Comment, General Range of Motion grossly WFL  -DJ       Row Name 08/11/24 1200          Strength Comprehensive (MMT)    Comment, General Manual Muscle Testing (MMT) Assessment fair extremity strength  -DJ       Row Name 08/11/24 1200          Motor Skills    Motor Skills functional endurance  -DJ     Functional Endurance poor - fatigeus quickly  -DJ       Row Name 08/11/24 1200          Balance    Balance Assessment standing static balance;standing dynamic balance  -DJ     Static Standing Balance contact guard;verbal cues  -DJ     Dynamic Standing Balance minimal assist;verbal cues  -DJ     Position/Device Used, Standing Balance walker, front-wheeled;supported  -DJ     Balance Interventions sitting;standing;sit to stand;supported;weight shifting activity  -DJ     Comment, Balance unsteady  -DJ       Row Name 08/11/24 1200          Sensory Assessment (Somatosensory)    Sensory Assessment (Somatosensory) not tested  -DJ               User Key  (r) = Recorded By, (t) = Taken By, (c) = Cosigned By      Initials Name Provider Type    Sierra Goff PT Physical Therapist                   Goals/Plan       Row Name 08/11/24 1207          Transfer Goal 1 (PT)    Activity/Assistive Device  "(Transfer Goal 1, PT) sit-to-stand/stand-to-sit;walker, rolling  -DJ     [place] Level/Cues Needed (Transfer Goal 1, PT) verbal cues required;standby assist  -DJ     Time Frame (Transfer Goal 1, PT) 10 days  -DJ       Row Name 08/11/24 1207          Gait Training Goal 1 (PT)    Activity/Assistive Device (Gait Training Goal 1, PT) gait (walking locomotion);assistive device use;improve balance and speed;increase endurance/gait distance;increase energy conservation;walker, rolling  -DJ     [place] Level (Gait Training Goal 1, PT) standby assist;verbal cues required  -DJ     Distance (Gait Training Goal 1, PT) >150'  -DJ     Time Frame (Gait Training Goal 1, PT) 10 days  -DJ       Row Name 08/11/24 1207          Stairs Goal 1 (PT)    Activity/Assistive Device (Stairs Goal 1, PT) ascending stairs;descending stairs  -DJ     [place] Level/Cues Needed (Stairs Goal 1, PT) contact guard required;verbal cues required  -DJ     Number of Stairs (Stairs Goal 1, PT) 2  -DJ     Time Frame (Stairs Goal 1, PT) 10 days  -DJ       Row Name 08/11/24 1207          Patient Education Goal (PT)    Activity (Patient Education Goal, PT) HEP  -DJ     [place]/Cues/Accuracy (Memory Goal 2, PT) demonstrates adequately;verbalizes understanding  -DJ     Time Frame (Patient Education Goal, PT) 5 days;short term goal (STG)  -DJ       Row Name 08/11/24 1207          Therapy Assessment/Plan (PT)    Planned Therapy Interventions (PT) balance training;bed mobility training;gait training;home exercise program;transfer training;strengthening;stair training;postural re-education;patient/family education  -DJ               User Key  (r) = Recorded By, (t) = Taken By, (c) = Cosigned By      Initials Name Provider Type    Sierra Goff, PT Physical Therapist                   Clinical Impression       Row Name 08/11/24 1202          Pain    Pre/Posttreatment Pain Comment c/c = \"I cant breathe\"  -DJ     Pain Intervention(s) " Repositioned;Rest  -DJ       Row Name 08/11/24 1202          Plan of Care Review    Plan of Care Reviewed With patient  -DJ     Outcome Evaluation 65yo white male admitted 8/10/24 with hypokalemia. PMH includes anemia, anxiety, CKD, heart failure, etoh and benzo use, depression, TIA, hbp, mv repair, afib ablation. PLOF: Pt lives at home alone with 2 MAKI and amb without AD and was independent with ADLs. He is limited now by generalized weakness and decreased activity tolerance. Today, he was found resting in bed. He initially said that he could not get up because he could not breathe, but he eventually agreed to participate in therapy. He sat EOB with SBA/vc. He stood from EOB x 2 attempts with CGA using r wx. Pt amb 20' with 1 turn with r wx and CGA - min A; unsteady balance and c/o SOB. Pt returned to EOB and then requested to use bathroom she he amb 20' again with r wx and CGA - min A. O2 100% after amb but c/o SOB and fatigued. Pt returned supine in bed with CGA. He would certainly benefit from skilled PT services to address functional deficits and prepare for d/c. Pt hopeful to returrn home upon d/c.  -DJ       Row Name 08/11/24 1202          Therapy Assessment/Plan (PT)    Patient/Family Therapy Goals Statement (PT) home  -DJ     Rehab Potential (PT) good, to achieve stated therapy goals  -DJ     Criteria for Skilled Interventions Met (PT) yes;meets criteria;skilled treatment is necessary  -DJ     Therapy Frequency (PT) 5 times/wk  -DJ       Row Name 08/11/24 1202          Vital Signs    O2 Delivery Pre Treatment room air  -DJ     Post SpO2 (%) 100  -DJ     O2 Delivery Post Treatment room air  -DJ     Pre Patient Position Supine  -DJ     Intra Patient Position Standing  -DJ     Post Patient Position Supine  -DJ       Row Name 08/11/24 1202          Positioning and Restraints    Pre-Treatment Position in bed  -DJ     Post Treatment Position bed  -DJ     In Bed notified nsg;supine;call light within  reach;encouraged to call for assist;exit alarm on  -DJ               User Key  (r) = Recorded By, (t) = Taken By, (c) = Cosigned By      Initials Name Provider Type    Sierra Goff, GISSELL Physical Therapist                   Outcome Measures       Row Name 08/11/24 1208 08/11/24 0815       How much help from another person do you currently need...    Turning from your back to your side while in flat bed without using bedrails? 4  -DJ 3  -EP    Moving from lying on back to sitting on the side of a flat bed without bedrails? 3  -DJ 3  -EP    Moving to and from a bed to a chair (including a wheelchair)? 2  -DJ 2  -EP    Standing up from a chair using your arms (e.g., wheelchair, bedside chair)? 3  -DJ 3  -EP    Climbing 3-5 steps with a railing? 2  -DJ 3  -EP    To walk in hospital room? 2  -DJ 3  -EP    AM-PAC 6 Clicks Score (PT) 16  -DJ 17  -EP    Highest Level of Mobility Goal 5 --> Static standing  -DJ 5 --> Static standing  -EP      Row Name 08/11/24 1208          Functional Assessment    Outcome Measure Options AM-PAC 6 Clicks Basic Mobility (PT)  -DJ               User Key  (r) = Recorded By, (t) = Taken By, (c) = Cosigned By      Initials Name Provider Type    Gisela Priest, RN Registered Nurse    Sierra Goff, PT Physical Therapist                                 Physical Therapy Education       Title: PT OT SLP Therapies (In Progress)       Topic: Physical Therapy (In Progress)       Point: Mobility training (Done)       Learning Progress Summary             Patient Acceptance, E, VU,NR by TARAH at 8/11/2024 1209                         Point: Home exercise program (Not Started)       Learner Progress:  Not documented in this visit.              Point: Body mechanics (Done)       Learning Progress Summary             Patient Acceptance, E, VU,NR by TARAH at 8/11/2024 1209                         Point: Precautions (Done)       Learning Progress Summary             Patient Acceptance, E, VU,NR by TARAH at  8/11/2024 1201                                         User Key       Initials Effective Dates Name Provider Type Discipline    DJ 10/25/19 -  Sierra Jamison, PT Physical Therapist PT                  PT Recommendation and Plan  Planned Therapy Interventions (PT): balance training, bed mobility training, gait training, home exercise program, transfer training, strengthening, stair training, postural re-education, patient/family education  Plan of Care Reviewed With: patient  Outcome Evaluation: 65yo white male admitted 8/10/24 with hypokalemia. PMH includes anemia, anxiety, CKD, heart failure, etoh and benzo use, depression, TIA, hbp, mv repair, afib ablation. PLOF: Pt lives at home alone with 2 MAKI and amb without AD and was independent with ADLs. He is limited now by generalized weakness and decreased activity tolerance. Today, he was found resting in bed. He initially said that he could not get up because he could not breathe, but he eventually agreed to participate in therapy. He sat EOB with SBA/vc. He stood from EOB x 2 attempts with CGA using r wx. Pt amb 20' with 1 turn with r wx and CGA - min A; unsteady balance and c/o SOB. Pt returned to EOB and then requested to use bathroom she he amb 20' again with r wx and CGA - min A. O2 100% after amb but c/o SOB and fatigued. Pt returned supine in bed with CGA. He would certainly benefit from skilled PT services to address functional deficits and prepare for d/c. Pt hopeful to returrn home upon d/c.     Time Calculation:   PT Evaluation Complexity  History, PT Evaluation Complexity: 3 or more personal factors and/or comorbidities  Examination of Body Systems (PT Eval Complexity): total of 4 or more elements  Clinical Presentation (PT Evaluation Complexity): evolving  Clinical Decision Making (PT Evaluation Complexity): moderate complexity  Overall Complexity (PT Evaluation Complexity): moderate complexity     PT Charges       Row Name 08/11/24 1210             Time  Calculation    Start Time 1038  -DJ      Stop Time 1057  -DJ      Time Calculation (min) 19 min  -DJ      PT Non-Billable Time (min) 10 min  -DJ      PT Received On 08/11/24  -DJ      PT - Next Appointment 08/12/24  -DJ      PT Goal Re-Cert Due Date 08/21/24  -DJ                User Key  (r) = Recorded By, (t) = Taken By, (c) = Cosigned By      Initials Name Provider Type    Sierra Goff, PT Physical Therapist                  Therapy Charges for Today       Code Description Service Date Service Provider Modifiers Qty    91731104528 HC PT EVAL MOD COMPLEXITY 3 8/11/2024 Sierra Jamison, PT GP 1    86772829645 HC PT THERAPEUTIC ACT EA 15 MIN 8/11/2024 Sierra Jamison, PT GP 1            PT G-Codes  Outcome Measure Options: AM-PAC 6 Clicks Basic Mobility (PT)  AM-PAC 6 Clicks Score (PT): 16  PT Discharge Summary  Anticipated Discharge Disposition (PT): home with home health, skilled nursing facility (pending progress)    Sierra Jamison PT  8/11/2024

## 2024-08-11 NOTE — PLAN OF CARE
Problem: Adult Inpatient Plan of Care  Goal: Absence of Hospital-Acquired Illness or Injury  Intervention: Identify and Manage Fall Risk  Recent Flowsheet Documentation  Taken 8/11/2024 0410 by Louann Ochoa RN  Safety Promotion/Fall Prevention: safety round/check completed  Taken 8/11/2024 0201 by Louann Ochoa RN  Safety Promotion/Fall Prevention: safety round/check completed  Taken 8/10/2024 2215 by Louann Ochoa RN  Safety Promotion/Fall Prevention: safety round/check completed  Taken 8/10/2024 2035 by Louann Ochoa RN  Safety Promotion/Fall Prevention: safety round/check completed     Problem: Adult Inpatient Plan of Care  Goal: Plan of Care Review  Outcome: Ongoing, Progressing  Flowsheets (Taken 8/11/2024 0532)  Plan of Care Reviewed With: patient  Outcome Evaluation: A/Ox4, vss, c/o rt. shoulder pain, and inability to sleep, pt was given prn medication that was effective and assisted pt with repositioning. Pt was able to go to sleep. Voiding without difficulty, ate 100 % of an evening snack all scheduled medication was given as ordered. No falls this shift. WCTM.     Problem: Adult Inpatient Plan of Care  Goal: Readiness for Transition of Care  Outcome: Ongoing, Progressing     Problem: Hypertension Comorbidity  Goal: Blood Pressure in Desired Range  Outcome: Ongoing, Progressing     Problem: Hypertension Comorbidity  Goal: Blood Pressure in Desired Range  Outcome: Ongoing, Progressing   Goal Outcome Evaluation:  Plan of Care Reviewed With: patient           Outcome Evaluation: A/Ox4, vss, c/o rt. shoulder pain, and inability to sleep, pt was given prn medication that was effective and assisted pt with repositioning. Pt was able to go to sleep. Voiding without difficulty, ate 100 % of an evening snack all scheduled medication was given as ordered. No falls this shift. WCTM.

## 2024-08-11 NOTE — PLAN OF CARE
Goal Outcome Evaluation:  Plan of Care Reviewed With: patient           Outcome Evaluation: 65yo white male admitted 8/10/24 with hypokalemia. PMH includes anemia, anxiety, CKD, heart failure, etoh and benzo use, depression, TIA, hbp, mv repair, afib ablation. PLOF: Pt lives at home alone with 2 MAKI and amb without AD and was independent with ADLs. He is limited now by generalized weakness and decreased activity tolerance. Today, he was found resting in bed. He initially said that he could not get up because he could not breathe, but he eventually agreed to participate in therapy. He sat EOB with SBA/vc. He stood from EOB x 2 attempts with CGA using r wx. Pt amb 20' with 1 turn with r wx and CGA - min A; unsteady balance and c/o SOB. Pt returned to EOB and then requested to use bathroom she he amb 20' again with r wx and CGA - min A. O2 100% after amb but c/o SOB and fatigued. Pt returned supine in bed with CGA. He would certainly benefit from skilled PT services to address functional deficits and prepare for d/c. Pt hopeful to returrn home upon d/c.      Anticipated Discharge Disposition (PT): home with home health, skilled nursing facility (pending progress)

## 2024-08-11 NOTE — PROGRESS NOTES
[facility] Progress Note      Patient Name: Javi Doran  : 1960  MRN: [id number]  Primary Care Physician:  Ginette Bryant MD  Date of admission: 8/10/2024    Subjective     Interval History:   Patient lying in bed  Patient feeling better after diuretic therapy with significant improvement of his lower extremity edema  Denies any chest pain or palpitations  Tolerating oral intake    Urinating spontaneously    Urine output not properly quantified      Review of Systems:   As noted above    Objective     Vitals:   Temp:  [97.4 °F (36.3 °C)-98.6 °F (37 °C)] 97.5 °F (36.4 °C)  Heart Rate:  [50-69] 58  Resp:  [16-18] 16  BP: ()/(35-77) 101/67    Intake/Output Summary (Last 24 hours) at 2024 1310  Last data filed at 2024 0755  Gross per 24 hour   Intake 260 ml   Output 425 ml   Net -165 ml       Physical Exam:    General Appearance: alert, oriented x 3, no acute distress   Skin: warm and dry  HEENT: oral mucosa normal, nonicteric sclera  Neck: supple, no JVD  Lungs: CTA  Heart: RRR, normal S1 and S2  Abdomen: soft, nontender, nondistended  : no palpable bladder  Extremities: + Bilateral edema, cyanosis or clubbing  Neuro: normal speech and mental status     Scheduled Meds:     amiodarone, 200 mg, Oral, Daily  arformoterol, 15 mcg, Nebulization, BID - RT   And  tiotropium bromide monohydrate, 2 puff, Inhalation, Daily - RT  atorvastatin, 80 mg, Oral, Nightly  metoprolol tartrate, 25 mg, Oral, BID  potassium chloride, 40 mEq, Oral, Q4H - RT  rivaroxaban, 15 mg, Oral, Daily With Dinner  sertraline, 100 mg, Oral, Nightly  sodium chloride, 10 mL, Intravenous, Q12H  spironolactone, 25 mg, Oral, BID  thiamine, 100 mg, Oral, Daily      IV Meds:        Results Reviewed:   I have personally reviewed the results from the time of this admission to 2024 13:10 EDT     Results from last 7 days   Lab Units 24  0239 08/10/24  2228 08/10/24  1141    SODIUM mmol/L 136 134* 134*   POTASSIUM mmol/L 2.1* 2.2* 1.8*   CHLORIDE mmol/L 93* 91* 89*   CO2 mmol/L 27.0 27.0 30.0*   BUN mg/dL 29* 26* 21   CREATININE mg/dL 1.89* 1.74* 1.59*   CALCIUM mg/dL 8.2* 8.0* 8.6   BILIRUBIN mg/dL 2.7*  --  2.6*   ALK PHOS U/L 215*  --  219*   ALT (SGPT) U/L 36  --  37   AST (SGOT) U/L 73*  --  77*   GLUCOSE mg/dL 85 141* 132*       Estimated Creatinine Clearance: 43.2 mL/min (A) (by C-G formula based on SCr of 1.89 mg/dL (H)).    Results from last 7 days   Lab Units 08/11/24  0239 08/10/24  1141   MAGNESIUM mg/dL 2.3 2.0   PHOSPHORUS mg/dL 4.0 3.0             Results from last 7 days   Lab Units 08/11/24  0239 08/10/24  1141   WBC 10*3/mm3 9.05 8.70   HEMOGLOBIN g/dL 8.8* 9.6*   PLATELETS 10*3/mm3 226 222       Results from last 7 days   Lab Units 08/10/24  1141   INR  3.30*       Assessment / Plan     ASSESSMENT:    1.  EDMOND, likely prerenal from hypotension and CHF, with impaired renal autoregulation from ARB (if actually being taken).  Volume excess by exam and imaging; hypervolemic hyponatremia  2.  Severe hypokalemia, likely multifactorial:  diuretic and poor nutrition due to alcoholism.  Renal wasting a possibility.  Magnesium level normal  3.  Alcoholism.  Counseling provided  4.  Acute on chronic CHF.  proBNP over 10,000 . adjusting diuretics to improve volume status  5.  COPD with tobacco abuse  6.  Hypotension.  Currently on metoprolol.  Unable to use midodrine due to bradycardia  7.  Noncompliance, with fluid dietary restrictions and diuretics  8.  Hypervolemic hyponatremia responding to diuretic therapy following sodium trend    PLAN:  Continue KCl replacement.  Magnesium stable  Continue spironolactone to prevent renal loss and improve volume status  Will need to allow some degree of azotemia in a  attempt to achieve normovolemia  Continue surveillance labs    Thank you for involving us in the care of Javi Doran.  Please feel free to call with any  questions.    Shadi Morgan MD  08/11/24  13:10 EDT    [facility]  [phone]    Please note that portions of this note were completed with a voice recognition program.

## 2024-08-12 LAB
ALBUMIN SERPL-MCNC: 3.6 G/DL (ref 3.5–5.2)
ALBUMIN/GLOB SERPL: 1.4 G/DL
ALP SERPL-CCNC: 276 U/L (ref 39–117)
ALT SERPL W P-5'-P-CCNC: 55 U/L (ref 1–41)
ANION GAP SERPL CALCULATED.3IONS-SCNC: 17.3 MMOL/L (ref 5–15)
AST SERPL-CCNC: 111 U/L (ref 1–40)
BILIRUB SERPL-MCNC: 3.1 MG/DL (ref 0–1.2)
BUN SERPL-MCNC: 35 MG/DL (ref 8–23)
BUN/CREAT SERPL: 19.1 (ref 7–25)
CALCIUM SPEC-SCNC: 8.2 MG/DL (ref 8.6–10.5)
CHLORIDE SERPL-SCNC: 94 MMOL/L (ref 98–107)
CO2 SERPL-SCNC: 23.7 MMOL/L (ref 22–29)
CREAT SERPL-MCNC: 1.83 MG/DL (ref 0.76–1.27)
DEPRECATED RDW RBC AUTO: 61 FL (ref 37–54)
EGFRCR SERPLBLD CKD-EPI 2021: 41 ML/MIN/1.73
ERYTHROCYTE [DISTWIDTH] IN BLOOD BY AUTOMATED COUNT: 19.9 % (ref 12.3–15.4)
GLOBULIN UR ELPH-MCNC: 2.6 GM/DL
GLUCOSE SERPL-MCNC: 96 MG/DL (ref 65–99)
HCT VFR BLD AUTO: 30.1 % (ref 37.5–51)
HGB BLD-MCNC: 9.7 G/DL (ref 13–17.7)
MAGNESIUM SERPL-MCNC: 2.3 MG/DL (ref 1.6–2.4)
MCH RBC QN AUTO: 27.3 PG (ref 26.6–33)
MCHC RBC AUTO-ENTMCNC: 32.2 G/DL (ref 31.5–35.7)
MCV RBC AUTO: 84.8 FL (ref 79–97)
PHOSPHATE SERPL-MCNC: 3.4 MG/DL (ref 2.5–4.5)
PLATELET # BLD AUTO: 256 10*3/MM3 (ref 140–450)
PMV BLD AUTO: 9.8 FL (ref 6–12)
POTASSIUM SERPL-SCNC: 3.4 MMOL/L (ref 3.5–5.2)
PROT SERPL-MCNC: 6.2 G/DL (ref 6–8.5)
RBC # BLD AUTO: 3.55 10*6/MM3 (ref 4.14–5.8)
SODIUM SERPL-SCNC: 135 MMOL/L (ref 136–145)
WBC NRBC COR # BLD AUTO: 9.72 10*3/MM3 (ref 3.4–10.8)

## 2024-08-12 PROCEDURE — 97530 THERAPEUTIC ACTIVITIES: CPT

## 2024-08-12 PROCEDURE — 25010000002 THIAMINE PER 100 MG: Performed by: HOSPITALIST

## 2024-08-12 PROCEDURE — 99232 SBSQ HOSP IP/OBS MODERATE 35: CPT

## 2024-08-12 PROCEDURE — 25010000002 POTASSIUM CHLORIDE 10 MEQ/100ML SOLUTION: Performed by: HOSPITALIST

## 2024-08-12 PROCEDURE — 25010000002 LORAZEPAM PER 2 MG: Performed by: HOSPITALIST

## 2024-08-12 PROCEDURE — 83735 ASSAY OF MAGNESIUM: CPT | Performed by: HOSPITALIST

## 2024-08-12 PROCEDURE — 85027 COMPLETE CBC AUTOMATED: CPT | Performed by: HOSPITALIST

## 2024-08-12 PROCEDURE — 84100 ASSAY OF PHOSPHORUS: CPT | Performed by: HOSPITALIST

## 2024-08-12 PROCEDURE — 80053 COMPREHEN METABOLIC PANEL: CPT | Performed by: HOSPITALIST

## 2024-08-12 PROCEDURE — 97116 GAIT TRAINING THERAPY: CPT

## 2024-08-12 PROCEDURE — 94664 DEMO&/EVAL PT USE INHALER: CPT

## 2024-08-12 PROCEDURE — 94799 UNLISTED PULMONARY SVC/PX: CPT

## 2024-08-12 RX ORDER — POTASSIUM CHLORIDE 750 MG/1
40 TABLET, FILM COATED, EXTENDED RELEASE ORAL ONCE
Status: COMPLETED | OUTPATIENT
Start: 2024-08-12 | End: 2024-08-12

## 2024-08-12 RX ADMIN — POTASSIUM CHLORIDE 10 MEQ: 7.46 INJECTION, SOLUTION INTRAVENOUS at 02:30

## 2024-08-12 RX ADMIN — SERTRALINE 100 MG: 100 TABLET, FILM COATED ORAL at 20:51

## 2024-08-12 RX ADMIN — Medication 1 TABLET: at 09:36

## 2024-08-12 RX ADMIN — LORAZEPAM 2 MG: 1 TABLET ORAL at 02:31

## 2024-08-12 RX ADMIN — FOLIC ACID 1 MG: 1 TABLET ORAL at 09:36

## 2024-08-12 RX ADMIN — METOPROLOL SUCCINATE 25 MG: 25 TABLET, EXTENDED RELEASE ORAL at 09:36

## 2024-08-12 RX ADMIN — PANTOPRAZOLE SODIUM 40 MG: 40 TABLET, DELAYED RELEASE ORAL at 06:45

## 2024-08-12 RX ADMIN — POTASSIUM CHLORIDE 40 MEQ: 750 TABLET, EXTENDED RELEASE ORAL at 11:26

## 2024-08-12 RX ADMIN — POTASSIUM CHLORIDE 40 MEQ: 750 TABLET, EXTENDED RELEASE ORAL at 00:19

## 2024-08-12 RX ADMIN — RIVAROXABAN 15 MG: 15 TABLET, FILM COATED ORAL at 17:20

## 2024-08-12 RX ADMIN — ARFORMOTEROL TARTRATE 15 MCG: 15 SOLUTION RESPIRATORY (INHALATION) at 10:00

## 2024-08-12 RX ADMIN — TIOTROPIUM BROMIDE INHALATION SPRAY 2 PUFF: 3.12 SPRAY, METERED RESPIRATORY (INHALATION) at 10:00

## 2024-08-12 RX ADMIN — SPIRONOLACTONE 25 MG: 25 TABLET, FILM COATED ORAL at 09:36

## 2024-08-12 RX ADMIN — LORAZEPAM 1 MG: 1 TABLET ORAL at 20:48

## 2024-08-12 RX ADMIN — POTASSIUM CHLORIDE 40 MEQ: 750 TABLET, EXTENDED RELEASE ORAL at 03:40

## 2024-08-12 RX ADMIN — THIAMINE HYDROCHLORIDE 200 MG: 100 INJECTION, SOLUTION INTRAMUSCULAR; INTRAVENOUS at 15:07

## 2024-08-12 RX ADMIN — POTASSIUM CHLORIDE 10 MEQ: 7.46 INJECTION, SOLUTION INTRAVENOUS at 03:38

## 2024-08-12 RX ADMIN — THIAMINE HYDROCHLORIDE 200 MG: 100 INJECTION, SOLUTION INTRAMUSCULAR; INTRAVENOUS at 22:25

## 2024-08-12 RX ADMIN — LORAZEPAM 1 MG: 1 TABLET ORAL at 01:34

## 2024-08-12 RX ADMIN — LORAZEPAM 2 MG: 2 INJECTION INTRAMUSCULAR; INTRAVENOUS at 03:46

## 2024-08-12 RX ADMIN — PANTOPRAZOLE SODIUM 40 MG: 40 TABLET, DELAYED RELEASE ORAL at 17:19

## 2024-08-12 RX ADMIN — Medication 10 ML: at 20:57

## 2024-08-12 RX ADMIN — Medication 10 ML: at 09:37

## 2024-08-12 RX ADMIN — THIAMINE HYDROCHLORIDE 200 MG: 100 INJECTION, SOLUTION INTRAMUSCULAR; INTRAVENOUS at 06:47

## 2024-08-12 RX ADMIN — BUSPIRONE HYDROCHLORIDE 15 MG: 15 TABLET ORAL at 20:48

## 2024-08-12 RX ADMIN — POTASSIUM CHLORIDE 10 MEQ: 7.46 INJECTION, SOLUTION INTRAVENOUS at 00:15

## 2024-08-12 RX ADMIN — POTASSIUM CHLORIDE 10 MEQ: 7.46 INJECTION, SOLUTION INTRAVENOUS at 01:27

## 2024-08-12 RX ADMIN — SPIRONOLACTONE 25 MG: 25 TABLET, FILM COATED ORAL at 20:51

## 2024-08-12 RX ADMIN — AMIODARONE HYDROCHLORIDE 200 MG: 200 TABLET ORAL at 09:37

## 2024-08-12 NOTE — PROGRESS NOTES
"   LOS: 1 day    Patient Care Team:  Ginette Bryant MD as PCP - General (Family Medicine)    Chief Complaint:    Chief Complaint   Patient presents with    Leg Swelling    Edema     Follow UP EDMOND  Subjective     Interval History:   Awakens easily.  Conversant and pleasant.  Review of Systems:   Denies soa no cp no nausea.    Objective     Vital Signs  Temp:  [96.8 °F (36 °C)-98.1 °F (36.7 °C)] 98.1 °F (36.7 °C)  Heart Rate:  [52-64] 63  Resp:  [16-18] 16  BP: ()/(72-80) 112/73    Flowsheet Rows      Flowsheet Row First Filed Value   Admission Height 182.9 cm (72\") Documented at 08/10/2024 1500   Admission Weight 76.4 kg (168 lb 6.9 oz) Documented at 08/10/2024 1436            No intake/output data recorded.  I/O last 3 completed shifts:  In: 1268 [P.O.:1268]  Out: 875 [Urine:875]    Intake/Output Summary (Last 24 hours) at 8/12/2024 1034  Last data filed at 8/12/2024 0648  Gross per 24 hour   Intake 1058 ml   Output 600 ml   Net 458 ml       Physical Exam:  General Appearance: alert, oriented x 3, no acute distress, sleepy.  Skin: warm and dry  HEENT: pupils round and reactive to light, oral mucosa normal, nonicteric sclera  Neck: supple, no JVD, trachea midline  Lungs: Diminished, unlabored breathing effort  Heart: RRR, normal S1 and S2, no S3, no rub  Abdomen: soft, nontender, protuberant, normoactive bowels  : no palpable bladder,  Extremities: 1+ edema. No cyanosis or clubbing  Neuro: normal speech and mental status       Results Review:    Results from last 7 days   Lab Units 08/12/24  0619 08/11/24 2128 08/11/24  0239 08/10/24  2228 08/10/24  1141   SODIUM mmol/L 135*  --  136 134* 134*   POTASSIUM mmol/L 3.4* 2.5* 2.1* 2.2* 1.8*   CHLORIDE mmol/L 94*  --  93* 91* 89*   CO2 mmol/L 23.7  --  27.0 27.0 30.0*   BUN mg/dL 35*  --  29* 26* 21   CREATININE mg/dL 1.83*  --  1.89* 1.74* 1.59*   CALCIUM mg/dL 8.2*  --  8.2* 8.0* 8.6   BILIRUBIN mg/dL 3.1*  --  2.7*  --  2.6*   ALK PHOS " U/L 276*  --  215*  --  219*   ALT (SGPT) U/L 55*  --  36  --  37   AST (SGOT) U/L 111*  --  73*  --  77*   GLUCOSE mg/dL 96  --  85 141* 132*       Estimated Creatinine Clearance: 45.4 mL/min (A) (by C-G formula based on SCr of 1.83 mg/dL (H)).    Results from last 7 days   Lab Units 08/12/24  0619 08/11/24  0239 08/10/24  1141   MAGNESIUM mg/dL 2.3 2.3 2.0   PHOSPHORUS mg/dL 3.4 4.0 3.0             Results from last 7 days   Lab Units 08/12/24  0619 08/11/24  0239 08/10/24  1141   WBC 10*3/mm3 9.72 9.05 8.70   HEMOGLOBIN g/dL 9.7* 8.8* 9.6*   PLATELETS 10*3/mm3 256 226 222       Results from last 7 days   Lab Units 08/10/24  1141   INR  3.30*         Imaging Results (Last 24 Hours)       ** No results found for the last 24 hours. **          amiodarone, 200 mg, Oral, Daily  arformoterol, 15 mcg, Nebulization, BID - RT   And  tiotropium bromide monohydrate, 2 puff, Inhalation, Daily - RT  [Held by provider] atorvastatin, 80 mg, Oral, Nightly  folic acid, 1 mg, Oral, Daily  metoprolol succinate XL, 25 mg, Oral, Q24H  multivitamin with minerals, 1 tablet, Oral, Daily  pantoprazole, 40 mg, Oral, BID AC  rivaroxaban, 15 mg, Oral, Daily With Dinner  sertraline, 100 mg, Oral, Nightly  sodium chloride, 10 mL, Intravenous, Q12H  spironolactone, 25 mg, Oral, BID  thiamine (B-1) IV, 200 mg, Intravenous, Q8H   Followed by  [START ON 8/17/2024] thiamine, 100 mg, Oral, Daily           Medication Review:   Current Facility-Administered Medications   Medication Dose Route Frequency Provider Last Rate Last Admin    acetaminophen (TYLENOL) tablet 650 mg  650 mg Oral Q4H PRN Mp Sethi MD   650 mg at 08/11/24 1802    Or    acetaminophen (TYLENOL) 160 MG/5ML oral solution 650 mg  650 mg Oral Q4H PRN Mp Sethi MD        Or    acetaminophen (TYLENOL) suppository 650 mg  650 mg Rectal Q4H PRN Mp Sethi MD        albuterol (PROVENTIL) nebulizer solution 0.083% 2.5 mg/3mL  2.5 mg Nebulization Q6H PRN  Mp Sethi MD   2.5 mg at 08/11/24 0009    amiodarone (PACERONE) tablet 200 mg  200 mg Oral Daily Mp Sethi MD   200 mg at 08/12/24 0937    arformoterol (BROVANA) nebulizer solution 15 mcg  15 mcg Nebulization BID - RT Mp Sethi MD   15 mcg at 08/12/24 1000    And    tiotropium (SPIRIVA RESPIMAT) 2.5 mcg/act aerosol solution inhaler  2 puff Inhalation Daily - RT Mp Sethi MD   2 puff at 08/12/24 1000    [Held by provider] atorvastatin (LIPITOR) tablet 80 mg  80 mg Oral Nightly Mp Sethi MD   80 mg at 08/11/24 2103    sennosides-docusate (PERICOLACE) 8.6-50 MG per tablet 2 tablet  2 tablet Oral BID PRN Mp Sethi MD        And    polyethylene glycol (MIRALAX) packet 17 g  17 g Oral Daily PRN Mp Sethi MD        And    bisacodyl (DULCOLAX) EC tablet 5 mg  5 mg Oral Daily PRN Mp Sethi MD        And    bisacodyl (DULCOLAX) suppository 10 mg  10 mg Rectal Daily PRN Mp Sethi MD        busPIRone (BUSPAR) tablet 15 mg  15 mg Oral BID PRN Mp Sethi MD   15 mg at 08/10/24 1647    Calcium Replacement - Follow Nurse / BPA Driven Protocol   Does not apply PRN Mp Sethi MD        folic acid (FOLVITE) tablet 1 mg  1 mg Oral Daily Reggie Minor MD   1 mg at 08/12/24 0936    LORazepam (ATIVAN) tablet 1 mg  1 mg Oral Q1H PRN Reggie Minor MD   1 mg at 08/12/24 0134    Or    LORazepam (ATIVAN) injection 1 mg  1 mg Intravenous Q1H PRN Reggie Minor MD        Or    LORazepam (ATIVAN) tablet 2 mg  2 mg Oral Q1H PRN Reggie Minor MD   2 mg at 08/12/24 0231    Or    LORazepam (ATIVAN) injection 2 mg  2 mg Intravenous Q1H PRN Reggie Minor MD   2 mg at 08/12/24 0346    Or    LORazepam (ATIVAN) injection 2 mg  2 mg Intravenous Q15 Min PRN Reggie Minor MD        Or    LORazepam (ATIVAN) injection 2 mg  2 mg Intramuscular Q15 Min PRN Mily,  Reggie Roach MD        Magnesium Standard Dose Replacement - Follow Nurse / BPA Driven Protocol   Does not apply PRN Mp Sethi MD        metoprolol succinate XL (TOPROL-XL) 24 hr tablet 25 mg  25 mg Oral Q24H Heraclio Jiménez MD   25 mg at 08/12/24 0936    multivitamin with minerals 1 tablet  1 tablet Oral Daily Reggie Minor MD   1 tablet at 08/12/24 0936    nitroglycerin (NITROSTAT) SL tablet 0.4 mg  0.4 mg Sublingual Q5 Min PRN Mp Sethi MD        ondansetron ODT (ZOFRAN-ODT) disintegrating tablet 4 mg  4 mg Oral Q6H PRN Mp Sethi MD   4 mg at 08/10/24 1647    Or    ondansetron (ZOFRAN) injection 4 mg  4 mg Intravenous Q6H PRN Mp Sethi MD   4 mg at 08/11/24 1725    pantoprazole (PROTONIX) EC tablet 40 mg  40 mg Oral BID AC Reggie Minor MD   40 mg at 08/12/24 0645    Phosphorus Replacement - Follow Nurse / BPA Driven Protocol   Does not apply PRN Mp Sethi MD        rivaroxaban (XARELTO) tablet 15 mg  15 mg Oral Daily With Dinner Mp Sethi MD   15 mg at 08/11/24 1801    sertraline (ZOLOFT) tablet 100 mg  100 mg Oral Nightly Mp Sethi MD   100 mg at 08/11/24 2104    sodium chloride 0.9 % flush 10 mL  10 mL Intravenous Q12H Mp Sethi MD   10 mL at 08/12/24 0937    sodium chloride 0.9 % flush 10 mL  10 mL Intravenous PRN Mp Sethi MD        sodium chloride 0.9 % infusion 40 mL  40 mL Intravenous PRN Mp Sethi MD        spironolactone (ALDACTONE) tablet 25 mg  25 mg Oral BID Mp Sethi MD   25 mg at 08/12/24 0936    thiamine (B-1) injection 200 mg  200 mg Intravenous Q8H Reggie Minor MD   200 mg at 08/12/24 0647    Followed by    [START ON 8/17/2024] thiamine (VITAMIN B-1) tablet 100 mg  100 mg Oral Daily Reggie Minor MD           Assessment & Plan         Hypokalemia    Anemia    Anxiety    Acute on chronic systolic congestive heart  failure    Essential hypertension    EDMOND (acute kidney injury)    Alcohol withdrawal syndrome without complication      1.  EDMOND, likely prerenal from hypotension and CHF, with impaired renal autoregulation from ARB (if actually being taken).  Volume excess by exam and imaging; hypervolemic hyponatremia  2.  Severe hypokalemia, likely multifactorial:  diuretic and poor nutrition due to alcoholism.  Renal wasting a possibility.  Magnesium level normal  3.  Alcoholism.  Counseling provided  4.  Acute on chronic CHF.  proBNP over 10,000 . adjusting diuretics to improve volume status  5.  COPD with tobacco abuse  6.  Hypotension.  Currently on metoprolol.  Unable to use midodrine due to bradycardia  7.  Noncompliance, with fluid dietary restrictions and diuretics  8.  Hypervolemic hyponatremia responding to diuretic therapy following sodium trend     PLAN:  Continue KCl replacement.  Magnesium stable  Continue spironolactone to prevent renal loss and improve volume status  Will need to allow some degree of azotemia in a  attempt to achieve normovolemia  Continue surveillance labs    Celeste Meneses MD  08/12/24  10:34 EDT

## 2024-08-12 NOTE — THERAPY TREATMENT NOTE
Patient Name: Javi Doran  : 1960    MRN: [id number]                              Today's Date: 2024       Admit Date: 8/10/2024    Visit Dx:     ICD-10-CM ICD-9-CM   1. Hypokalemia  E87.6 276.8   2. EDMOND (acute kidney injury)  N17.9 584.9   3. Lower extremity edema  R60.0 782.3   4. Anemia, unspecified type  D64.9 285.9   5. Elevated troponin  R79.89 790.6     Patient Active Problem List   Diagnosis    Elevated alanine aminotransferase (ALT) level    Anemia    Anxiety    Coronary arteriosclerosis in native artery    Cobalamin deficiency    Mass of breast    Chronic kidney disease    Acute on chronic systolic congestive heart failure    Constipation    Gastroesophageal reflux disease    Fatigue    Gastric ulcer    Gynecomastia    History of alcohol abuse    Hyperlipidemia    Hypogonadism in male    Major depressive disorder, recurrent episode    Primary insomnia    Temporary cerebral vascular dysfunction    Essential hypertension    Benzodiazepine misuse    Hypokalemia    EDMOND (acute kidney injury)    Alcohol withdrawal syndrome without complication     Past Medical History:   Diagnosis Date    Alcoholism     Atrial fibrillation     Constipation     Depression     Depression with anxiety     Obstructive hypertrophic cardiomyopathy     Persistent insomnia     Solitary pulmonary nodule on lung CT      Past Surgical History:   Procedure Laterality Date    ADENOIDECTOMY      CARDIAC SURGERY      HEMORRHOIDECTOMY      OTHER SURGICAL HISTORY      PTCA: DIAGNOSTIC CATH CORONARY DOMINANCE    OTHER SURGICAL HISTORY      TONSILLECTOMY WITH ADENOIDECTOMY    TONSILLECTOMY        General Information       Row Name 24 1555          Physical Therapy Time and Intention    Document Type therapy note (daily note)  -     Mode of Treatment physical therapy  -       Row Name 24 1555          General Information    Existing Precautions/Restrictions fall;oxygen therapy device and L/min  -       Row Name  "08/12/24 1555          Cognition    Orientation Status (Cognition) oriented x 3  -               User Key  (r) = Recorded By, (t) = Taken By, (c) = Cosigned By      Initials Name Provider Type    Renée Farley PTA Physical Therapist Assistant                   Mobility       Row Name 08/12/24 1555          Bed Mobility    Bed Mobility supine-sit  -     Supine-Sit [place] (Bed Mobility) standby assist  -     Assistive Device (Bed Mobility) bed rails;head of bed elevated  -SM       Row Name 08/12/24 [facility]          Sit-Stand Transfer    Sit-Stand [place] (Transfers) contact guard;minimum assist (75% patient effort)  -SM       Row Name 08/12/24 1555          Gait/Stairs (Locomotion)    [place] Level (Gait) minimum assist (75% patient effort)  -     Patient was able to Ambulate yes  -     Distance in Feet (Gait) 25  -     Deviations/Abnormal Patterns (Gait) nita decreased;stride length decreased;ataxic  -     Bilateral Gait Deviations forward flexed posture  -     Comment, (Gait/Stairs) unsteady, pt stating he felt dizzy and \"queasy\"  -               User Key  (r) = Recorded By, (t) = Taken By, (c) = Cosigned By      Initials Name Provider Type    Renée Farley PTA Physical Therapist Assistant                   Obj/Interventions       Row Name 08/12/24 [facility]7          Motor Skills    Therapeutic Exercise --  seated AP and LAQ x10 reps  -               User Key  (r) = Recorded By, (t) = Taken By, (c) = Cosigned By      Initials Name Provider Type    Renée Farley PTA Physical Therapist Assistant                   Goals/Plan    No documentation.                  Clinical Impression       Row Name 08/12/24 1557          Pain    Pretreatment Pain Rating 0/10 - no pain  -     Posttreatment Pain Rating 0/10 - no pain  -SM       Row Name 08/12/24 1557          Positioning and Restraints    Pre-Treatment Position in bed  -     Post Treatment Position chair  - "     In Chair reclined;call light within reach;encouraged to call for assist;exit alarm on  -               User Key  (r) = Recorded By, (t) = Taken By, (c) = Cosigned By      Initials Name Provider Type    Renée Farley PTA Physical Therapist Assistant                   Outcome Measures       Row Name 08/12/24 1558 08/12/24 0936       How much help from another person do you currently need...    Turning from your back to your side while in flat bed without using bedrails? 3  -SM 4  -LH    Moving from lying on back to sitting on the side of a flat bed without bedrails? 3  -SM 4  -LH    Moving to and from a bed to a chair (including a wheelchair)? 3  -SM 3  -LH    Standing up from a chair using your arms (e.g., wheelchair, bedside chair)? 3  -SM 3  -LH    Climbing 3-5 steps with a railing? 2  -SM 3  -LH    To walk in hospital room? 3  -SM 3  -LH    AM-PAC 6 Clicks Score (PT) 17  - 20  -    Highest Level of Mobility Goal 5 --> Static standing  - 6 --> Walk 10 steps or more  -      Row Name 08/12/24 1558          Functional Assessment    Outcome Measure Options AM-PAC 6 Clicks Basic Mobility (PT)  -               User Key  (r) = Recorded By, (t) = Taken By, (c) = Cosigned By      Initials Name Provider Type    Renée Farley PTA Physical Therapist Assistant     Brittany Goodman, RN Registered Nurse                                 Physical Therapy Education       Title: PT OT SLP Therapies (Done)       Topic: Physical Therapy (Done)       Point: Mobility training (Done)       Learning Progress Summary             Patient Acceptance, E,TB,D, VU,NR by  at 8/12/2024 1558    Acceptance, E, VU,NR by  at 8/11/2024 1209                         Point: Home exercise program (Done)       Learning Progress Summary             Patient Acceptance, E,TB,D, VU,NR by  at 8/12/2024 1558                         Point: Body mechanics (Done)       Learning Progress Summary             Patient Acceptance,  "E,TB,D, VU,NR by  at 8/12/2024 1558    Acceptance, E, VU,NR by  at 8/11/2024 1209                         Point: Precautions (Done)       Learning Progress Summary             Patient Acceptance, E,TB,D, VU,NR by  at 8/12/2024 1558    Acceptance, E, VU,NR by  at 8/11/2024 1209                                         User Key       Initials Effective Dates Name Provider Type Discipline     03/07/18 -  Renée Aldrich PTA Physical Therapist Assistant PT    TARAH 10/25/19 -  Sierra Jamison PT Physical Therapist PT                  PT Recommendation and Plan     Plan of Care Reviewed With: patient  Progress: improving  Outcome Evaluation: Pt tolerated treatment fair this date. Pt very lethargic and requiring constant communication/activity to stay awake. Required SBA for bed mobility, then CGA to stand. Pt ambulated 25ft w/ CGA-min A, unsteady throughout. Pt reported feeling dizzy and \"queasy\". Encouraged pt to ambulate w/ nsg in room.     Time Calculation:         PT Charges       Row Name 08/12/24 1613             Time Calculation    Start Time 1508  -      Stop Time 1532  -      Time Calculation (min) 24 min  -      PT Received On 08/12/24  -      PT - Next Appointment 08/13/24  -                User Key  (r) = Recorded By, (t) = Taken By, (c) = Cosigned By      Initials Name Provider Type     Valdemar Renée Sherman PTA Physical Therapist Assistant                  Therapy Charges for Today       Code Description Service Date Service Provider Modifiers Qty    83184737881 HC GAIT TRAINING EA 15 MIN 8/12/2024 Renée Aldrich PTA GP 1    92316917012 HC PT THERAPEUTIC ACT EA 15 MIN 8/12/2024 Renée Aldrich PTA GP 1            PT G-Codes  Outcome Measure Options: AM-PAC 6 Clicks Basic Mobility (PT)  AM-PAC 6 Clicks Score (PT): 17  PT Discharge Summary  Anticipated Discharge Disposition (PT): skilled nursing facility    Renée Aldrich PTA  8/12/2024    "

## 2024-08-12 NOTE — PLAN OF CARE
Goal Outcome Evaluation:  Plan of Care Reviewed With: patient        Progress: improving                 Pt resting comfortably at this time. VSS. Pt very sleepy during shift--had trouble staying awake--falling asleep while talking to nurse. PT did work with PT today and did well--up to chair for a bit. Pt CIWA stayed below 6 entire shift--no ativan given this shift. Pt in bed currently--no issues at this time.

## 2024-08-12 NOTE — PLAN OF CARE
"Goal Outcome Evaluation:  Plan of Care Reviewed With: patient        Progress: improving  Outcome Evaluation: Pt tolerated treatment fair this date. Pt very lethargic and requiring constant communication/activity to stay awake. Required SBA for bed mobility, then CGA to stand. Pt ambulated 25ft w/ CGA-min A, unsteady throughout. Pt reported feeling dizzy and \"queasy\". Encouraged pt to ambulate w/ nsg in room.      Anticipated Discharge Disposition (PT): skilled nursing facility                        "

## 2024-08-12 NOTE — PROGRESS NOTES
"    Name: Javi Doran ADMIT: 8/10/2024   : 1960  PCP: Ginette Bryant MD    MRN: [id number] LOS: 1 days   AGE/SEX: 64 y.o. male  ROOM: [facility]     Subjective   Subjective   Several rounds of Ativan overnight.  CIWA score as high as 13 overnight but only 3 this morning.  He is very drowsy when I saw him, hard to wake up.  When he did finally wake up and I asked how he was he said \"never better\"       Objective   Objective   Vital Signs  Temp:  [96.8 °F (36 °C)-98.1 °F (36.7 °C)] 98.1 °F (36.7 °C)  Heart Rate:  [53-64] 63  Resp:  [16-18] 16  BP: ()/(72-80) 112/73  SpO2:  [90 %-100 %] 99 %  on  Flow (L/min):  [2] 2;   Device (Oxygen Therapy): nasal cannula  Body mass index is 23.23 kg/m².  Physical Exam  Vitals reviewed.   Constitutional:       General: He is not in acute distress.     Appearance: He is well-developed. He is ill-appearing.      Comments: Drowsy   HENT:      Head: Normocephalic and atraumatic.   Eyes:      General: Scleral icterus present.   Neck:      Vascular: No JVD.   Cardiovascular:      Rate and Rhythm: Normal rate and regular rhythm.      Heart sounds: No murmur heard.  Pulmonary:      Effort: Pulmonary effort is normal. No respiratory distress.      Breath sounds: Normal breath sounds. No wheezing.   Abdominal:      General: There is distension.      Palpations: Abdomen is soft.      Tenderness: There is no abdominal tenderness.   Musculoskeletal:      Right lower leg: No edema.      Left lower leg: No edema.   Skin:     General: Skin is warm and dry.      Coloration: Skin is pale.      Findings: No rash.       Results Review     I reviewed the patient's new clinical results.  Results from last 7 days   Lab Units 24  0619 24  0239 08/10/24  1141   WBC 10*3/mm3 9.72 9.05 8.70   HEMOGLOBIN g/dL 9.7* 8.8* 9.6*   PLATELETS 10*3/mm3 256 226 222     Results from last 7 days   Lab Units 24  0619 24  2128 24  0239 08/10/24  2228 " "08/10/24  1141   SODIUM mmol/L 135*  --  136 134* 134*   POTASSIUM mmol/L 3.4* 2.5* 2.1* 2.2* 1.8*   CHLORIDE mmol/L 94*  --  93* 91* 89*   CO2 mmol/L 23.7  --  27.0 27.0 30.0*   BUN mg/dL 35*  --  29* 26* 21   CREATININE mg/dL 1.83*  --  1.89* 1.74* 1.59*   GLUCOSE mg/dL 96  --  85 141* 132*   EGFR mL/min/1.73 41.0*  --  39.4* 43.5* 48.5*     Results from last 7 days   Lab Units 08/12/24  0619 08/11/24  0239 08/10/24  1141   ALBUMIN g/dL 3.6 3.6 4.1   BILIRUBIN mg/dL 3.1* 2.7* 2.6*   ALK PHOS U/L 276* 215* 219*   AST (SGOT) U/L 111* 73* 77*   ALT (SGPT) U/L 55* 36 37     Results from last 7 days   Lab Units 08/12/24  0619 08/11/24  0239 08/10/24  2228 08/10/24  1141   CALCIUM mg/dL 8.2* 8.2* 8.0* 8.6   ALBUMIN g/dL 3.6 3.6  --  4.1   MAGNESIUM mg/dL 2.3 2.3  --  2.0   PHOSPHORUS mg/dL 3.4 4.0  --  3.0       No results found for: \"HGBA1C\", \"POCGLU\"    No radiology results for the last day    I have personally reviewed all medications:  Scheduled Medications  amiodarone, 200 mg, Oral, Daily  arformoterol, 15 mcg, Nebulization, BID - RT   And  tiotropium bromide monohydrate, 2 puff, Inhalation, Daily - RT  [Held by provider] atorvastatin, 80 mg, Oral, Nightly  folic acid, 1 mg, Oral, Daily  metoprolol succinate XL, 25 mg, Oral, Q24H  multivitamin with minerals, 1 tablet, Oral, Daily  pantoprazole, 40 mg, Oral, BID AC  rivaroxaban, 15 mg, Oral, Daily With Dinner  sertraline, 100 mg, Oral, Nightly  sodium chloride, 10 mL, Intravenous, Q12H  spironolactone, 25 mg, Oral, BID  thiamine (B-1) IV, 200 mg, Intravenous, Q8H   Followed by  [START ON 8/17/2024] thiamine, 100 mg, Oral, Daily    Infusions   Diet  Diet: Regular/House, Cardiac; Healthy Heart (2-3 Na+); Fluid Consistency: Thin (IDDSI 0)    I have personally reviewed:  [x]  Laboratory   []  Microbiology   [x]  Radiology   []  EKG/Telemetry  [x]  Cardiology/Vascular   []  Pathology    []  Records       Assessment/Plan     Active Hospital Problems    Diagnosis  POA    " **Hypokalemia [E87.6]  Yes    EDMOND (acute kidney injury) [N17.9]  Yes    Alcohol withdrawal syndrome without complication [F10.930]  Yes    Essential hypertension [I10]  Yes    Anemia [D64.9]  Yes    Acute on chronic systolic congestive heart failure [I50.23]  Yes    Anxiety [F41.9]  Yes      Resolved Hospital Problems   No resolved problems to display.       64 y.o. male with history of alcohol abuse and NICM admitted with dyspnea, EDMOND and Hypokalemia.    Severe hypokalemia-improving with replacement.  No K protocol due to renal dysfunction    EDMOND stable: Defer diuresis to nephrology but tolerating Aldactone okay.    Dyspnea likely due to CHF though not severely overloaded currently.  - Diuresis per nephrology. Renal function not at baseline    ETOH abuse/withdrawal-continue thiamine/folate/MVI.  Lorazepam per CIWA protocol    LFTs worsening, likely alcoholic hepatitis.  Continue to monitor daily    Anemia- No overt bleeding though obviously some risk given ETOH use.  Continue PPI and monitor daily  - Hold AC if any overt bleeding    PAF s/p ablation- Toprol XL/Xarelto      SCD/Xarelto  Dispo: TBD.  Unlikely to be ready the next 48 hours      Reggie Minor MD  [facility]  08/12/24  15:06 EDT

## 2024-08-12 NOTE — NURSING NOTE
Access center note.    Patient resting in bed. He is alert and oriented x3. Forgetful at times. CIWA this shift 3-6. Primary RN reports patient drowsy this shift,  redirectable, cooperative with care. Current med zoloft. Per MD note drowsy, awakens to voice but falls asleep quickly. Overnight RN reported IV and po ativan administered per CIWA protocol.  Patient participated with PT today, lethargic, required constant communication/activity to stay awake. Per Access note VM left for CD therapist to f/u with patient . Access following.

## 2024-08-12 NOTE — NURSING NOTE
08/12/24 1327   Wound 08/11/24 2048 midline coccyx Skin Tear   Placement Date/Time: 08/11/24 2048   Orientation: midline  Location: coccyx  Primary Wound Type: Skin Tear  Additional Comments: the patient stated it was from a toilet seat   Pressure Injury Stage 2   Base clean;dry;pink   Periwound intact;blanchable   Periwound Temperature warm   Wound Length (cm) 0.8 cm   Wound Width (cm) 0.8 cm   Wound Surface Area (cm^2) 0.64 cm^2   Drainage Amount none   Dressing Care dressing applied;silicone;foam   Skin Interventions   Pressure Reduction Devices pressure-redistributing mattress utilized;alternating pressure pump (ADD)   Pressure Reduction Techniques positioned off wounds   Skin Protection silicone foam dressing in place     CWON note: pt seen for evaluation of sacral skin. Pt is alert, but drowsy and falls back to sleep very quickly. He is able to turn and reposition self independently with prompting. Pt is very thin and frail with pronounced bony prominences. Sacral Optifoam placed for protection. Heels with blanchable erythema. Pt to be placed on Centrella Pro+, wound care and prevention standing orders placed into [facility]. Discussed with RN.

## 2024-08-12 NOTE — PLAN OF CARE
Goal Outcome Evaluation:  Plan of Care Reviewed With: patient        Progress: no change  Outcome Evaluation: vitals stable.  pt denied pain.  pt expressed anxiety and nausea.  CIWA scores noted.  meds given per orders.  potassium replaced per orders.  skin breakdown noted.  wound consult placed.  will continue to monitor.

## 2024-08-12 NOTE — CONSULTS
"Access consult ordered for ETOH, depression and anxiety.     65 yo male presented to the ED with c/o lower extremity swelling on 8/10/24. Admitting diagnosis hypokalemia, EDMOND, anemia and elevated troponin. Patient has long history of alcohol abuse, tobacco abuse and CHF. Patient has had multiple hospital admissions for these diagnosis and was actually just discharged from [facility] where he was treated for alcoholic ketoacidosis.     Introduced myself and role in his care. Patient appears alert and oriented times 4. Agreeable to evaluation. Patient medicated with ativan 1mg at 1942 for alcohol withdrawal symptoms. Patient states his anxiety was a 10 and now \"not bad at all\". Medication effective.     Patient states he started drinking at age 53yo after finding his wife having an affair. States the affair had been going on for 7 years and \"everyone knew but me\". States she later  him after they . Patient states they had been  29 years. States he drank a beer and \"that's all it took\". States his preference is liquor and mixed drinks. States there have been times when he has drank 20 mixed drinks from 8am to midnight. States he usually drinks to \"go to sleep\". States he drinks alone at home. Adamant that as soon as he has one drink that he doesn't drive. Hx of substance use disorder treatment at [facility] on CD unit per his report and also at [facility] ([facility] now). Patient unsure of dates and length of stay. States longest period of sobriety 1-1.5 months. States he saw a therapist once after finding his wife having an affair. Patient not currently seeing a therapist or psychiatrist. Is not currently active with AA. Patient reports hx of alcohol withdrawal seizures; states last one was years ago. Hx of feeling \"clammy\" and \"shakes\" when attempting to stop drinking. Denies hallucinations.     Patient lives alone in [place], [place]. States he has 2 sons and 1 daughter. States his daughter " "is always \"busy with her boyfriend\" and he doesn't see her much. States he does have contact with his sons. States he also has a brother that he sees and some friends that he grew up with. States they are all aware of his drinking problem and are supportive. Patient did work in [employer] but hasn't worked in 20 years now; receives disability for his heart issues.     Patient states his mom  last December on her 88 birthday. States his father passed away at age 49 yo from brain aneurysm.     Patient denies SI. Denies HI. No overt psychosis. Denies hx of self harm or suicide attempt.     Patient states he went on a few dates since divorce but is not seeing anyone now. Talks about feeling lonely and isolated at length. Spoke with patient at length re importance of support and socialization. Talked about attending AA to get out of the house and be around other people struggling with addiction. States he doesn't drink and drive so other benefit would be he would stay sober to attend meetings. States his sister-in-law has written down the locations of AA meetings in [place] for him. Offered active listening and support. Encouraged patient to follow up with AA.     Access will follow. Will leave VM for CD therapist to also see.   "

## 2024-08-12 NOTE — PROGRESS NOTES
[facility]  Cardiology Progress Note    Patient Identification:  Name: Javi Doran  Age: 64 y.o.  Sex: male  :  1960  MRN: [id number]                 Follow Up / Chief Complaint: Follow-up for CHF    Interval History: Resting in bed, drowsy this morning, awakes to voice but falls asleep quickly, received IV and po ativan overnight per CIWA. ,on 2 L nasal cannula, still reports shortness of breath, no chest pain       Objective:    Past Medical History:  Past Medical History:   Diagnosis Date    Alcoholism     Atrial fibrillation     Constipation     Depression     Depression with anxiety     Obstructive hypertrophic cardiomyopathy     Persistent insomnia     Solitary pulmonary nodule on lung CT      Past Surgical History:  Past Surgical History:   Procedure Laterality Date    ADENOIDECTOMY      CARDIAC SURGERY      HEMORRHOIDECTOMY      OTHER SURGICAL HISTORY      PTCA: DIAGNOSTIC CATH CORONARY DOMINANCE    OTHER SURGICAL HISTORY      TONSILLECTOMY WITH ADENOIDECTOMY    TONSILLECTOMY          Social History:   Social History     Tobacco Use    Smoking status: Every Day     Current packs/day: 1.00     Types: Cigarettes    Smokeless tobacco: Not on file   Substance Use Topics    Alcohol use: Yes     Comment: former alcoholic. drinks occassionally.      Family History:  Family History   Problem Relation Age of Onset    Cardiomyopathy Brother           Allergies:  No Known Allergies  Scheduled Meds:  amiodarone, 200 mg, Daily  arformoterol, 15 mcg, BID - RT   And  tiotropium bromide monohydrate, 2 puff, Daily - RT  [Held by provider] atorvastatin, 80 mg, Nightly  folic acid, 1 mg, Daily  metoprolol succinate XL, 25 mg, Q24H  multivitamin with minerals, 1 tablet, Daily  pantoprazole, 40 mg, BID AC  rivaroxaban, 15 mg, Daily With Dinner  sertraline, 100 mg, Nightly  sodium chloride, 10 mL, Q12H  spironolactone, 25 mg, BID  thiamine (B-1) IV, 200 mg, Q8H   Followed by  [START ON 2024]  thiamine, 100 mg, Daily            INTAKE AND OUTPUT:    Intake/Output Summary (Last 24 hours) at 8/12/2024 0946  Last data filed at 8/12/2024 0648  Gross per 24 hour   Intake 1058 ml   Output 600 ml   Net 458 ml       Review of Systems:   GI: no n/v or abd pain  Cardiac: no chest pain or palpitations  Pulmonary: no shortness of breath or cough      Constitutional:  Temp:  [96.8 °F (36 °C)-98.1 °F (36.7 °C)] 98.1 °F (36.7 °C)  Heart Rate:  [52-64] 64  Resp:  [16-18] 17  BP: ()/(72-80) 112/73    Physical Exam:  General:  drowsy, cooperative, appears in no acute distress  Respiratory: coarse expiratory bilateral  Normal respiratory effort and rate.  Cardiovascular: S1S2 Regular rate and rhythm. No murmur, rub or gallop.   Gastrointestinal: soft, non tender. Bowel sounds present.   Extremities: PRETTY x4. No pretibial pitting edema. Adequate musculoskeletal strength.   Neuro: drowsy, oriented, CN II-XII grossly intact        Cardiographics       Echocardiogram:   Summary:                 The ejection fraction biplane was calculated at 34%.                            Mild left ventricular hypertrophy.                            The estimated ejection fraction is 30-35%.Anteroseptal, apical and anteroapical wall is akinetic to                            dyskinetic.                            Mitral valve tissue Doppler E/E'' ratio consistent with elevated left atrial pressures.                            Intravenous contrast was used to enhance endocardial border definition.                            Mild to moderately dilated left atrium.                            The right ventricular chamber size and systolic function are within normal limits.                            There is mild right atrial enlargement.                            The aortic valve appears grossly normal in structure . There is mild aortic regurgitation.                            The mitral valve appears mildly thickened. There is trace to  "mild mitral regurgitation.                            Right ventricular systolic pressure of 29 mmHg.                            Tricuspid valve is structurally normal.                            Trace-to-mild tricuspid regurgitation.                            The aortic root appears normal.                            There is no dilatation of the ascending aorta.                            No evidence of pericardial effusion.   Lab Review   Results from last 7 days   Lab Units 08/10/24  1527 08/10/24  1141   HSTROP T ng/L 40* 50*     Results from last 7 days   Lab Units 08/12/24  0619   MAGNESIUM mg/dL 2.3     Results from last 7 days   Lab Units 08/12/24  0619   SODIUM mmol/L 135*   POTASSIUM mmol/L 3.4*   BUN mg/dL 35*   CREATININE mg/dL 1.83*   CALCIUM mg/dL 8.2*     @LABRCNTIPbnp@  Results from last 7 days   Lab Units 08/12/24  0619 08/11/24  0239 08/10/24  1141   WBC 10*3/mm3 9.72 9.05 8.70   HEMOGLOBIN g/dL 9.7* 8.8* 9.6*   HEMATOCRIT % 30.1* 27.8* 30.2*   PLATELETS 10*3/mm3 256 226 222     Results from last 7 days   Lab Units 08/10/24  1141   INR  3.30*   APTT seconds 46.6*         Assessment/plan:  1.  Acute on chronic systolic congestive heart failure: no le edema, reports shortness of breath, 99% on 2l nc. Diuresing per nephrology  2.  History of hypertrophic cardiomyopathy/myomectomy/Maze procedure/mitral valve repair  3.  Paroxysmal atrial fibrillation: controlled on amiodarone, AC on xarelto  4.  Coronary artery disease with history of multiple stents: no chest pain, troponin trending down. Noted cath from 2/2023 with widely patent stents in circumflex and rca, mod proximal LAD 50-60%  5.  Chronic alcohol abuse: on ciwa, per primary  6.  Severe hypokalemia: 3.4 today, replacement per nephrology  7.  CKD  8. Hypertension: bp stable      )8/12/2024  LELA Rene/Transcription:   \"Dictated utilizing Dragon dictation\".     "

## 2024-08-13 ENCOUNTER — APPOINTMENT (OUTPATIENT)
Dept: GENERAL RADIOLOGY | Facility: HOSPITAL | Age: 64
DRG: 682 | End: 2024-08-13
Payer: MEDICARE

## 2024-08-13 LAB
ALBUMIN SERPL-MCNC: 3.5 G/DL (ref 3.5–5.2)
ALP SERPL-CCNC: 294 U/L (ref 39–117)
ALT SERPL W P-5'-P-CCNC: 74 U/L (ref 1–41)
AMMONIA BLD-SCNC: 19 UMOL/L (ref 16–60)
ANION GAP SERPL CALCULATED.3IONS-SCNC: 14.7 MMOL/L (ref 5–15)
AST SERPL-CCNC: 141 U/L (ref 1–40)
BILIRUB CONJ SERPL-MCNC: 2.1 MG/DL (ref 0–0.3)
BILIRUB INDIRECT SERPL-MCNC: 0.8 MG/DL
BILIRUB SERPL-MCNC: 2.9 MG/DL (ref 0–1.2)
BUN SERPL-MCNC: 34 MG/DL (ref 8–23)
BUN/CREAT SERPL: 18.5 (ref 7–25)
CALCIUM SPEC-SCNC: 8.2 MG/DL (ref 8.6–10.5)
CHLORIDE SERPL-SCNC: 96 MMOL/L (ref 98–107)
CO2 SERPL-SCNC: 23.3 MMOL/L (ref 22–29)
CREAT SERPL-MCNC: 1.84 MG/DL (ref 0.76–1.27)
DEPRECATED RDW RBC AUTO: 63.2 FL (ref 37–54)
EGFRCR SERPLBLD CKD-EPI 2021: 40.7 ML/MIN/1.73
ERYTHROCYTE [DISTWIDTH] IN BLOOD BY AUTOMATED COUNT: 20.1 % (ref 12.3–15.4)
FERRITIN SERPL-MCNC: 295 NG/ML (ref 30–400)
GLUCOSE SERPL-MCNC: 101 MG/DL (ref 65–99)
HCT VFR BLD AUTO: 30.2 % (ref 37.5–51)
HGB BLD-MCNC: 9.5 G/DL (ref 13–17.7)
MAGNESIUM SERPL-MCNC: 2.4 MG/DL (ref 1.6–2.4)
MCH RBC QN AUTO: 26.7 PG (ref 26.6–33)
MCHC RBC AUTO-ENTMCNC: 31.5 G/DL (ref 31.5–35.7)
MCV RBC AUTO: 84.8 FL (ref 79–97)
PHOSPHATE SERPL-MCNC: 2.5 MG/DL (ref 2.5–4.5)
PLATELET # BLD AUTO: 257 10*3/MM3 (ref 140–450)
PMV BLD AUTO: 9.5 FL (ref 6–12)
POTASSIUM SERPL-SCNC: 3.5 MMOL/L (ref 3.5–5.2)
PROT SERPL-MCNC: 6 G/DL (ref 6–8.5)
QT INTERVAL: 622 MS
QTC INTERVAL: 637 MS
RBC # BLD AUTO: 3.56 10*6/MM3 (ref 4.14–5.8)
SODIUM SERPL-SCNC: 134 MMOL/L (ref 136–145)
WBC NRBC COR # BLD AUTO: 7.8 10*3/MM3 (ref 3.4–10.8)

## 2024-08-13 PROCEDURE — 94761 N-INVAS EAR/PLS OXIMETRY MLT: CPT

## 2024-08-13 PROCEDURE — 94664 DEMO&/EVAL PT USE INHALER: CPT

## 2024-08-13 PROCEDURE — 85027 COMPLETE CBC AUTOMATED: CPT | Performed by: HOSPITALIST

## 2024-08-13 PROCEDURE — 71046 X-RAY EXAM CHEST 2 VIEWS: CPT

## 2024-08-13 PROCEDURE — 82728 ASSAY OF FERRITIN: CPT | Performed by: INTERNAL MEDICINE

## 2024-08-13 PROCEDURE — 80048 BASIC METABOLIC PNL TOTAL CA: CPT | Performed by: INTERNAL MEDICINE

## 2024-08-13 PROCEDURE — 94799 UNLISTED PULMONARY SVC/PX: CPT

## 2024-08-13 PROCEDURE — 80076 HEPATIC FUNCTION PANEL: CPT | Performed by: HOSPITALIST

## 2024-08-13 PROCEDURE — 25010000002 THIAMINE PER 100 MG: Performed by: HOSPITALIST

## 2024-08-13 PROCEDURE — 94760 N-INVAS EAR/PLS OXIMETRY 1: CPT

## 2024-08-13 PROCEDURE — 25010000002 FUROSEMIDE PER 20 MG: Performed by: INTERNAL MEDICINE

## 2024-08-13 PROCEDURE — 93010 ELECTROCARDIOGRAM REPORT: CPT | Performed by: INTERNAL MEDICINE

## 2024-08-13 PROCEDURE — 93005 ELECTROCARDIOGRAM TRACING: CPT | Performed by: NURSE PRACTITIONER

## 2024-08-13 PROCEDURE — 83735 ASSAY OF MAGNESIUM: CPT | Performed by: INTERNAL MEDICINE

## 2024-08-13 PROCEDURE — 84100 ASSAY OF PHOSPHORUS: CPT | Performed by: HOSPITALIST

## 2024-08-13 PROCEDURE — 99232 SBSQ HOSP IP/OBS MODERATE 35: CPT | Performed by: NURSE PRACTITIONER

## 2024-08-13 PROCEDURE — 82140 ASSAY OF AMMONIA: CPT | Performed by: HOSPITALIST

## 2024-08-13 RX ORDER — SPIRONOLACTONE 25 MG/1
25 TABLET ORAL DAILY
Status: DISCONTINUED | OUTPATIENT
Start: 2024-08-14 | End: 2024-08-15

## 2024-08-13 RX ORDER — METOPROLOL SUCCINATE 25 MG/1
12.5 TABLET, EXTENDED RELEASE ORAL
Status: DISCONTINUED | OUTPATIENT
Start: 2024-08-14 | End: 2024-08-19 | Stop reason: HOSPADM

## 2024-08-13 RX ORDER — FUROSEMIDE 10 MG/ML
40 INJECTION INTRAMUSCULAR; INTRAVENOUS EVERY 12 HOURS
Status: DISCONTINUED | OUTPATIENT
Start: 2024-08-13 | End: 2024-08-14

## 2024-08-13 RX ORDER — POTASSIUM CHLORIDE 750 MG/1
40 TABLET, FILM COATED, EXTENDED RELEASE ORAL ONCE
Status: COMPLETED | OUTPATIENT
Start: 2024-08-13 | End: 2024-08-13

## 2024-08-13 RX ADMIN — PANTOPRAZOLE SODIUM 40 MG: 40 TABLET, DELAYED RELEASE ORAL at 17:33

## 2024-08-13 RX ADMIN — RIVAROXABAN 15 MG: 15 TABLET, FILM COATED ORAL at 17:33

## 2024-08-13 RX ADMIN — Medication 1 TABLET: at 10:17

## 2024-08-13 RX ADMIN — POTASSIUM CHLORIDE 40 MEQ: 750 TABLET, EXTENDED RELEASE ORAL at 15:06

## 2024-08-13 RX ADMIN — ARFORMOTEROL TARTRATE 15 MCG: 15 SOLUTION RESPIRATORY (INHALATION) at 19:48

## 2024-08-13 RX ADMIN — FOLIC ACID 1 MG: 1 TABLET ORAL at 10:17

## 2024-08-13 RX ADMIN — SPIRONOLACTONE 25 MG: 25 TABLET, FILM COATED ORAL at 10:17

## 2024-08-13 RX ADMIN — THIAMINE HYDROCHLORIDE 200 MG: 100 INJECTION, SOLUTION INTRAMUSCULAR; INTRAVENOUS at 21:56

## 2024-08-13 RX ADMIN — Medication 10 ML: at 21:56

## 2024-08-13 RX ADMIN — FUROSEMIDE 40 MG: 10 INJECTION, SOLUTION INTRAMUSCULAR; INTRAVENOUS at 15:07

## 2024-08-13 RX ADMIN — THIAMINE HYDROCHLORIDE 200 MG: 100 INJECTION, SOLUTION INTRAMUSCULAR; INTRAVENOUS at 06:29

## 2024-08-13 RX ADMIN — ARFORMOTEROL TARTRATE 15 MCG: 15 SOLUTION RESPIRATORY (INHALATION) at 08:35

## 2024-08-13 RX ADMIN — BUSPIRONE HYDROCHLORIDE 15 MG: 15 TABLET ORAL at 23:55

## 2024-08-13 RX ADMIN — TIOTROPIUM BROMIDE INHALATION SPRAY 2 PUFF: 3.12 SPRAY, METERED RESPIRATORY (INHALATION) at 08:36

## 2024-08-13 RX ADMIN — LORAZEPAM 1 MG: 1 TABLET ORAL at 22:03

## 2024-08-13 RX ADMIN — PANTOPRAZOLE SODIUM 40 MG: 40 TABLET, DELAYED RELEASE ORAL at 06:30

## 2024-08-13 RX ADMIN — SERTRALINE 100 MG: 100 TABLET, FILM COATED ORAL at 21:56

## 2024-08-13 RX ADMIN — Medication 10 ML: at 10:17

## 2024-08-13 RX ADMIN — METOPROLOL SUCCINATE 25 MG: 25 TABLET, EXTENDED RELEASE ORAL at 10:17

## 2024-08-13 RX ADMIN — THIAMINE HYDROCHLORIDE 200 MG: 100 INJECTION, SOLUTION INTRAMUSCULAR; INTRAVENOUS at 15:06

## 2024-08-13 RX ADMIN — AMIODARONE HYDROCHLORIDE 200 MG: 200 TABLET ORAL at 10:17

## 2024-08-13 RX ADMIN — ALBUTEROL SULFATE 2.5 MG: 2.5 SOLUTION RESPIRATORY (INHALATION) at 17:31

## 2024-08-13 NOTE — NURSING NOTE
Access follow up regarding ETOH: last CIWA score of 5. Some disorientation and anxiety overnight. Ativan early in the evening per withdrawal protocol. Appeared to get some rest overnight. Following. [facility] therapist to see.

## 2024-08-13 NOTE — PLAN OF CARE
Goal Outcome Evaluation:  Plan of Care Reviewed With: patient        Progress: no change  Outcome Evaluation: vitals stable.  pt denied pain.  pt expressed anxiety.  CIWA scores noted.  meds given per orders.  will continue to monitor.

## 2024-08-13 NOTE — PLAN OF CARE
Goal Outcome Evaluation:  Plan of Care Reviewed With: patient        Progress: improving  Outcome Evaluation: VSS during shift. Pt on 2 L N/C--pt SOB during shift--alerted MD--chest xray obtained--and pt started on IV lasix. PT still very sleepy during shift. No ativan given--CIWA stayed under 8 entire shift. PT resting comfortably at this time.

## 2024-08-13 NOTE — PROGRESS NOTES
[facility]  Cardiology Progress Note    Patient Identification:  Name: Javi Doran  Age: 64 y.o.  Sex: male  :  1960  MRN: [id number]                 Follow Up / Chief Complaint: follow up for chf    Interval History:CIWA protocol. Resting in bed.  States he is having some shortness of breath.  Nephrology adjusted medications       Objective:    Past Medical History:  Past Medical History:   Diagnosis Date    Alcoholism     Atrial fibrillation     Constipation     Depression     Depression with anxiety     Obstructive hypertrophic cardiomyopathy     Persistent insomnia     Solitary pulmonary nodule on lung CT      Past Surgical History:  Past Surgical History:   Procedure Laterality Date    ADENOIDECTOMY      CARDIAC SURGERY      HEMORRHOIDECTOMY      OTHER SURGICAL HISTORY      PTCA: DIAGNOSTIC CATH CORONARY DOMINANCE    OTHER SURGICAL HISTORY      TONSILLECTOMY WITH ADENOIDECTOMY    TONSILLECTOMY          Social History:   Social History     Tobacco Use    Smoking status: Every Day     Current packs/day: 1.00     Types: Cigarettes    Smokeless tobacco: Not on file   Substance Use Topics    Alcohol use: Yes     Comment: former alcoholic. drinks occassionally.      Family History:  Family History   Problem Relation Age of Onset    Cardiomyopathy Brother           Allergies:  No Known Allergies  Scheduled Meds:  amiodarone, 200 mg, Daily  arformoterol, 15 mcg, BID - RT   And  tiotropium bromide monohydrate, 2 puff, Daily - RT  [Held by provider] atorvastatin, 80 mg, Nightly  folic acid, 1 mg, Daily  metoprolol succinate XL, 25 mg, Q24H  multivitamin with minerals, 1 tablet, Daily  pantoprazole, 40 mg, BID AC  rivaroxaban, 15 mg, Daily With Dinner  sertraline, 100 mg, Nightly  sodium chloride, 10 mL, Q12H  spironolactone, 25 mg, BID  thiamine (B-1) IV, 200 mg, Q8H   Followed by  [START ON 2024] thiamine, 100 mg, Daily            INTAKE AND OUTPUT:    Intake/Output Summary (Last 24  hours) at 2024 1216  Last data filed at 2024 0631  Gross per 24 hour   Intake 840 ml   Output 600 ml   Net 240 ml       Review of Systems:   GI: no n/v  Cardiac: no cp  Pulmonary: +shob     Constitutional:  Temp:  [97.4 °F (36.3 °C)-98.2 °F (36.8 °C)] 97.4 °F (36.3 °C)  Heart Rate:  [60-67] 64  Resp:  [16-20] 20  BP: (102-115)/(69-85) 111/85    Physical Exam:  General:  Appears in no acute distress, resting in bed.  Eyes: eom normal and no conjunctival drainage  HEENT:  No JVD. Thyroid not visibly enlarged. No mucosal pallor or cyanosis  Respiratory: Respirations regular and unlabored at rest. BBS with good air entry in all fields. No crackles, rubs or wheezes auscultated  Cardiovascular: S1S2 Regular rate and rhythm. No murmur, rub or gallop. No carotid bruits. DP/PT pulses     . No pretibial pitting edema  Gastrointestinal: Abdomen soft, flat, non tender. Bowel sounds present. No hepatosplenomegaly. No ascites  Musculoskeletal: PRETTY x4. No abnormal movements  Extremities: No digital clubbing or cyanosis  Skin:  Skin warm and dry to touch. No rashes    Neuro: AAO x3 CN II-XII grossly intact  Psych: Mood and affect normal, pleasant and cooperative          Cardiographics  Telemetry:     EC/2024  Physician Conclusions   Any valve disease noted in the report is non-rheumatic unless otherwise specifically noted.   Summary:                 The ejection fraction biplane was calculated at 34%.                            Mild left ventricular hypertrophy.                            The estimated ejection fraction is 30-35%.Anteroseptal, apical and anteroapical wall is akinetic to                            dyskinetic.                            Mitral valve tissue Doppler E/E'' ratio consistent with elevated left atrial pressures.                            Intravenous contrast was used to enhance endocardial border definition.                            Mild to moderately dilated left atrium.                             The right ventricular chamber size and systolic function are within normal limits.                            There is mild right atrial enlargement.                            The aortic valve appears grossly normal in structure . There is mild aortic regurgitation.                            The mitral valve appears mildly thickened. There is trace to mild mitral regurgitation.                            Right ventricular systolic pressure of 29 mmHg.                            Tricuspid valve is structurally normal.                            Trace-to-mild tricuspid regurgitation.                            The aortic root appears normal.                            There is no dilatation of the ascending aorta.                            No evidence of pericardial effusion.   Lab Review   Results from last 7 days   Lab Units 08/10/24  1527 08/10/24  1141   HSTROP T ng/L 40* 50*     Results from last 7 days   Lab Units 08/13/24  0443   MAGNESIUM mg/dL 2.4     Results from last 7 days   Lab Units 08/13/24  0443   SODIUM mmol/L 134*   POTASSIUM mmol/L 3.5   BUN mg/dL 34*   CREATININE mg/dL 1.84*   CALCIUM mg/dL 8.2*     @LABRCNTIPbnp@  Results from last 7 days   Lab Units 08/13/24  0443 08/12/24  0619 08/11/24  0239   WBC 10*3/mm3 7.80 9.72 9.05   HEMOGLOBIN g/dL 9.5* 9.7* 8.8*   HEMATOCRIT % 30.2* 30.1* 27.8*   PLATELETS 10*3/mm3 257 256 226     Results from last 7 days   Lab Units 08/10/24  1141   INR  3.30*   APTT seconds 46.6*         Assessment:  -Acute on chronic systolic CHF: Diuresing per nephrology. Continue spirolactone, and BB  - History of hypertrophic cardiomyopathy/myomectomy/Maze procedure/MVR  -PAF: Controlled on amiodarone, anticoagulated on Xarelto.  CAD with history of multiple stents: No chest pain.  Troponins trending down.  Cardiac cath with noted widely patent stents in the circumflex and RCA with moderate proximal LAD.  60% in 2023.  -Chronic alcohol abuse: On CIWA protocol.   "Defer to attending.    -Severe hypokalemia: Today 3.5.  -CKD  -Hypertension: Stable.      Plan:  - Blood pressure and HR stable.  -SR on the monitor.  Will do EKG.  -Nephrology replacing diuresing and electrolytes.    )8/13/2024  LELA Wade/Transcription:   \"Dictated utilizing Dragon dictation\".     "

## 2024-08-13 NOTE — PROGRESS NOTES
Name: Javi Doran ADMIT: 8/10/2024   : 1960  PCP: Ginette Bryant MD    MRN: [id number] LOS: 2 days   AGE/SEX: 64 y.o. male  ROOM: [facility]     Subjective   Subjective   Overall better and only qualified for lorazepam 1 time last night and none today.  Remains pretty sleepy.    On questioning, he says he feels nauseated and short of breath when he tries to get up.  Reports he can do very little activity for this reason       Objective   Objective   Vital Signs  Temp:  [97.4 °F (36.3 °C)-98.1 °F (36.7 °C)] 97.5 °F (36.4 °C)  Heart Rate:  [63-67] 63  Resp:  [16-20] 18  BP: (102-115)/(69-85) 108/76  SpO2:  [92 %-100 %] 92 %  on  Flow (L/min):  [2] 2;   Device (Oxygen Therapy): room air  Body mass index is 23.32 kg/m².  Physical Exam  Vitals reviewed.   Constitutional:       General: He is not in acute distress.     Appearance: He is well-developed. He is ill-appearing.      Comments: Drowsy   HENT:      Head: Normocephalic and atraumatic.   Eyes:      General: Scleral icterus present.   Neck:      Vascular: No JVD.   Cardiovascular:      Rate and Rhythm: Normal rate and regular rhythm.      Heart sounds: No murmur heard.  Pulmonary:      Effort: Pulmonary effort is normal. No respiratory distress.      Breath sounds: Normal breath sounds. No wheezing.   Abdominal:      General: There is distension.      Palpations: Abdomen is soft.      Tenderness: There is no abdominal tenderness.   Musculoskeletal:      Right lower leg: No edema.      Left lower leg: No edema.   Skin:     General: Skin is warm and dry.      Coloration: Skin is pale.      Findings: No rash.   Neurological:      Comments: No real tremor or asterixis       Results Review     I reviewed the patient's new clinical results.  Results from last 7 days   Lab Units 24  0443 24  0619 24  0239 08/10/24  1141   WBC 10*3/mm3 7.80 9.72 9.05 8.70   HEMOGLOBIN g/dL 9.5* 9.7* 8.8* 9.6*   PLATELETS 10*3/mm3 431 166  "226 222     Results from last 7 days   Lab Units 08/13/24  0443 08/12/24  0619 08/11/24  2128 08/11/24  0239 08/10/24  2228   SODIUM mmol/L 134* 135*  --  136 134*   POTASSIUM mmol/L 3.5 3.4* 2.5* 2.1* 2.2*   CHLORIDE mmol/L 96* 94*  --  93* 91*   CO2 mmol/L 23.3 23.7  --  27.0 27.0   BUN mg/dL 34* 35*  --  29* 26*   CREATININE mg/dL 1.84* 1.83*  --  1.89* 1.74*   GLUCOSE mg/dL 101* 96  --  85 141*   EGFR mL/min/1.73 40.7* 41.0*  --  39.4* 43.5*     Results from last 7 days   Lab Units 08/13/24  0443 08/12/24  0619 08/11/24  0239 08/10/24  1141   ALBUMIN g/dL 3.5 3.6 3.6 4.1   BILIRUBIN mg/dL 2.9* 3.1* 2.7* 2.6*   ALK PHOS U/L 294* 276* 215* 219*   AST (SGOT) U/L 141* 111* 73* 77*   ALT (SGPT) U/L 74* 55* 36 37     Results from last 7 days   Lab Units 08/13/24  0443 08/12/24  0619 08/11/24  0239 08/10/24  2228 08/10/24  1141   CALCIUM mg/dL 8.2* 8.2* 8.2* 8.0* 8.6   ALBUMIN g/dL 3.5 3.6 3.6  --  4.1   MAGNESIUM mg/dL 2.4 2.3 2.3  --  2.0   PHOSPHORUS mg/dL 2.5 3.4 4.0  --  3.0       No results found for: \"HGBA1C\", \"POCGLU\"    XR Chest PA & Lateral    Result Date: 8/13/2024  As described.  This report was finalized on 8/13/2024 2:50 PM by Dr. Dustin Huffman M.D on Workstation: [id number]       I have personally reviewed all medications:  Scheduled Medications  amiodarone, 200 mg, Oral, Daily  arformoterol, 15 mcg, Nebulization, BID - RT   And  tiotropium bromide monohydrate, 2 puff, Inhalation, Daily - RT  [Held by provider] atorvastatin, 80 mg, Oral, Nightly  folic acid, 1 mg, Oral, Daily  furosemide, 40 mg, Intravenous, Q12H  [START ON 8/14/2024] metoprolol succinate XL, 12.5 mg, Oral, Q24H  multivitamin with minerals, 1 tablet, Oral, Daily  pantoprazole, 40 mg, Oral, BID AC  rivaroxaban, 15 mg, Oral, Daily With Dinner  sertraline, 100 mg, Oral, Nightly  sodium chloride, 10 mL, Intravenous, Q12H  [START ON 8/14/2024] spironolactone, 25 mg, Oral, Daily  thiamine (B-1) IV, 200 mg, Intravenous, Q8H   Followed " by  [START ON 8/17/2024] thiamine, 100 mg, Oral, Daily    Infusions   Diet  Diet: Regular/House, Cardiac; Healthy Heart (2-3 Na+); Fluid Consistency: Thin (IDDSI 0)    I have personally reviewed:  [x]  Laboratory   []  Microbiology   [x]  Radiology   []  EKG/Telemetry  [x]  Cardiology/Vascular   []  Pathology    []  Records       Assessment/Plan     Active Hospital Problems    Diagnosis  POA    **Hypokalemia [E87.6]  Yes    EDMOND (acute kidney injury) [N17.9]  Yes    Alcohol withdrawal syndrome without complication [F10.930]  Yes    Essential hypertension [I10]  Yes    Anemia [D64.9]  Yes    Acute on chronic systolic congestive heart failure [I50.23]  Yes    Anxiety [F41.9]  Yes      Resolved Hospital Problems   No resolved problems to display.       64 y.o. male with history of alcohol abuse and NICM admitted with dyspnea, EDMOND and Hypokalemia.    Dyspnea: Likely due to ongoing CHF.  Ordered repeat CXR which shows persistent/worse overload.  - Nephrology has ordered Lasix which I agree with but will monitor creatinine closely    Severe hypokalemia-better after replacement    EDMOND stable: Diuresis ordered as above.  Tolerating Aldactone okay.    ETOH abuse/withdrawal-continue thiamine/folate/MVI.  Lorazepam per CIWA protocol though not on request    Alcoholic hepatitis: Labs relatively stable overnight though slightly worse in some cases.  Continue to monitor daily  - Checked ammonia which is normal    Anemia- No overt bleeding though obviously some risk given ETOH use.  Continue PPI and monitor daily  - Hold AC if any overt bleeding    PAF s/p ablation- Toprol XL/Xarelto      SCD/Xarelto  Dispo: TBD.  Unlikely to be ready the next 48 hours  PT as tolerated      Reggie Minor MD  [facility]  08/13/24  16:56 EDT

## 2024-08-13 NOTE — PROGRESS NOTES
[facility] Progress Note      Patient Name: Javi Doran  : 1960  MRN: [id number]  Primary Care Physician:  Ginette Bryant MD  Date of admission: 8/10/2024    Subjective     Interval History:   Follow-up acute kidney injury.  Urine output not all recorded.  Slightly agitated.  Incontinent once today.  Feels slightly short of breath.  Review of Systems:   As noted above    Objective     Vitals:   Temp:  [97.4 °F (36.3 °C)-98.1 °F (36.7 °C)] 97.5 °F (36.4 °C)  Heart Rate:  [63-67] 63  Resp:  [16-20] 18  BP: (102-115)/(69-85) 108/76  Flow (L/min):  [2] 2    Intake/Output Summary (Last 24 hours) at 2024 1441  Last data filed at 2024 0631  Gross per 24 hour   Intake 840 ml   Output 600 ml   Net 240 ml       Physical Exam:    General Appearance: Agitated, impulsive.  Skin: warm and dry  HEENT: oral mucosa normal, nonicteric sclera  Neck: supple, no JVD  Lungs: Few scattered rales.  No wheezing.  Unlabored on 2 L nasal cannula.  Heart: RRR, normal S1 and S2  Abdomen: soft, nontender, nondistended. +bs  : no palpable bladder  Extremities: 1+ lower extremity edema  Neuro: Restless.  Tells me he thinks he has a piece of pie in his hand.  Scheduled Meds:     amiodarone, 200 mg, Oral, Daily  arformoterol, 15 mcg, Nebulization, BID - RT   And  tiotropium bromide monohydrate, 2 puff, Inhalation, Daily - RT  [Held by provider] atorvastatin, 80 mg, Oral, Nightly  folic acid, 1 mg, Oral, Daily  [START ON 2024] metoprolol succinate XL, 12.5 mg, Oral, Q24H  multivitamin with minerals, 1 tablet, Oral, Daily  pantoprazole, 40 mg, Oral, BID AC  rivaroxaban, 15 mg, Oral, Daily With Dinner  sertraline, 100 mg, Oral, Nightly  sodium chloride, 10 mL, Intravenous, Q12H  [START ON 2024] spironolactone, 25 mg, Oral, Daily  thiamine (B-1) IV, 200 mg, Intravenous, Q8H   Followed by  [START ON 2024] thiamine, 100 mg, Oral, Daily      IV Meds:        Results  Reviewed:   I have personally reviewed the results from the time of this admission to 8/13/2024 14:41 EDT     Results from last 7 days   Lab Units 08/13/24 0443 08/12/24 0619 08/11/24 2128 08/11/24  0239   SODIUM mmol/L 134* 135*  --  136   POTASSIUM mmol/L 3.5 3.4* 2.5* 2.1*   CHLORIDE mmol/L 96* 94*  --  93*   CO2 mmol/L 23.3 23.7  --  27.0   BUN mg/dL 34* 35*  --  29*   CREATININE mg/dL 1.84* 1.83*  --  1.89*   CALCIUM mg/dL 8.2* 8.2*  --  8.2*   BILIRUBIN mg/dL 2.9* 3.1*  --  2.7*   ALK PHOS U/L 294* 276*  --  215*   ALT (SGPT) U/L 74* 55*  --  36   AST (SGOT) U/L 141* 111*  --  73*   GLUCOSE mg/dL 101* 96  --  85       Estimated Creatinine Clearance: 45.3 mL/min (A) (by C-G formula based on SCr of 1.84 mg/dL (H)).    Results from last 7 days   Lab Units 08/13/24 0443 08/12/24 0619 08/11/24  0239   MAGNESIUM mg/dL 2.4 2.3 2.3   PHOSPHORUS mg/dL 2.5 3.4 4.0             Results from last 7 days   Lab Units 08/13/24 0443 08/12/24 0619 08/11/24 0239 08/10/24  1141   WBC 10*3/mm3 7.80 9.72 9.05 8.70   HEMOGLOBIN g/dL 9.5* 9.7* 8.8* 9.6*   PLATELETS 10*3/mm3 257 256 226 222       Results from last 7 days   Lab Units 08/10/24  1141   INR  3.30*       Assessment / Plan     ASSESSMENT:  Acute kidney injury.  Initially due to hypotension and decompensated systolic heart failure.  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  Creatinine at plateau 1.8.  Potassium low normal.  Alcohol abuse and withdrawal.  On [facility] protocol.  Alcoholic hepatitis.  Transaminases increasing.  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  Acute on chronic ischemic systolic cardiomyopathy.  Prior stents.  Prior history of hypertrophic cardiomyopathy with myomectomy and maze procedure as well as mitral valve replacement.  7.  Anemia low iron.  Check ferritin.  PLAN:  IV Lasix 40 mg every 12 hours x 2 doses  K-Dur 40 mequivalents once  Check ferritin on the blood in the lab    Thank you for involving us in the care of Javi DIAS  Espinoza.  Please feel free to call with any questions.    Katina Waldron MD  08/13/24  14:41 EDT    [facility]  [phone]    Please note that portions of this note were completed with a voice recognition program.

## 2024-08-13 NOTE — PROGRESS NOTES
"Nutrition Services    Patient Name:  Javi Doran  YOB: 1960  MRN: [id number]  Admit Date:  8/10/2024Assessment Date:  08/13/24    NUTRITION SCREENING      Reason for Encounter Nonhealing wound or pressure ulcer   Diagnosis/Problem Hypokalemia   PMH Alcohol abuse, CHF   PO Diet Diet: Regular/House, Cardiac; Healthy Heart (2-3 Na+); Fluid Consistency: Thin (IDDSI 0)   Supplements na   PO Intake % 50% per pt       Medications MAR reviewed by RD, noted diuretic    Labs  Listed below, reviewed   Physical Findings Alert, confused, 2+ edema   GI Function nausea   Skin Status Midline sacral spine PI stage 2       Height  Weight  BMI  Weight Trend     Height: 182.9 cm (72\")  Weight: 78 kg (171 lb 15.3 oz) (08/13/24 0524)  Body mass index is 23.32 kg/m².  Stable       Nutrition Problem (PES) Increased nutrient needs related to wound healing as evidence by loss of skin integrity        Intervention/Plan Miles BID   Boost BID     RD to follow up per protocol.     Estimated/Assessed Needs       Energy Requirements    Weight for Calculation 78kg   Method for Estimation  25-30 kcal/kg   EST Needs (kcal/day) 6681-1035       Protein Requirements    Weight for Calculation 78kg   EST Protein Needs (g/kg) 1.2 - 1.5 gm/kg   EST Daily Needs (g/day)        Fluid Requirements     Method for Estimation 1 mL/kcal    Estimated Needs (mL/day)       Results from last 7 days   Lab Units 08/13/24  0443 08/12/24  0619 08/11/24  2128 08/11/24  0239 08/10/24  2228 08/10/24  1141   SODIUM mmol/L 134* 135*  --  136   < > 134*   POTASSIUM mmol/L 3.5 3.4* 2.5* 2.1*   < > 1.8*   CHLORIDE mmol/L 96* 94*  --  93*   < > 89*   CO2 mmol/L 23.3 23.7  --  27.0   < > 30.0*   BUN mg/dL 34* 35*  --  29*   < > 21   CREATININE mg/dL 1.84* 1.83*  --  1.89*   < > 1.59*   CALCIUM mg/dL 8.2* 8.2*  --  8.2*   < > 8.6   BILIRUBIN mg/dL  --  3.1*  --  2.7*  --  2.6*   ALK PHOS U/L  --  276*  --  215*  --  219*   ALT (SGPT) U/L  --  55*  --  36  " --  37   AST (SGOT) U/L  --  111*  --  73*  --  77*   GLUCOSE mg/dL 101* 96  --  85   < > 132*    < > = values in this interval not displayed.     Results from last 7 days   Lab Units 08/13/24  0443 08/12/24  0619 08/11/24  0239   MAGNESIUM mg/dL 2.4 2.3 2.3   PHOSPHORUS mg/dL 2.5 3.4 4.0   HEMOGLOBIN g/dL 9.5* 9.7* 8.8*   HEMATOCRIT % 30.2* 30.1* 27.8*     Lab Results   Component Value Date    HGBA1C 5.2 01/03/2024         Electronically signed by:  Yamilka Anaya RD  08/13/24 11:26 EDT

## 2024-08-13 NOTE — PROGRESS NOTES
Reviewed chart and interviewed pt.   Pt sleeping upon my entry. Pt woke at sound of my voice.  Introduced self as KANDY therapist and role.   Pt states he has new AA meeting to attend in [place] so pt can go to AA meeting daily upon returning home to [place]. Pt does not have an active AA sponsor. Pt states that his AA sponsor is in [place]. Strongly encouraged to select a AA sponsor in [facility] and educated why. Pt given MARJAN hotline number , text number that replies and email of [facility] for KANDY resources /treatment in [place]. Pt sleepy. Access will continue to follow.

## 2024-08-13 NOTE — CASE MANAGEMENT/SOCIAL WORK
Discharge Planning Assessment  [facility]     Patient Name: Javi Doran  MRN: [id number]  Today's Date: 8/13/2024    Admit Date: 8/10/2024    Plan: Home with family to transport.   Discharge Needs Assessment       Row Name 08/13/24 1340       Living Environment    People in Home alone    Current Living Arrangements home    In the past 12 months has the electric, gas, oil, or water company threatened to shut off services in your home? No    Primary Care Provided by self    Provides Primary Care For no one    Family Caregiver if Needed child(mallory), adult    Quality of Family Relationships unable to assess       Resource/Environmental Concerns    Resource/Environmental Concerns none    Transportation Concerns none       Transportation Needs    In the past 12 months, has lack of transportation kept you from medical appointments or from getting medications? no    In the past 12 months, has lack of transportation kept you from meetings, work, or from getting things needed for daily living? No       Food Insecurity    Within the past 12 months, you worried that your food would run out before you got the money to buy more. Never true    Within the past 12 months, the food you bought just didn't last and you didn't have money to get more. Never true       Transition Planning    Patient/Family Anticipates Transition to home    Patient/Family Anticipated Services at Transition rehabilitation services    Transportation Anticipated family or friend will provide       Discharge Needs Assessment    Readmission Within the Last 30 Days no previous admission in last 30 days    Equipment Currently Used at Home none    Concerns to be Addressed denies needs/concerns at this time;discharge planning                   Discharge Plan       Row Name 08/13/24 1344       Plan    Plan Home with family to transport.    Patient/Family in Agreement with Plan yes    Plan Comments Spoke with patient at bedside. Introduced self and explained  CCP role. Verified face sheet and local pharmacy is [facility], patient choice to enroll in [facility]. Denies difficulty with medication cost/ management. Patient lives alone in 3rd floor apartment. Patient denies HH, DME, and SNF history. Patient plans to return home with family to transport. CCP to follow for discharge needs.                  Continued Care and Services - Admitted Since 8/10/2024    No active coordination exists for this encounter.       Expected Discharge Date and Time       Expected Discharge Date Expected Discharge Time    Aug 15, 2024            Demographic Summary       Row Name 08/13/24 1339       General Information    Admission Type inpatient    Required Notices Provided Important Message from Medicare    Referral Source admission list    Reason for Consult discharge planning    Preferred Language English                   Functional Status       Row Name 08/13/24 1339       Functional Status    Usual Activity Tolerance good    Current Activity Tolerance fair       Physical Activity    On average, how many days per week do you engage in moderate to strenuous exercise (like a brisk walk)? 5 days    On average, how many minutes do you engage in exercise at this level? 30 min    Number of minutes of exercise per week 150       Functional Status, IADL    Medications independent    Meal Preparation independent    Housekeeping independent    Laundry independent    Shopping independent       Mental Status    General Appearance WDL WDL       Employment/    Employment Status disabled                   Psychosocial    No documentation.                  Abuse/Neglect    No documentation.                  Legal       Row Name 08/13/24 3738       Financial Resource Strain    How hard is it for you to pay for the very basics like food, housing, medical care, and heating? Not very                   Substance Abuse    No documentation.                  Patient Forms    No documentation.                      Monique Morgan RN

## 2024-08-14 PROBLEM — E87.6 HYPOKALEMIA: Status: RESOLVED | Noted: 2024-08-10 | Resolved: 2024-08-14

## 2024-08-14 LAB
ALBUMIN SERPL-MCNC: 3.5 G/DL (ref 3.5–5.2)
ALBUMIN/GLOB SERPL: 1.3 G/DL
ALP SERPL-CCNC: 308 U/L (ref 39–117)
ALT SERPL W P-5'-P-CCNC: 94 U/L (ref 1–41)
ANION GAP SERPL CALCULATED.3IONS-SCNC: 15.3 MMOL/L (ref 5–15)
AST SERPL-CCNC: 170 U/L (ref 1–40)
BILIRUB SERPL-MCNC: 2.7 MG/DL (ref 0–1.2)
BUN SERPL-MCNC: 34 MG/DL (ref 8–23)
BUN/CREAT SERPL: 17.4 (ref 7–25)
CALCIUM SPEC-SCNC: 8.7 MG/DL (ref 8.6–10.5)
CHLORIDE SERPL-SCNC: 99 MMOL/L (ref 98–107)
CO2 SERPL-SCNC: 22.7 MMOL/L (ref 22–29)
CREAT SERPL-MCNC: 1.95 MG/DL (ref 0.76–1.27)
DEPRECATED RDW RBC AUTO: 60.4 FL (ref 37–54)
EGFRCR SERPLBLD CKD-EPI 2021: 37.9 ML/MIN/1.73
ERYTHROCYTE [DISTWIDTH] IN BLOOD BY AUTOMATED COUNT: 19.9 % (ref 12.3–15.4)
GLOBULIN UR ELPH-MCNC: 2.8 GM/DL
GLUCOSE SERPL-MCNC: 94 MG/DL (ref 65–99)
HCT VFR BLD AUTO: 32 % (ref 37.5–51)
HGB BLD-MCNC: 10.3 G/DL (ref 13–17.7)
MAGNESIUM SERPL-MCNC: 2.3 MG/DL (ref 1.6–2.4)
MCH RBC QN AUTO: 27 PG (ref 26.6–33)
MCHC RBC AUTO-ENTMCNC: 32.2 G/DL (ref 31.5–35.7)
MCV RBC AUTO: 83.8 FL (ref 79–97)
NT-PROBNP SERPL-MCNC: ABNORMAL PG/ML (ref 0–900)
PHOSPHATE SERPL-MCNC: 2.8 MG/DL (ref 2.5–4.5)
PLATELET # BLD AUTO: 237 10*3/MM3 (ref 140–450)
PMV BLD AUTO: 9.2 FL (ref 6–12)
POTASSIUM SERPL-SCNC: 4 MMOL/L (ref 3.5–5.2)
PROT SERPL-MCNC: 6.3 G/DL (ref 6–8.5)
QT INTERVAL: 593 MS
QTC INTERVAL: 598 MS
RBC # BLD AUTO: 3.82 10*6/MM3 (ref 4.14–5.8)
SODIUM SERPL-SCNC: 137 MMOL/L (ref 136–145)
WBC NRBC COR # BLD AUTO: 8.86 10*3/MM3 (ref 3.4–10.8)

## 2024-08-14 PROCEDURE — 25010000002 THIAMINE PER 100 MG: Performed by: HOSPITALIST

## 2024-08-14 PROCEDURE — 25010000002 FUROSEMIDE PER 20 MG: Performed by: INTERNAL MEDICINE

## 2024-08-14 PROCEDURE — 99232 SBSQ HOSP IP/OBS MODERATE 35: CPT | Performed by: NURSE PRACTITIONER

## 2024-08-14 PROCEDURE — 94799 UNLISTED PULMONARY SVC/PX: CPT

## 2024-08-14 PROCEDURE — 97116 GAIT TRAINING THERAPY: CPT

## 2024-08-14 PROCEDURE — 83735 ASSAY OF MAGNESIUM: CPT | Performed by: INTERNAL MEDICINE

## 2024-08-14 PROCEDURE — 84100 ASSAY OF PHOSPHORUS: CPT | Performed by: INTERNAL MEDICINE

## 2024-08-14 PROCEDURE — 83880 ASSAY OF NATRIURETIC PEPTIDE: CPT | Performed by: HOSPITALIST

## 2024-08-14 PROCEDURE — 94664 DEMO&/EVAL PT USE INHALER: CPT

## 2024-08-14 PROCEDURE — 94761 N-INVAS EAR/PLS OXIMETRY MLT: CPT

## 2024-08-14 PROCEDURE — 94760 N-INVAS EAR/PLS OXIMETRY 1: CPT

## 2024-08-14 PROCEDURE — 80053 COMPREHEN METABOLIC PANEL: CPT | Performed by: HOSPITALIST

## 2024-08-14 PROCEDURE — 85027 COMPLETE CBC AUTOMATED: CPT | Performed by: HOSPITALIST

## 2024-08-14 PROCEDURE — 93005 ELECTROCARDIOGRAM TRACING: CPT | Performed by: NURSE PRACTITIONER

## 2024-08-14 RX ORDER — AMIODARONE HYDROCHLORIDE 200 MG/1
100 TABLET ORAL DAILY
Status: DISCONTINUED | OUTPATIENT
Start: 2024-08-15 | End: 2024-08-15

## 2024-08-14 RX ORDER — TORSEMIDE 20 MG/1
40 TABLET ORAL DAILY
Status: DISCONTINUED | OUTPATIENT
Start: 2024-08-14 | End: 2024-08-15

## 2024-08-14 RX ADMIN — FUROSEMIDE 40 MG: 10 INJECTION, SOLUTION INTRAMUSCULAR; INTRAVENOUS at 03:21

## 2024-08-14 RX ADMIN — THIAMINE HYDROCHLORIDE 200 MG: 100 INJECTION, SOLUTION INTRAMUSCULAR; INTRAVENOUS at 13:02

## 2024-08-14 RX ADMIN — TORSEMIDE 40 MG: 20 TABLET ORAL at 12:52

## 2024-08-14 RX ADMIN — Medication 10 ML: at 09:16

## 2024-08-14 RX ADMIN — ARFORMOTEROL TARTRATE 15 MCG: 15 SOLUTION RESPIRATORY (INHALATION) at 19:55

## 2024-08-14 RX ADMIN — PANTOPRAZOLE SODIUM 40 MG: 40 TABLET, DELAYED RELEASE ORAL at 17:25

## 2024-08-14 RX ADMIN — PANTOPRAZOLE SODIUM 40 MG: 40 TABLET, DELAYED RELEASE ORAL at 06:36

## 2024-08-14 RX ADMIN — FOLIC ACID 1 MG: 1 TABLET ORAL at 09:15

## 2024-08-14 RX ADMIN — ARFORMOTEROL TARTRATE 15 MCG: 15 SOLUTION RESPIRATORY (INHALATION) at 08:19

## 2024-08-14 RX ADMIN — SPIRONOLACTONE 25 MG: 25 TABLET, FILM COATED ORAL at 09:15

## 2024-08-14 RX ADMIN — Medication 10 ML: at 21:10

## 2024-08-14 RX ADMIN — TIOTROPIUM BROMIDE INHALATION SPRAY 2 PUFF: 3.12 SPRAY, METERED RESPIRATORY (INHALATION) at 08:17

## 2024-08-14 RX ADMIN — SERTRALINE 100 MG: 100 TABLET, FILM COATED ORAL at 21:10

## 2024-08-14 RX ADMIN — Medication 1 TABLET: at 09:15

## 2024-08-14 RX ADMIN — THIAMINE HYDROCHLORIDE 200 MG: 100 INJECTION, SOLUTION INTRAMUSCULAR; INTRAVENOUS at 06:33

## 2024-08-14 RX ADMIN — RIVAROXABAN 15 MG: 15 TABLET, FILM COATED ORAL at 17:25

## 2024-08-14 RX ADMIN — AMIODARONE HYDROCHLORIDE 200 MG: 200 TABLET ORAL at 09:15

## 2024-08-14 RX ADMIN — METOPROLOL SUCCINATE 12.5 MG: 25 TABLET, EXTENDED RELEASE ORAL at 09:15

## 2024-08-14 RX ADMIN — BUSPIRONE HYDROCHLORIDE 15 MG: 15 TABLET ORAL at 21:10

## 2024-08-14 RX ADMIN — THIAMINE HYDROCHLORIDE 200 MG: 100 INJECTION, SOLUTION INTRAMUSCULAR; INTRAVENOUS at 21:10

## 2024-08-14 NOTE — PROGRESS NOTES
[facility] Progress Note      Patient Name: Javi Doran  : 1960  MRN: [id number]  Primary Care Physician:  Ginette Bryant MD  Date of admission: 8/10/2024    Subjective     Interval History:   Follow-up acute kidney injury.  Urine output 900+ not recorded.  Says he is short of breath with any exertion.  Minimal cough.  Chest x-ray with mild pulmonary edema and/or pneumonia.  Review of Systems:   As noted above    Objective     Vitals:   Temp:  [97.5 °F (36.4 °C)-97.7 °F (36.5 °C)] 97.7 °F (36.5 °C)  Heart Rate:  [61-75] 63  Resp:  [16-20] 18  BP: (102-138)/(76-90) 110/81  Flow (L/min):  [2] 2    Intake/Output Summary (Last 24 hours) at 2024 1020  Last data filed at 2024 0915  Gross per 24 hour   Intake 1020 ml   Output 1125 ml   Net -105 ml       Physical Exam:    General Appearance: Calm.  Awakens and answers questions but goes back to sleep quickly.  Received Ativan last at 10 PM last night.  Skin: warm and dry  HEENT: oral mucosa normal, nonicteric sclera  Neck: supple, no JVD  Lungs: Few scattered rales.  No wheezing.  Unlabored on 2 L nasal cannula.  Heart: RRR, normal S1 and S2  Abdomen: soft, nontender, nondistended. +bs  : no palpable bladder  Extremities: Trace lower extremity edema  Neuro: Calm.  Awakens but goes back to sleep quickly.  Scheduled Meds:     amiodarone, 200 mg, Oral, Daily  arformoterol, 15 mcg, Nebulization, BID - RT   And  tiotropium bromide monohydrate, 2 puff, Inhalation, Daily - RT  [Held by provider] atorvastatin, 80 mg, Oral, Nightly  folic acid, 1 mg, Oral, Daily  furosemide, 40 mg, Intravenous, Q12H  metoprolol succinate XL, 12.5 mg, Oral, Q24H  multivitamin with minerals, 1 tablet, Oral, Daily  pantoprazole, 40 mg, Oral, BID AC  rivaroxaban, 15 mg, Oral, Daily With Dinner  sertraline, 100 mg, Oral, Nightly  sodium chloride, 10 mL, Intravenous, Q12H  spironolactone, 25 mg, Oral, Daily  thiamine (B-1) IV,  200 mg, Intravenous, Q8H   Followed by  [START ON 8/17/2024] thiamine, 100 mg, Oral, Daily      IV Meds:        Results Reviewed:   I have personally reviewed the results from the time of this admission to 8/14/2024 10:20 EDT     Results from last 7 days   Lab Units 08/14/24  0615 08/13/24  0443 08/12/24  0619   SODIUM mmol/L 137 134* 135*   POTASSIUM mmol/L 4.0 3.5 3.4*   CHLORIDE mmol/L 99 96* 94*   CO2 mmol/L 22.7 23.3 23.7   BUN mg/dL 34* 34* 35*   CREATININE mg/dL 1.95* 1.84* 1.83*   CALCIUM mg/dL 8.7 8.2* 8.2*   BILIRUBIN mg/dL 2.7* 2.9* 3.1*   ALK PHOS U/L 308* 294* 276*   ALT (SGPT) U/L 94* 74* 55*   AST (SGOT) U/L 170* 141* 111*   GLUCOSE mg/dL 94 101* 96       Estimated Creatinine Clearance: 42.8 mL/min (A) (by C-G formula based on SCr of 1.95 mg/dL (H)).    Results from last 7 days   Lab Units 08/14/24  0615 08/13/24  0443 08/12/24  0619   MAGNESIUM mg/dL 2.3 2.4 2.3   PHOSPHORUS mg/dL 2.8 2.5 3.4             Results from last 7 days   Lab Units 08/14/24  0615 08/13/24  0443 08/12/24  0619 08/11/24  0239 08/10/24  1141   WBC 10*3/mm3 8.86 7.80 9.72 9.05 8.70   HEMOGLOBIN g/dL 10.3* 9.5* 9.7* 8.8* 9.6*   PLATELETS 10*3/mm3 237 257 256 226 222       Results from last 7 days   Lab Units 08/10/24  1141   INR  3.30*       Assessment / Plan     ASSESSMENT:  Acute kidney injury.  Initially due to hypotension and decompensated systolic heart failure.  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  Creatinine at plateau 1.8-1.9.  Slowly.  Change diuretic to p.o.  Potassium low normal.  Alcohol abuse and withdrawal.  On [facility] protocol.  Alcoholic hepatitis.  Transaminases increasing.  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  Acute on chronic ischemic systolic cardiomyopathy.  Prior stents.  Echo at [facility] 7/3/2024 with EF 30 to 35%, akinetic anteroseptal apical and anteroapical walls.  Bilateral pulmonary infiltrates.  Wonder if he could have aspiration pneumonia rather than true volume overload.  No  fever and white count normal.  Prior history of hypertrophic cardiomyopathy with myomectomy and maze procedure as well as mitral valve replacement.  7.  Anemia low iron.  Check ferritin.  PLAN:  Change to p.o. diuretic.  May need CT of the chest to better delineate the infiltrates.      Thank you for involving us in the care of Javi Doran.  Please feel free to call with any questions.    Katina Waldron MD  08/14/24  10:20 EDT    [facility]  [phone]    Please note that portions of this note were completed with a voice recognition program.

## 2024-08-14 NOTE — PLAN OF CARE
Goal Outcome Evaluation:  Plan of Care Reviewed With: patient        Progress: no change  Outcome Evaluation: Pt. A&O to person, place but anxious and setting of the bed alarm several times during the shift without getting out of bed.  Report of SOA and lasix given per MD order.  Report of anxiousness and CIWA 10 where ativan given x 1 during the shift.  VSS.  No requests at this time.  Call light within reach.

## 2024-08-14 NOTE — PROGRESS NOTES
Name: Javi Doran ADMIT: 8/10/2024   : 1960  PCP: Ginette Braynt MD    MRN: [id number] LOS: 3 days   AGE/SEX: 64 y.o. male  ROOM: [facility]     Subjective   Subjective   More or less unchanged today.  Complains of significant dyspnea with exertion and nurse states he gets severely short of breath       Objective   Objective   Vital Signs  Temp:  [97.7 °F (36.5 °C)-97.9 °F (36.6 °C)] 97.9 °F (36.6 °C)  Heart Rate:  [61-75] 62  Resp:  [16-20] 18  BP: (102-138)/(73-90) 104/73  SpO2:  [87 %-100 %] 96 %  on  Flow (L/min):  [2] 2;   Device (Oxygen Therapy): nasal cannula  Body mass index is 22.93 kg/m².  Physical Exam  Vitals reviewed.   Constitutional:       General: He is not in acute distress.     Appearance: He is well-developed. He is ill-appearing.      Comments: Drowsy   HENT:      Head: Normocephalic and atraumatic.   Eyes:      General: Scleral icterus present.   Cardiovascular:      Rate and Rhythm: Normal rate and regular rhythm.      Heart sounds: No murmur heard.  Pulmonary:      Effort: Pulmonary effort is normal. No respiratory distress.      Breath sounds: Normal breath sounds. No wheezing.   Abdominal:      General: There is distension.      Palpations: Abdomen is soft.      Tenderness: There is no abdominal tenderness.   Musculoskeletal:      Right lower leg: No edema.      Left lower leg: No edema.   Skin:     General: Skin is warm and dry.      Coloration: Skin is pale.      Findings: No rash.   Neurological:      Comments: No real tremor or asterixis       Results Review     I reviewed the patient's new clinical results.  Results from last 7 days   Lab Units 24  0615 243 24  0619 24  0239   WBC 10*3/mm3 8.86 7.80 9.72 9.05   HEMOGLOBIN g/dL 10.3* 9.5* 9.7* 8.8*   PLATELETS 10*3/mm3 237 257 256 226     Results from last 7 days   Lab Units 24  0615 24  0443 24  0619 24  2128 24  0239   SODIUM mmol/L 137 134*  "135*  --  136   POTASSIUM mmol/L 4.0 3.5 3.4* 2.5* 2.1*   CHLORIDE mmol/L 99 96* 94*  --  93*   CO2 mmol/L 22.7 23.3 23.7  --  27.0   BUN mg/dL 34* 34* 35*  --  29*   CREATININE mg/dL 1.95* 1.84* 1.83*  --  1.89*   GLUCOSE mg/dL 94 101* 96  --  85   EGFR mL/min/1.73 37.9* 40.7* 41.0*  --  39.4*     Results from last 7 days   Lab Units 08/14/24  0615 08/13/24  0443 08/12/24  0619 08/11/24  0239   ALBUMIN g/dL 3.5 3.5 3.6 3.6   BILIRUBIN mg/dL 2.7* 2.9* 3.1* 2.7*   ALK PHOS U/L 308* 294* 276* 215*   AST (SGOT) U/L 170* 141* 111* 73*   ALT (SGPT) U/L 94* 74* 55* 36     Results from last 7 days   Lab Units 08/14/24  0615 08/13/24  0443 08/12/24  0619 08/11/24  0239   CALCIUM mg/dL 8.7 8.2* 8.2* 8.2*   ALBUMIN g/dL 3.5 3.5 3.6 3.6   MAGNESIUM mg/dL 2.3 2.4 2.3 2.3   PHOSPHORUS mg/dL 2.8 2.5 3.4 4.0       No results found for: \"HGBA1C\", \"POCGLU\"    XR Chest PA & Lateral    Result Date: 8/13/2024  As described.  This report was finalized on 8/13/2024 2:50 PM by Dr. Dustin Huffman M.D on Workstation: [id number]       I have personally reviewed all medications:  Scheduled Medications  [Held by provider] amiodarone, 100 mg, Oral, Daily  arformoterol, 15 mcg, Nebulization, BID - RT   And  tiotropium bromide monohydrate, 2 puff, Inhalation, Daily - RT  folic acid, 1 mg, Oral, Daily  metoprolol succinate XL, 12.5 mg, Oral, Q24H  multivitamin with minerals, 1 tablet, Oral, Daily  pantoprazole, 40 mg, Oral, BID AC  rivaroxaban, 15 mg, Oral, Daily With Dinner  sertraline, 100 mg, Oral, Nightly  sodium chloride, 10 mL, Intravenous, Q12H  spironolactone, 25 mg, Oral, Daily  thiamine (B-1) IV, 200 mg, Intravenous, Q8H   Followed by  [START ON 8/17/2024] thiamine, 100 mg, Oral, Daily  torsemide, 40 mg, Oral, Daily    Infusions   Diet  Diet: Regular/House, Cardiac; Healthy Heart (2-3 Na+); Fluid Consistency: Thin (IDDSI 0)    I have personally reviewed:  [x]  Laboratory   []  Microbiology   [x]  Radiology   []  EKG/Telemetry  [x]  " Cardiology/Vascular   []  Pathology    []  Records       Assessment/Plan     Active Hospital Problems    Diagnosis  POA    EDMOND (acute kidney injury) [N17.9]  Yes    Alcohol withdrawal syndrome without complication [F10.930]  Yes    Essential hypertension [I10]  Yes    Anemia [D64.9]  Yes    Acute on chronic systolic congestive heart failure [I50.23]  Yes    Anxiety [F41.9]  Yes      Resolved Hospital Problems    Diagnosis Date Resolved POA    **Hypokalemia [E87.6] 08/14/2024 Yes       64 y.o. male with history of alcohol abuse and NICM admitted with dyspnea, EDMOND and Hypokalemia.    Dyspnea: CXR looks consistent with CHF but his symptoms seem out of proportion to the amount of fluid involved.  Agree with nephrology, checking CT at this point seems warranted.  Would love to give him contrast but cannot with his renal function.  He reports that he is compliant with Xarelto so probably less likely to have a clot anyway.  - Continue diuresis per nephrology    PAF status post ablation: Now with prolonged QT interval. Amiodarone held by cardiology, monitoring daily EKGs.  - Continue Xarelto    EDMOND stable: Diuresis ordered as above.  Tolerating Aldactone okay.    ETOH abuse/withdrawal-continue thiamine/folate/MVI.  Should be out of window for withdrawal so we will stop neurochecks and CIWA protocol.    Alcoholic hepatitis: LFTs continue to take upward slightly.  Continue to monitor daily  - Checked ammonia which is normal    Anemia- No overt bleeding though obviously some risk given ETOH use.  H&H stable  - Continue PPI and monitor daily  - Hold AC if any overt bleeding      SCD/Xarelto  Dispo: TBD.  Unlikely to be ready the next 48 hours    Reggie Minor MD  [facility]  08/14/24  15:56 EDT

## 2024-08-14 NOTE — NURSING NOTE
Access center follow up regarding ETOH: chart reviewed. Last CIWA of 10. Pt drowsy throughout the day yesterday and evening. Ativan dose early in the shift. KANDY therapist saw pt yesterday and provided resource information. Access continuing to follow for support and will supply additional resources as needed.

## 2024-08-14 NOTE — PLAN OF CARE
Goal Outcome Evaluation:  Plan of Care Reviewed With: patient        Progress: improving  Outcome Evaluation: VSS on 2L NC. AOx3, forgetful. Still c/o SOA and MD EUGENE aware. CT chest ordered. I&Os. Daily weight. Denies pain. Up to chair x1 assist. Bed alarm on.

## 2024-08-14 NOTE — PROGRESS NOTES
[facility]  Cardiology Progress Note    Patient Identification:  Name: Javi Doran  Age: 64 y.o.  Sex: male  :  1960  MRN: [id number]                 Follow Up / Chief Complaint: follow up for chf    Interval History:CIWA protocol. Resting in bed.  States he is having some shortness of breath.  Nephrology adjusted medications       Objective:    Past Medical History:  Past Medical History:   Diagnosis Date    Alcoholism     Atrial fibrillation     Constipation     Depression     Depression with anxiety     Obstructive hypertrophic cardiomyopathy     Persistent insomnia     Solitary pulmonary nodule on lung CT      Past Surgical History:  Past Surgical History:   Procedure Laterality Date    ADENOIDECTOMY      CARDIAC SURGERY      HEMORRHOIDECTOMY      OTHER SURGICAL HISTORY      PTCA: DIAGNOSTIC CATH CORONARY DOMINANCE    OTHER SURGICAL HISTORY      TONSILLECTOMY WITH ADENOIDECTOMY    TONSILLECTOMY          Social History:   Social History     Tobacco Use    Smoking status: Every Day     Current packs/day: 1.00     Types: Cigarettes    Smokeless tobacco: Not on file   Substance Use Topics    Alcohol use: Yes     Comment: former alcoholic. drinks occassionally.      Family History:  Family History   Problem Relation Age of Onset    Cardiomyopathy Brother           Allergies:  No Known Allergies  Scheduled Meds:  amiodarone, 200 mg, Daily  arformoterol, 15 mcg, BID - RT   And  tiotropium bromide monohydrate, 2 puff, Daily - RT  folic acid, 1 mg, Daily  metoprolol succinate XL, 12.5 mg, Q24H  multivitamin with minerals, 1 tablet, Daily  pantoprazole, 40 mg, BID AC  rivaroxaban, 15 mg, Daily With Dinner  sertraline, 100 mg, Nightly  sodium chloride, 10 mL, Q12H  spironolactone, 25 mg, Daily  thiamine (B-1) IV, 200 mg, Q8H   Followed by  [START ON 2024] thiamine, 100 mg, Daily  torsemide, 40 mg, Daily            INTAKE AND OUTPUT:    Intake/Output Summary (Last 24 hours) at 2024  1206  Last data filed at 2024 1114  Gross per 24 hour   Intake 1020 ml   Output 1325 ml   Net -305 ml       Review of Systems:   GI: no n/v  Cardiac: no cp  Pulmonary: +shob     Constitutional:  Temp:  [97.5 °F (36.4 °C)-97.7 °F (36.5 °C)] 97.7 °F (36.5 °C)  Heart Rate:  [61-75] 63  Resp:  [16-20] 18  BP: (102-138)/(76-90) 110/81    Physical Exam:  General:  Appears in no acute distress, resting in bed.  Eyes: eom normal and no conjunctival drainage  HEENT:  No JVD. Thyroid not visibly enlarged. No mucosal pallor or cyanosis  Respiratory: Respirations regular and unlabored at rest. BBS with good air entry in all fields. No crackles, rubs or wheezes auscultated  Cardiovascular: S1S2 Regular rate and rhythm. No murmur, rub or gallop. No carotid bruits. DP/PT pulses     . No pretibial pitting edema  Gastrointestinal: Abdomen soft, flat, non tender. Bowel sounds present. No hepatosplenomegaly. No ascites  Musculoskeletal: PRETTY x4. No abnormal movements  Extremities: No digital clubbing or cyanosis  Skin:  Skin warm and dry to touch. No rashes    Neuro: AAO x3 CN II-XII grossly intact  Psych: Mood and affect normal, pleasant and cooperative          Cardiographics  Telemetry:     EC/2024  Physician Conclusions   Any valve disease noted in the report is non-rheumatic unless otherwise specifically noted.   Summary:                 The ejection fraction biplane was calculated at 34%.                            Mild left ventricular hypertrophy.                            The estimated ejection fraction is 30-35%.Anteroseptal, apical and anteroapical wall is akinetic to                            dyskinetic.                            Mitral valve tissue Doppler E/E'' ratio consistent with elevated left atrial pressures.                            Intravenous contrast was used to enhance endocardial border definition.                            Mild to moderately dilated left atrium.                             The right ventricular chamber size and systolic function are within normal limits.                            There is mild right atrial enlargement.                            The aortic valve appears grossly normal in structure . There is mild aortic regurgitation.                            The mitral valve appears mildly thickened. There is trace to mild mitral regurgitation.                            Right ventricular systolic pressure of 29 mmHg.                            Tricuspid valve is structurally normal.                            Trace-to-mild tricuspid regurgitation.                            The aortic root appears normal.                            There is no dilatation of the ascending aorta.                            No evidence of pericardial effusion.   Lab Review   Results from last 7 days   Lab Units 08/10/24  1527 08/10/24  1141   HSTROP T ng/L 40* 50*     Results from last 7 days   Lab Units 08/14/24  0615   MAGNESIUM mg/dL 2.3     Results from last 7 days   Lab Units 08/14/24  0615   SODIUM mmol/L 137   POTASSIUM mmol/L 4.0   BUN mg/dL 34*   CREATININE mg/dL 1.95*   CALCIUM mg/dL 8.7     @[id number]@  Results from last 7 days   Lab Units 08/14/24  0615 08/13/24  0443 08/12/24  0619   WBC 10*3/mm3 8.86 7.80 9.72   HEMOGLOBIN g/dL 10.3* 9.5* 9.7*   HEMATOCRIT % 32.0* 30.2* 30.1*   PLATELETS 10*3/mm3 237 257 256     Results from last 7 days   Lab Units 08/10/24  1141   INR  3.30*   APTT seconds 46.6*         Assessment:  -Acute on chronic systolic CHF: Nephrology diuresing. Continue spirolactone, and BB  - History of hypertrophic cardiomyopathy/myomectomy/Maze procedure/MVR  -PAF: Hold amiodarone (defer to below), continue anticoagulated on Xarelto.  CAD with history of multiple stents: No chest pain.  Troponins trending down.  Cardiac cath with noted widely patent stents in the circumflex and RCA with moderate proximal LAD.  60% in 2023.  -Chronic alcohol abuse: On CIWA protocol.   "Defer to attending.    -Severe hypokalemia: Today 3.5.  -CKD  -Hypertension: Stable.  -LBBB    Plan:  - Bp and HR stable.   - ecg SR with . I will do daily ecg.  Discussed with Dr. Kilpatrick and hold amiodarone and stop zofran due to prolong QTc.  Please refrain from using medications that could prolong Qtc. HR stable  - Nephrology replacing diuresing and electrolytes.    )8/14/2024  LELA Wade/Transcription:   \"Dictated utilizing Dragon dictation\".     "

## 2024-08-14 NOTE — PLAN OF CARE
Goal Outcome Evaluation:  Plan of Care Reviewed With: patient        Progress: improving  Outcome Evaluation: Pt tolerated treatment fair this date. Required SBA for bed mobility, then CGA-min A to stand. Pt ambulated 20ft w/ Rw and CGA-min A, slightly unsteady and limited d/t fatigue, dizziness and SOA. Pt declined sitting in the chair during today's session, though was encouraged to sit up w/ nsg assist later.      Anticipated Discharge Disposition (PT): skilled nursing facility

## 2024-08-14 NOTE — THERAPY TREATMENT NOTE
From: Leonora Norman  To: Fazal Davis  Sent: 11/20/2020 9:15 AM CST  Subject: Non-Urgent Medical Question    Hi Dr Davis,  I went to refill my Venlafaxine through [facility] and it's saying it needs your approval. Are you able to send over that prescription? I believe we've been doing the (2) 150mg tablets in the morning.   Leonora   Patient Name: Javi Doran  : 1960    MRN: [id number]                              Today's Date: 2024       Admit Date: 8/10/2024    Visit Dx:     ICD-10-CM ICD-9-CM   1. Hypokalemia  E87.6 276.8   2. EDMOND (acute kidney injury)  N17.9 584.9   3. Lower extremity edema  R60.0 782.3   4. Anemia, unspecified type  D64.9 285.9   5. Elevated troponin  R79.89 790.6     Patient Active Problem List   Diagnosis    Elevated alanine aminotransferase (ALT) level    Anemia    Anxiety    Coronary arteriosclerosis in native artery    Cobalamin deficiency    Mass of breast    Chronic kidney disease    Acute on chronic systolic congestive heart failure    Constipation    Gastroesophageal reflux disease    Fatigue    Gastric ulcer    Gynecomastia    History of alcohol abuse    Hyperlipidemia    Hypogonadism in male    Major depressive disorder, recurrent episode    Primary insomnia    Temporary cerebral vascular dysfunction    Essential hypertension    Benzodiazepine misuse    Hypokalemia    EDMOND (acute kidney injury)    Alcohol withdrawal syndrome without complication     Past Medical History:   Diagnosis Date    Alcoholism     Atrial fibrillation     Constipation     Depression     Depression with anxiety     Obstructive hypertrophic cardiomyopathy     Persistent insomnia     Solitary pulmonary nodule on lung CT      Past Surgical History:   Procedure Laterality Date    ADENOIDECTOMY      CARDIAC SURGERY      HEMORRHOIDECTOMY      OTHER SURGICAL HISTORY      PTCA: DIAGNOSTIC CATH CORONARY DOMINANCE    OTHER SURGICAL HISTORY      TONSILLECTOMY WITH ADENOIDECTOMY    TONSILLECTOMY        General Information       Row Name 24 1334          Physical Therapy Time and Intention    Document Type therapy note (daily note)  -     Mode of Treatment physical therapy  -       Row Name 24 1334          General Information    Existing Precautions/Restrictions fall;oxygen therapy device and L/min  -       Row Name  08/14/24 1334          Cognition    Orientation Status (Cognition) oriented to;person;place  -               User Key  (r) = Recorded By, (t) = Taken By, (c) = Cosigned By      Initials Name Provider Type    Renée Farley PTA Physical Therapist Assistant                   Mobility       Row Name 08/14/24 1334          Bed Mobility    Bed Mobility supine-sit;sit-supine  -     Supine-Sit [place] (Bed Mobility) standby assist  -     Sit-Supine [place] (Bed Mobility) standby assist  -     Assistive Device (Bed Mobility) bed rails;head of bed elevated  -[facility] Name 08/14/24 1334          Sit-Stand Transfer    Sit-Stand [place] (Transfers) contact guard;minimum assist (75% patient effort)  -[facility] Name 08/14/24 1334          Gait/Stairs (Locomotion)    [place] Level (Gait) contact guard;minimum assist (75% patient effort)  -     Assistive Device (Gait) walker, front-wheeled  -     Patient was able to Ambulate yes  -SM     Distance in Feet (Gait) 20  -SM     Deviations/Abnormal Patterns (Gait) nita decreased;stride length decreased  -SM     Bilateral Gait Deviations forward flexed posture  -SM     Comment, (Gait/Stairs) limited d/t fatigue, SOA and slight dizziness  -SM               User Key  (r) = Recorded By, (t) = Taken By, (c) = Cosigned By      Initials Name Provider Type    Renée Farley PTA Physical Therapist Assistant                   Obj/Interventions       Row Name 08/14/24 1335          Motor Skills    Therapeutic Exercise --  seated AP and LAQ x10 reps  -               User Key  (r) = Recorded By, (t) = Taken By, (c) = Cosigned By      Initials Name Provider Type    Renée Farley PTA Physical Therapist Assistant                   Goals/Plan    No documentation.                  Clinical Impression       Row Name 08/14/24 1337          Pain    Pretreatment Pain Rating 0/10 - no pain  -     Posttreatment Pain Rating 0/10 - no pain   -       Row Name 08/14/24 1337          Positioning and Restraints    Pre-Treatment Position in bed  -     Post Treatment Position bed  -SM     In Bed supine;call light within reach;encouraged to call for assist;exit alarm on  -               User Key  (r) = Recorded By, (t) = Taken By, (c) = Cosigned By      Initials Name Provider Type     Renée Aldrich PTA Physical Therapist Assistant                   Outcome Measures       Row Name 08/14/24 1340 08/14/24 0915       How much help from another person do you currently need...    Turning from your back to your side while in flat bed without using bedrails? 3  - 4  -LH    Moving from lying on back to sitting on the side of a flat bed without bedrails? 3  - 4  -LH    Moving to and from a bed to a chair (including a wheelchair)? 3  - 4  -LH    Standing up from a chair using your arms (e.g., wheelchair, bedside chair)? 3  - 3  -LH    Climbing 3-5 steps with a railing? 2  - 3  -LH    To walk in hospital room? 3  - 3  -    AM-PAC 6 Clicks Score (PT) 17  - 21  -    Highest Level of Mobility Goal 5 --> Static standing  - 6 --> Walk 10 steps or more  -      Row Name 08/14/24 1340          Functional Assessment    Outcome Measure Options AM-PAC 6 Clicks Basic Mobility (PT)  -               User Key  (r) = Recorded By, (t) = Taken By, (c) = Cosigned By      Initials Name Provider Type    Renée Farley PTA Physical Therapist Assistant     Brittany Goodman RN Registered Nurse                                 Physical Therapy Education       Title: PT OT SLP Therapies (Done)       Topic: Physical Therapy (Done)       Point: Mobility training (Done)       Learning Progress Summary             Patient Acceptance, E,TB,D, VU,NR by  at 8/14/2024 1340    Acceptance, E,TB,D, VU,NR by  at 8/12/2024 1558    Acceptance, E, VU,NR by  at 8/11/2024 1209                         Point: Home exercise program (Done)       Learning Progress  Summary             Patient Acceptance, E,TB,D, VU,NR by  at 8/14/2024 1340    Acceptance, E,TB,D, VU,NR by  at 8/12/2024 1558                         Point: Body mechanics (Done)       Learning Progress Summary             Patient Acceptance, E,TB,D, VU,NR by  at 8/14/2024 1340    Acceptance, E,TB,D, VU,NR by  at 8/12/2024 1558    Acceptance, E, VU,NR by  at 8/11/2024 1209                         Point: Precautions (Done)       Learning Progress Summary             Patient Acceptance, E,TB,D, VU,NR by  at 8/14/2024 1340    Acceptance, E,TB,D, VU,NR by  at 8/12/2024 1558    Acceptance, E, VU,NR by  at 8/11/2024 1209                                         User Key       Initials Effective Dates Name Provider Type Discipline     03/07/18 -  Renée Aldrich PTA Physical Therapist Assistant PT     10/25/19 -  Sierra Jamison PT Physical Therapist PT                  PT Recommendation and Plan     Plan of Care Reviewed With: patient  Progress: improving  Outcome Evaluation: Pt tolerated treatment fair this date. Required SBA for bed mobility, then CGA-min A to stand. Pt ambulated 20ft w/ Rw and CGA-min A, slightly unsteady and limited d/t fatigue, dizziness and SOA. Pt declined sitting in the chair during today's session, though was encouraged to sit up w/ nsg assist later.     Time Calculation:         PT Charges       Row Name 08/14/24 5774             Time Calculation    Start Time 1131  -      Stop Time 1150  -      Time Calculation (min) 19 min  -      PT Received On 08/14/24  -      PT - Next Appointment 08/15/24  -                User Key  (r) = Recorded By, (t) = Taken By, (c) = Cosigned By      Initials Name Provider Type     Renée Aldrich PTA Physical Therapist Assistant                  Therapy Charges for Today       Code Description Service Date Service Provider Modifiers Qty    [id number] HC GAIT TRAINING EA 15 MIN 8/14/2024 Renée Aldrich PTA GP 1             PT G-Codes  Outcome Measure Options: AM-PAC 6 Clicks Basic Mobility (PT)  AM-PAC 6 Clicks Score (PT): 17  PT Discharge Summary  Anticipated Discharge Disposition (PT): skilled nursing facility    Renée Aldrich, PTA  8/14/2024

## 2024-08-15 ENCOUNTER — APPOINTMENT (OUTPATIENT)
Dept: CT IMAGING | Facility: HOSPITAL | Age: 64
DRG: 682 | End: 2024-08-15
Payer: MEDICARE

## 2024-08-15 ENCOUNTER — APPOINTMENT (OUTPATIENT)
Dept: CARDIOLOGY | Facility: HOSPITAL | Age: 64
DRG: 682 | End: 2024-08-15
Payer: MEDICARE

## 2024-08-15 LAB
ALBUMIN SERPL-MCNC: 3.7 G/DL (ref 3.5–5.2)
ALBUMIN/GLOB SERPL: 1.5 G/DL
ALP SERPL-CCNC: 315 U/L (ref 39–117)
ALT SERPL W P-5'-P-CCNC: 93 U/L (ref 1–41)
AMMONIA BLD-SCNC: 26 UMOL/L (ref 16–60)
ANION GAP SERPL CALCULATED.3IONS-SCNC: 13 MMOL/L (ref 5–15)
ARTERIAL PATENCY WRIST A: ABNORMAL
AST SERPL-CCNC: 145 U/L (ref 1–40)
ATMOSPHERIC PRESS: 747.2 MMHG
BASE EXCESS BLDA CALC-SCNC: 2 MMOL/L (ref 0–2)
BDY SITE: ABNORMAL
BH CV ECHO MEAS - EDV(MOD-SP2): 209 ML
BH CV ECHO MEAS - EDV(MOD-SP4): 155 ML
BH CV ECHO MEAS - EF(MOD-BP): 54.4 %
BH CV ECHO MEAS - EF(MOD-SP2): 57.4 %
BH CV ECHO MEAS - EF(MOD-SP4): 50.3 %
BH CV ECHO MEAS - ESV(MOD-SP2): 89 ML
BH CV ECHO MEAS - ESV(MOD-SP4): 77 ML
BH CV ECHO MEAS - LAT PEAK E' VEL: 9.6 CM/SEC
BH CV ECHO MEAS - LV DIASTOLIC VOL/BSA (35-75): 79.8 CM2
BH CV ECHO MEAS - LV SYSTOLIC VOL/BSA (12-30): 39.6 CM2
BH CV ECHO MEAS - MED PEAK E' VEL: 3.1 CM/SEC
BH CV ECHO MEAS - MV DEC SLOPE: 496.4 CM/SEC2
BH CV ECHO MEAS - MV DEC TIME: 0.17 SEC
BH CV ECHO MEAS - MV E MAX VEL: 94.5 CM/SEC
BH CV ECHO MEAS - MV MAX PG: 4.2 MMHG
BH CV ECHO MEAS - MV MEAN PG: 0.99 MMHG
BH CV ECHO MEAS - MV P1/2T: 64.1 MSEC
BH CV ECHO MEAS - MV V2 VTI: 29.2 CM
BH CV ECHO MEAS - MVA(P1/2T): 3.4 CM2
BH CV ECHO MEAS - SV(MOD-SP2): 120 ML
BH CV ECHO MEAS - SV(MOD-SP4): 78 ML
BH CV ECHO MEAS - SVI(MOD-SP2): 61.8 ML/M2
BH CV ECHO MEAS - SVI(MOD-SP4): 40.1 ML/M2
BH CV ECHO MEASUREMENTS AVERAGE E/E' RATIO: 14.88
BH CV XLRA - TDI S': 7.5 CM/SEC
BILIRUB SERPL-MCNC: 2.5 MG/DL (ref 0–1.2)
BUN SERPL-MCNC: 35 MG/DL (ref 8–23)
BUN/CREAT SERPL: 17.3 (ref 7–25)
CALCIUM SPEC-SCNC: 8.8 MG/DL (ref 8.6–10.5)
CHLORIDE SERPL-SCNC: 98 MMOL/L (ref 98–107)
CO2 BLDA-SCNC: 27.4 MMOL/L (ref 23–27)
CO2 SERPL-SCNC: 26 MMOL/L (ref 22–29)
CREAT SERPL-MCNC: 2.02 MG/DL (ref 0.76–1.27)
EGFRCR SERPLBLD CKD-EPI 2021: 36.4 ML/MIN/1.73
GLOBULIN UR ELPH-MCNC: 2.5 GM/DL
GLUCOSE SERPL-MCNC: 88 MG/DL (ref 65–99)
HCO3 BLDA-SCNC: 26.2 MMOL/L (ref 22–28)
HEMODILUTION: NO
MAGNESIUM SERPL-MCNC: 2.2 MG/DL (ref 1.6–2.4)
MODALITY: ABNORMAL
PCO2 BLDA: 38.7 MM HG (ref 35–45)
PH BLDA: 7.44 PH UNITS (ref 7.35–7.45)
PHOSPHATE SERPL-MCNC: 2.5 MG/DL (ref 2.5–4.5)
PO2 BLDA: 59.9 MM HG (ref 80–100)
POTASSIUM SERPL-SCNC: 3.1 MMOL/L (ref 3.5–5.2)
POTASSIUM SERPL-SCNC: 3.5 MMOL/L (ref 3.5–5.2)
PROT SERPL-MCNC: 6.2 G/DL (ref 6–8.5)
QT INTERVAL: 592 MS
QTC INTERVAL: 602 MS
SAO2 % BLDCOA: 91.5 % (ref 92–98.5)
SODIUM SERPL-SCNC: 137 MMOL/L (ref 136–145)
TOTAL RATE: 20 BREATHS/MINUTE

## 2024-08-15 PROCEDURE — 93308 TTE F-UP OR LMTD: CPT

## 2024-08-15 PROCEDURE — 82803 BLOOD GASES ANY COMBINATION: CPT

## 2024-08-15 PROCEDURE — 94664 DEMO&/EVAL PT USE INHALER: CPT

## 2024-08-15 PROCEDURE — 83735 ASSAY OF MAGNESIUM: CPT | Performed by: INTERNAL MEDICINE

## 2024-08-15 PROCEDURE — 93325 DOPPLER ECHO COLOR FLOW MAPG: CPT | Performed by: INTERNAL MEDICINE

## 2024-08-15 PROCEDURE — 82140 ASSAY OF AMMONIA: CPT | Performed by: INTERNAL MEDICINE

## 2024-08-15 PROCEDURE — 84132 ASSAY OF SERUM POTASSIUM: CPT | Performed by: INTERNAL MEDICINE

## 2024-08-15 PROCEDURE — 99232 SBSQ HOSP IP/OBS MODERATE 35: CPT | Performed by: INTERNAL MEDICINE

## 2024-08-15 PROCEDURE — 93321 DOPPLER ECHO F-UP/LMTD STD: CPT

## 2024-08-15 PROCEDURE — 94799 UNLISTED PULMONARY SVC/PX: CPT

## 2024-08-15 PROCEDURE — 94761 N-INVAS EAR/PLS OXIMETRY MLT: CPT

## 2024-08-15 PROCEDURE — 25510000001 PERFLUTREN 6.52 MG/ML SUSPENSION 2 ML VIAL: Performed by: INTERNAL MEDICINE

## 2024-08-15 PROCEDURE — 25010000002 THIAMINE PER 100 MG: Performed by: HOSPITALIST

## 2024-08-15 PROCEDURE — 84100 ASSAY OF PHOSPHORUS: CPT | Performed by: INTERNAL MEDICINE

## 2024-08-15 PROCEDURE — 97116 GAIT TRAINING THERAPY: CPT

## 2024-08-15 PROCEDURE — 80053 COMPREHEN METABOLIC PANEL: CPT | Performed by: HOSPITALIST

## 2024-08-15 PROCEDURE — 93308 TTE F-UP OR LMTD: CPT | Performed by: INTERNAL MEDICINE

## 2024-08-15 PROCEDURE — 94760 N-INVAS EAR/PLS OXIMETRY 1: CPT

## 2024-08-15 PROCEDURE — 93005 ELECTROCARDIOGRAM TRACING: CPT | Performed by: NURSE PRACTITIONER

## 2024-08-15 PROCEDURE — 36600 WITHDRAWAL OF ARTERIAL BLOOD: CPT

## 2024-08-15 PROCEDURE — 93321 DOPPLER ECHO F-UP/LMTD STD: CPT | Performed by: INTERNAL MEDICINE

## 2024-08-15 PROCEDURE — 71250 CT THORAX DX C-: CPT

## 2024-08-15 PROCEDURE — 93325 DOPPLER ECHO COLOR FLOW MAPG: CPT

## 2024-08-15 RX ORDER — POTASSIUM CHLORIDE 750 MG/1
20 TABLET, FILM COATED, EXTENDED RELEASE ORAL ONCE
Status: COMPLETED | OUTPATIENT
Start: 2024-08-15 | End: 2024-08-15

## 2024-08-15 RX ORDER — POTASSIUM CHLORIDE 750 MG/1
40 TABLET, FILM COATED, EXTENDED RELEASE ORAL ONCE
Status: COMPLETED | OUTPATIENT
Start: 2024-08-15 | End: 2024-08-15

## 2024-08-15 RX ORDER — IPRATROPIUM BROMIDE AND ALBUTEROL SULFATE 2.5; .5 MG/3ML; MG/3ML
3 SOLUTION RESPIRATORY (INHALATION)
Status: DISCONTINUED | OUTPATIENT
Start: 2024-08-15 | End: 2024-08-19 | Stop reason: HOSPADM

## 2024-08-15 RX ORDER — ARFORMOTEROL TARTRATE 15 UG/2ML
15 SOLUTION RESPIRATORY (INHALATION)
Status: DISCONTINUED | OUTPATIENT
Start: 2024-08-15 | End: 2024-08-15

## 2024-08-15 RX ORDER — BUDESONIDE 0.5 MG/2ML
0.5 INHALANT ORAL
Status: DISCONTINUED | OUTPATIENT
Start: 2024-08-15 | End: 2024-08-17

## 2024-08-15 RX ADMIN — PANTOPRAZOLE SODIUM 40 MG: 40 TABLET, DELAYED RELEASE ORAL at 06:33

## 2024-08-15 RX ADMIN — SPIRONOLACTONE 25 MG: 25 TABLET, FILM COATED ORAL at 09:03

## 2024-08-15 RX ADMIN — FOLIC ACID 1 MG: 1 TABLET ORAL at 09:04

## 2024-08-15 RX ADMIN — THIAMINE HYDROCHLORIDE 200 MG: 100 INJECTION, SOLUTION INTRAMUSCULAR; INTRAVENOUS at 13:56

## 2024-08-15 RX ADMIN — THIAMINE HYDROCHLORIDE 200 MG: 100 INJECTION, SOLUTION INTRAMUSCULAR; INTRAVENOUS at 06:34

## 2024-08-15 RX ADMIN — Medication 1 TABLET: at 09:03

## 2024-08-15 RX ADMIN — SERTRALINE 100 MG: 100 TABLET, FILM COATED ORAL at 20:08

## 2024-08-15 RX ADMIN — POTASSIUM CHLORIDE 20 MEQ: 750 TABLET, EXTENDED RELEASE ORAL at 13:56

## 2024-08-15 RX ADMIN — ALBUTEROL SULFATE 2.5 MG: 2.5 SOLUTION RESPIRATORY (INHALATION) at 01:07

## 2024-08-15 RX ADMIN — POTASSIUM CHLORIDE 40 MEQ: 750 TABLET, EXTENDED RELEASE ORAL at 09:03

## 2024-08-15 RX ADMIN — BUDESONIDE 0.5 MG: 0.5 INHALANT ORAL at 19:49

## 2024-08-15 RX ADMIN — RIVAROXABAN 15 MG: 15 TABLET, FILM COATED ORAL at 17:23

## 2024-08-15 RX ADMIN — PANTOPRAZOLE SODIUM 40 MG: 40 TABLET, DELAYED RELEASE ORAL at 17:23

## 2024-08-15 RX ADMIN — ARFORMOTEROL TARTRATE 15 MCG: 15 SOLUTION RESPIRATORY (INHALATION) at 07:33

## 2024-08-15 RX ADMIN — PERFLUTREN 2 ML: 6.52 INJECTION, SUSPENSION INTRAVENOUS at 15:38

## 2024-08-15 RX ADMIN — Medication 10 ML: at 20:08

## 2024-08-15 RX ADMIN — Medication 10 ML: at 09:04

## 2024-08-15 RX ADMIN — TIOTROPIUM BROMIDE INHALATION SPRAY 2 PUFF: 3.12 SPRAY, METERED RESPIRATORY (INHALATION) at 07:42

## 2024-08-15 RX ADMIN — IPRATROPIUM BROMIDE AND ALBUTEROL SULFATE 3 ML: .5; 3 SOLUTION RESPIRATORY (INHALATION) at 19:43

## 2024-08-15 RX ADMIN — METOPROLOL SUCCINATE 12.5 MG: 25 TABLET, EXTENDED RELEASE ORAL at 09:04

## 2024-08-15 RX ADMIN — THIAMINE HYDROCHLORIDE 200 MG: 100 INJECTION, SOLUTION INTRAMUSCULAR; INTRAVENOUS at 22:11

## 2024-08-15 RX ADMIN — ARFORMOTEROL TARTRATE 15 MCG: 15 SOLUTION RESPIRATORY (INHALATION) at 19:55

## 2024-08-15 NOTE — PLAN OF CARE
Alert x 3. Restless, buspar given. Assist x 1 bedside commode. Voids urinal. New mepilex to coccyx.       Problem: Adult Inpatient Plan of Care  Goal: Absence of Hospital-Acquired Illness or Injury  Intervention: Prevent Skin Injury  Recent Flowsheet Documentation  Taken 8/15/2024 0555 by Angela Parmar RN  Body Position:   right   side-lying  Taken 8/15/2024 0404 by Angela Parmar RN  Body Position: head facing, right  Taken 8/15/2024 0146 by Angela Parmar RN  Body Position: sitting up in bed  Taken 8/14/2024 2349 by Angela Parmar RN  Body Position:   left   side-lying  Skin Protection: adhesive use limited  Taken 8/14/2024 2330 by Angela Parmar RN  Body Position:   turned   left  Taken 8/14/2024 2155 by Angela Parmar RN  Body Position: sitting up in bed  Taken 8/14/2024 2005 by Angela Parmar RN  Body Position: position changed independently  Skin Protection:   adhesive use limited   incontinence pads utilized   Goal Outcome Evaluation:

## 2024-08-15 NOTE — PLAN OF CARE
Problem: Adult Inpatient Plan of Care  Goal: Plan of Care Review  Outcome: Ongoing, Progressing  Goal: Patient-Specific Goal (Individualized)  Outcome: Ongoing, Progressing  Goal: Absence of Hospital-Acquired Illness or Injury  Outcome: Ongoing, Progressing  Intervention: Prevent Skin Injury  Recent Flowsheet Documentation  Taken 8/15/2024 0940 by Ivory Pathak RN  Skin Protection: adhesive use limited  Intervention: Prevent and Manage VTE (Venous Thromboembolism) Risk  Recent Flowsheet Documentation  Taken 8/15/2024 0940 by Ivory Pathak RN  Range of Motion: active ROM (range of motion) encouraged  Intervention: Prevent Infection  Recent Flowsheet Documentation  Taken 8/15/2024 0940 by Ivory Pathak RN  Infection Prevention:   environmental surveillance performed   hand hygiene promoted   single patient room provided  Goal: Optimal Comfort and Wellbeing  Outcome: Ongoing, Progressing  Intervention: Provide Person-Centered Care  Recent Flowsheet Documentation  Taken 8/15/2024 0940 by Ivory Pathak RN  Trust Relationship/Rapport: care explained  Goal: Readiness for Transition of Care  Outcome: Ongoing, Progressing     Problem: Hypertension Comorbidity  Goal: Blood Pressure in Desired Range  Outcome: Ongoing, Progressing  Intervention: Maintain Blood Pressure Management  Recent Flowsheet Documentation  Taken 8/15/2024 0940 by Ivory Pathak RN  Medication Review/Management: medications reviewed     Problem: Fall Injury Risk  Goal: Absence of Fall and Fall-Related Injury  Outcome: Ongoing, Progressing  Intervention: Identify and Manage Contributors  Recent Flowsheet Documentation  Taken 8/15/2024 0940 by Ivory Pathak RN  Medication Review/Management: medications reviewed     Problem: Pain Acute  Goal: Acceptable Pain Control and Functional Ability  Outcome: Ongoing, Progressing  Intervention: Prevent or Manage Pain  Recent Flowsheet Documentation  Taken 8/15/2024 0940 by Ivory Pathak RN  Sensory Stimulation  Regulation: lighting decreased  Medication Review/Management: medications reviewed  Intervention: Optimize Psychosocial Wellbeing  Recent Flowsheet Documentation  Taken 8/15/2024 0940 by Ivory Pathak RN  Supportive Measures: active listening utilized  Diversional Activities: television     Problem: Alcohol Withdrawal  Goal: Alcohol Withdrawal Symptom Control  Outcome: Ongoing, Progressing  Intervention: Minimize or Manage Alcohol Withdrawal Symptoms  Recent Flowsheet Documentation  Taken 8/15/2024 0940 by Ivory Pathak RN  Sensory Stimulation Regulation: lighting decreased     Problem: Acute Neurologic Deterioration (Alcohol Withdrawal)  Goal: Optimal Neurologic Function  Outcome: Ongoing, Progressing  Intervention: Minimize or Manage Acute Neurologic Symptoms  Recent Flowsheet Documentation  Taken 8/15/2024 0940 by Ivory Pathak RN  Sensory Stimulation Regulation: lighting decreased     Problem: Substance Misuse (Alcohol Withdrawal)  Goal: Readiness for Change Identified  Outcome: Ongoing, Progressing  Intervention: Partner to Facilitate Behavior Change  Recent Flowsheet Documentation  Taken 8/15/2024 0940 by Ivory Pathak RN  Supportive Measures: active listening utilized  Intervention: Promote Psychosocial Wellbeing  Recent Flowsheet Documentation  Taken 8/15/2024 0940 by Ivory Pathak RN  Family/Support System Care: self-care encouraged     Problem: Nausea and Vomiting  Goal: Fluid and Electrolyte Balance  Outcome: Ongoing, Progressing     Problem: Electrolyte Imbalance  Goal: Electrolyte Balance  Outcome: Ongoing, Progressing     Problem: Skin Injury Risk Increased  Goal: Skin Health and Integrity  Outcome: Ongoing, Progressing  Intervention: Optimize Skin Protection  Recent Flowsheet Documentation  Taken 8/15/2024 0940 by Ivory Pathak RN  Pressure Reduction Techniques: frequent weight shift encouraged  Pressure Reduction Devices: alternating pressure pump (ADD)  Skin Protection: adhesive use limited   Goal  Outcome Evaluation:

## 2024-08-15 NOTE — PROGRESS NOTES
"Nutrition Services    Patient Name:  Javi Doran  YOB: 1960  MRN: [id number]  Admit Date:  8/10/2024Assessment Date:  08/15/24    NUTRITION SCREENING      Reason for Encounter Follow-up protocol   Diagnosis/Problem Hypokalemia   PMH Alcohol abuse, CHF   PO Diet Diet: Regular/House, Cardiac; Healthy Heart (2-3 Na+); Fluid Consistency: Thin (IDDSI 0)   Supplements Boost plus, miles   PO Intake % Ate 75% of breakfast this am       Medications MAR reviewed by RD, noted diuretic    Labs  Listed below, reviewed   Physical Findings Alert, confused, 1+ edema   GI Function Last BM 8/15   Skin Status Midline sacral spine PI stage 2       Height  Weight  BMI  Weight Trend     Height: 182.9 cm (72\")  Weight: 74.9 kg (165 lb 2 oz) (08/15/24 0443)  Body mass index is 22.39 kg/m².  Stable       Nutrition Problem (PES) Increased nutrient needs related to wound healing as evidence by loss of skin integrity        Intervention/Plan Visited pt and encouraged good po intake with all meals and supplements. Pt did not take the Miles this am for breakfast, encouraged for wound healing.    RD to follow up per protocol.     Estimated/Assessed Needs       Energy Requirements    Weight for Calculation 78kg   Method for Estimation  25-30 kcal/kg   EST Needs (kcal/day) 5799-7469       Protein Requirements    Weight for Calculation 78kg   EST Protein Needs (g/kg) 1.2 - 1.5 gm/kg   EST Daily Needs (g/day)        Fluid Requirements     Method for Estimation 1 mL/kcal    Estimated Needs (mL/day)       Results from last 7 days   Lab Units 08/15/24  0408 08/14/24  0615 08/13/24  0443   SODIUM mmol/L 137 137 134*   POTASSIUM mmol/L 3.1* 4.0 3.5   CHLORIDE mmol/L 98 99 96*   CO2 mmol/L 26.0 22.7 23.3   BUN mg/dL 35* 34* 34*   CREATININE mg/dL 2.02* 1.95* 1.84*   CALCIUM mg/dL 8.8 8.7 8.2*   BILIRUBIN mg/dL 2.5* 2.7* 2.9*   ALK PHOS U/L 315* 308* 294*   ALT (SGPT) U/L 93* 94* 74*   AST (SGOT) U/L 145* 170* 141*   GLUCOSE mg/dL " 88 94 101*     Results from last 7 days   Lab Units 08/15/24  0408 08/14/24  0615 08/13/24  0443   MAGNESIUM mg/dL 2.2 2.3 2.4   PHOSPHORUS mg/dL 2.5 2.8 2.5   HEMOGLOBIN g/dL  --  10.3* 9.5*   HEMATOCRIT %  --  32.0* 30.2*     Lab Results   Component Value Date    HGBA1C 5.2 01/03/2024         Electronically signed by:  Jessi Butler RD  08/15/24 11:54 EDT

## 2024-08-15 NOTE — PROGRESS NOTES
Name: Javi Doran ADMIT: 8/10/2024   : 1960  PCP: Ginette Bryant MD    MRN: [id number] LOS: 4 days   AGE/SEX: 64 y.o. male  ROOM: [facility]     Subjective   Subjective   Patient is lying on the bed and is chronically ill-appearing and continues to complain of dyspnea although does not appear to be any respiratory distress and is currently on 2 L of oxygen.     Objective   Objective   Vital Signs  Temp:  [97.3 °F (36.3 °C)-97.5 °F (36.4 °C)] 97.5 °F (36.4 °C)  Heart Rate:  [58-68] 58  Resp:  [18-20] 20  BP: (107-111)/(71-90) 107/71  SpO2:  [93 %-100 %] 97 %  on  Flow (L/min):  [2] 2;   Device (Oxygen Therapy): room air  Body mass index is 22.38 kg/m².  Physical Exam  Vitals reviewed.   Constitutional:       General: He is not in acute distress.     Appearance: He is well-developed. He is ill-appearing.      Comments: Drowsy   HENT:      Head: Normocephalic and atraumatic.   Eyes:      General: Scleral icterus present.   Cardiovascular:      Rate and Rhythm: Normal rate and regular rhythm.      Heart sounds: No murmur heard.  Pulmonary:      Effort: Pulmonary effort is normal. No respiratory distress.      Breath sounds: Normal breath sounds. No wheezing.   Abdominal:      General: There is distension.      Palpations: Abdomen is soft.      Tenderness: There is no abdominal tenderness.   Musculoskeletal:      Right lower leg: No edema.      Left lower leg: No edema.   Skin:     General: Skin is warm and dry.      Coloration: Skin is pale.      Findings: No rash.   Neurological:      Comments: No real tremor or asterixis       Results Review     I reviewed the patient's new clinical results.  Results from last 7 days   Lab Units 24  0615 24  0443 24  0619 24  0239   WBC 10*3/mm3 8.86 7.80 9.72 9.05   HEMOGLOBIN g/dL 10.3* 9.5* 9.7* 8.8*   PLATELETS 10*3/mm3 237 257 256 226     Results from last 7 days   Lab Units 08/15/24  0408 24  0615 24  0443  "08/12/24  0619   SODIUM mmol/L 137 137 134* 135*   POTASSIUM mmol/L 3.1* 4.0 3.5 3.4*   CHLORIDE mmol/L 98 99 96* 94*   CO2 mmol/L 26.0 22.7 23.3 23.7   BUN mg/dL 35* 34* 34* 35*   CREATININE mg/dL 2.02* 1.95* 1.84* 1.83*   GLUCOSE mg/dL 88 94 101* 96   EGFR mL/min/1.73 36.4* 37.9* 40.7* 41.0*     Results from last 7 days   Lab Units 08/15/24  0408 08/14/24  0615 08/13/24  0443 08/12/24  0619   ALBUMIN g/dL 3.7 3.5 3.5 3.6   BILIRUBIN mg/dL 2.5* 2.7* 2.9* 3.1*   ALK PHOS U/L 315* 308* 294* 276*   AST (SGOT) U/L 145* 170* 141* 111*   ALT (SGPT) U/L 93* 94* 74* 55*     Results from last 7 days   Lab Units 08/15/24  0408 08/14/24  0615 08/13/24  0443 08/12/24  0619   CALCIUM mg/dL 8.8 8.7 8.2* 8.2*   ALBUMIN g/dL 3.7 3.5 3.5 3.6   MAGNESIUM mg/dL 2.2 2.3 2.4 2.3   PHOSPHORUS mg/dL 2.5 2.8 2.5 3.4       No results found for: \"HGBA1C\", \"POCGLU\"    CT Chest Without Contrast Diagnostic    Result Date: 8/15/2024   1. Mild right and small left pleural effusion. Loculated pleural fluid seen in the left major fissure. 2. Airways disease and emphysema. 3. Small multifocal opacities in the upper lobes. Could be multifocal infection. Follow-up to resolution. 4. Dependent lower lobe opacities, with volume loss, favor atelectasis. 5. Moderate cardiomegaly  6. Enlarged main pulmonary artery, can be seen with pulmonary artery hypertension. 7. Mild nonspecific mediastinal lymphadenopathy  This report was finalized on 8/15/2024 10:05 AM by Dr. Javi Sutton M.D on Workstation: [id number]       I have personally reviewed all medications:  Scheduled Medications  arformoterol, 15 mcg, Nebulization, BID - RT   And  tiotropium bromide monohydrate, 2 puff, Inhalation, Daily - RT  folic acid, 1 mg, Oral, Daily  metoprolol succinate XL, 12.5 mg, Oral, Q24H  multivitamin with minerals, 1 tablet, Oral, Daily  pantoprazole, 40 mg, Oral, BID AC  rivaroxaban, 15 mg, Oral, Daily With Dinner  sertraline, 100 mg, Oral, Nightly  sodium chloride, " 10 mL, Intravenous, Q12H  thiamine (B-1) IV, 200 mg, Intravenous, Q8H   Followed by  [START ON 8/17/2024] thiamine, 100 mg, Oral, Daily    Infusions   Diet  Diet: Regular/House, Cardiac; Healthy Heart (2-3 Na+); Fluid Consistency: Thin (IDDSI 0)    I have personally reviewed:  [x]  Laboratory   []  Microbiology   [x]  Radiology   []  EKG/Telemetry  [x]  Cardiology/Vascular   []  Pathology    []  Records       Assessment/Plan     Active Hospital Problems    Diagnosis  POA    EDMOND (acute kidney injury) [N17.9]  Yes    Alcohol withdrawal syndrome without complication [F10.930]  Yes    Essential hypertension [I10]  Yes    Anemia [D64.9]  Yes    Acute on chronic systolic congestive heart failure [I50.23]  Yes    Anxiety [F41.9]  Yes      Resolved Hospital Problems    Diagnosis Date Resolved POA    **Hypokalemia [E87.6] 08/14/2024 Yes       64 y.o. male with history of alcohol abuse and NICM admitted with dyspnea, EDMOND and Hypokalemia.    Dyspnea: CT chest is suggestive of only small pleural effusion with loculated pleural fluid seen in the left major fissure.  There is evidence of emphysema and small multifocal opacities were also noted.  No evidence of any infiltrate as such or a PE.  On Brovana and Spiriva which will be continued..  Currently on 2 L of oxygen which will be continued.  2D echo is being checked as well.    PAF status post ablation: Given mild QT prolongation amiodarone was discontinued by cardiology and is currently on Xarelto and Toprol low-dose.    EDMOND stable: Creatinine is slowly rising and noted to be at 2.02 and baseline is around 1.1.  Check renal ultrasound and avoid nephrotoxins as well as diuretics.  Nephrology consult will also be obtained.    ETOH abuse/withdrawal-continue thiamine/folate/MVI.  Currently not going through any withdrawal and is on a CIWA protocol.    Alcoholic hepatitis: LFTs continue to take upward slightly.  Continue to monitor daily  - Checked ammonia which is  normal    Anemia- No overt bleeding though obviously some risk given ETOH use.  H&H stable  - Continue PPI and monitor daily  - Hold AC if any overt bleeding      SCD/Xarelto  Dispo: TBD.      Pasha Wells MD  [facility]  08/15/24  15:50 EDT

## 2024-08-15 NOTE — PROGRESS NOTES
[facility] Progress Note      Patient Name: Javi Doran  : 1960  MRN: [id number]  Primary Care Physician:  Ginette Bryant MD  Date of admission: 8/10/2024    Subjective     Interval History:   Follow-up acute kidney injury.  Urine output 1850.  Weight down further.  Remains  short of breath with any exertion.  Minimal cough.  Chest x-ray with mild pulmonary edema and/or pneumonia.  Review of Systems:   As noted above    Objective     Vitals:   Temp:  [97.3 °F (36.3 °C)-97.9 °F (36.6 °C)] 97.5 °F (36.4 °C)  Heart Rate:  [60-68] 65  Resp:  [18-20] 20  BP: (104-111)/(72-90) 111/72  Flow (L/min):  [2] 2    Intake/Output Summary (Last 24 hours) at 8/15/2024 1001  Last data filed at 8/15/2024 0225  Gross per 24 hour   Intake 460 ml   Output 1625 ml   Net -1165 ml       Physical Exam:    General Appearance: Calm.  Awakens and answers questions appropriately.  Skin: warm and dry  HEENT: oral mucosa normal, nonicteric sclera  Neck: supple, no JVD  Lungs: Few scattered rales.  No wheezing.  Unlabored on room air but does get dyspneic moving around in bed.  Heart: RRR, normal S1 and S2  Abdomen: soft, nontender, nondistended. +bs  : no palpable bladder  Extremities: Trace lower extremity edema  Neuro: Awake, alert.  Answers appropriately.  Scheduled Meds:     arformoterol, 15 mcg, Nebulization, BID - RT   And  tiotropium bromide monohydrate, 2 puff, Inhalation, Daily - RT  folic acid, 1 mg, Oral, Daily  metoprolol succinate XL, 12.5 mg, Oral, Q24H  multivitamin with minerals, 1 tablet, Oral, Daily  pantoprazole, 40 mg, Oral, BID AC  rivaroxaban, 15 mg, Oral, Daily With Dinner  sertraline, 100 mg, Oral, Nightly  sodium chloride, 10 mL, Intravenous, Q12H  spironolactone, 25 mg, Oral, Daily  thiamine (B-1) IV, 200 mg, Intravenous, Q8H   Followed by  [START ON 2024] thiamine, 100 mg, Oral, Daily      IV Meds:        Results Reviewed:   I have personally  reviewed the results from the time of this admission to 8/15/2024 10:01 EDT     Results from last 7 days   Lab Units 08/15/24  0408 08/14/24  0615 08/13/24 0443   SODIUM mmol/L 137 137 134*   POTASSIUM mmol/L 3.1* 4.0 3.5   CHLORIDE mmol/L 98 99 96*   CO2 mmol/L 26.0 22.7 23.3   BUN mg/dL 35* 34* 34*   CREATININE mg/dL 2.02* 1.95* 1.84*   CALCIUM mg/dL 8.8 8.7 8.2*   BILIRUBIN mg/dL 2.5* 2.7* 2.9*   ALK PHOS U/L 315* 308* 294*   ALT (SGPT) U/L 93* 94* 74*   AST (SGOT) U/L 145* 170* 141*   GLUCOSE mg/dL 88 94 101*       Estimated Creatinine Clearance: 39.7 mL/min (A) (by C-G formula based on SCr of 2.02 mg/dL (H)).    Results from last 7 days   Lab Units 08/15/24  0408 08/14/24  0615 08/13/24  0443   MAGNESIUM mg/dL 2.2 2.3 2.4   PHOSPHORUS mg/dL 2.5 2.8 2.5             Results from last 7 days   Lab Units 08/14/24  0615 08/13/24  0443 08/12/24  0619 08/11/24  0239 08/10/24  1141   WBC 10*3/mm3 8.86 7.80 9.72 9.05 8.70   HEMOGLOBIN g/dL 10.3* 9.5* 9.7* 8.8* 9.6*   PLATELETS 10*3/mm3 237 257 256 226 222       Results from last 7 days   Lab Units 08/10/24  1141   INR  3.30*       Assessment / Plan     ASSESSMENT:  Acute kidney injury.  Initially due to hypotension and decompensated systolic heart failure.  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  Creatinine slowly Increasing.  Hold diuretic today.  Potassium low .  Replace.  Alcohol abuse and withdrawal.  On [facility] protocol.  Alcoholic hepatitis.  Transaminases really unchanged.  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  Acute on chronic ischemic systolic cardiomyopathy.  Prior stents.  Echo at [facility] 7/3/2024 with EF 30 to 35%, akinetic anteroseptal apical and anteroapical walls.  I think that his pulmonary infiltrates at this point are less likely pulmonary edema as we have significantly diuresed him and he is still very symptomatic.  CT of the chest pending.  Prior history of hypertrophic cardiomyopathy with myomectomy and maze procedure as well  as mitral valve replacement.  7.  Anemia low iron.  Check ferritin.  PLAN:  Hold diuretic today.  Replace potassium and recheck.    Thank you for involving us in the care of Javi DIAS Doran.  Please feel free to call with any questions.    Katina Waldron MD  08/15/24  10:01 EDT    [facility]  [phone]    Please note that portions of this note were completed with a voice recognition program.

## 2024-08-15 NOTE — CONSULTS
CONSULT NOTE    Patient Identification:  Javi Doran  64 y.o.  male  1960  [id number]            Requesting physician: Dr. Pasha Wells    Reason for Consultation: Shortness of breath pleural effusions emphysematous changes on CT scan    CC: Shortness of breath    History of Present Illness:  Patient is a 64-year-old with a previous medical history of atrial fibrillation mitral valve replacement coronary artery disease myomectomy for hypertrophic cardiomyopathy who presented with acute decompensated systolic heart failure.  Patient is also sent from [facility] he has been diuresed however still is persistently short of breath.  He is undergoing cardiac optimization.  Diuresis held today secondary to renal dysfunction.    We were asked to see him secondary to CT scan showing bilateral pleural effusions shortness of breath.    The patient denies any sputum production cough fever or chills.  He says he is never formally been diagnosed with breathing test his COPD however he has been told that he has this before he takes what sounds like Anoro.  He also has an albuterol rescue inhaler but he says these do not seem to help him much.  He is not short of breath at rest however is short of breath with exertion.    At present time he is a difficult historian as he falls asleep during our conversation.      Review of Systems:  As per HPI otherwise a 12 system review of systems performed and all else negative    Past Medical History:   Diagnosis Date    Alcoholism     Atrial fibrillation     Constipation     Depression     Depression with anxiety     Obstructive hypertrophic cardiomyopathy     Persistent insomnia     Solitary pulmonary nodule on lung CT        Past Surgical History:   Procedure Laterality Date    ADENOIDECTOMY      CARDIAC SURGERY      HEMORRHOIDECTOMY      OTHER SURGICAL HISTORY      PTCA: DIAGNOSTIC CATH CORONARY DOMINANCE    OTHER SURGICAL HISTORY      TONSILLECTOMY WITH ADENOIDECTOMY               [facility]    [address] [facility] [address] [address]    Phone:  [phone]    Fax:  [phone]       Thank You for choosing us for your health care visit. We are glad to serve you and happy to provide you with this summary of your visit. Please help us to ensure we have accurate records. If you find anything that needs to be changed, please let our staff know as soon as possible.          Your Demographic Information     Patient Name Sex Sapphire Coats Female 1950       Ethnic Group Patient Race    Not of  or  Origin White      Your Visit Details     Date & Time Provider Department    2017 8:00 AM Kimberli Mena DO [facility]      Your Upcoming Appointment*(Max 10)     2017  9:15 AM CDT   Consultation with Nona Venegas MD   [facility]    [address] [facility] [address]   [phone]              We Ordered or Performed the Following     SERVICE TO DERMATOLOGY       Conditions Discussed Today or Order-Related Diagnoses        Comments    Medicare annual wellness visit, subsequent    -  Primary     Hidradenitis         Severe episode of recurrent major depressive disorder, without psychotic features (CMS/HCC)           Your Vitals Were     BP Pulse Height SpO2 Smoking Status       120/60 74 5' 4\" (1.626 m) 97% Former Smoker       Medications Prescribed or Re-Ordered Today     Benzocaine 10 % Ointment    Sig: Apply to affected areas every 6 hours as needed for pain.  DX:  Hidradenitis Suppurtiva    Class: Eprescribe    Pharmacy: [facility] - [address] Ph #: [phone]    sertraline (ZOLOFT) 50 MG tablet    Sig - Route: Take 1 tablet by mouth daily. - Oral    Class: Eprescribe    Pharmacy: [facility] - [address] Ph #:  [phone]      Your Current Medications Are        Disp Refills Start End    omeprazole (PRILOSEC) 20 MG capsule 180 capsule 1 6/12/2017     Sig: TAKE 1 CAPSULE TWICE DAILY    Class: Eprescribe    verapamil 120 MG tablet 90 tablet 1 6/12/2017     Sig: TAKE 1 TABLET EVERY NIGHT    Class: Eprescribe    atenolol (TENORMIN) 100 MG tablet 180 tablet 1 6/12/2017     Sig: TAKE 1 TABLET TWICE DAILY    Class: Eprescribe    atorvastatin (LIPITOR) 10 MG tablet 90 tablet 1 5/17/2017     Sig - Route: Take 1 tablet by mouth daily. - Oral    Class: Eprescribe    polyethylene glycol (MIRALAX) powder 1530 g 5 2/1/2017     Sig: MIX 17 GRAMS OF POWDER IN LIQUID AS DIRECTED AND DRINK DAILY    Class: Eprescribe    calcium carbonate (TUMS) 500 MG chewable tablet        Sig - Route: Chew 3 tablets by mouth daily. Indications: Osteoporosis - Oral    Class: Historical Med    cholecalciferol (VITAMIN D3) 1000 UNIT tablet        Sig - Route: Take 3,000 Units by mouth daily.  - Oral    Class: Historical Med    aspirin 81 MG tablet        Sig - Route: Take 1 tablet by mouth daily. - Oral    Class: Historical Med    Multiple Vitamin (MULTIVITAMIN) capsule        Sig - Route: Take 1 capsule by mouth daily. - Oral    Class: Historical Med    Benzocaine 10 % Ointment 90 g 4 6/23/2017     Sig: Apply to affected areas every 6 hours as needed for pain.  DX:  Hidradenitis Suppurtiva    Class: Eprescribe    sertraline (ZOLOFT) 50 MG tablet 90 tablet 1 6/23/2017     Sig - Route: Take 1 tablet by mouth daily. - Oral    Class: Eprescribe      Discontinued Medications        Reason for Discontinue    fish oil-omega-3 fatty acids 1000 MG CAPS Not Needed    Ferrous Sulfate (IRON) 325 (65 FE) MG TABS Not Needed    metroNIDAZOLE (FLAGYL) 500 MG tablet Therapy Completed    clindamycin (CLEOCIN) 300 MG capsule Not Needed    benzocaine/menthol (DERMOPLAST) 20-0.5 % Aerosol Not Needed    albuterol (ACCUNEB) 1.25 MG/3ML nebulizer solution Not Needed    mupirocin  TONSILLECTOMY          Facility-Administered Medications Prior to Admission   Medication Dose Route Frequency Provider Last Rate Last Admin    [DISCONTINUED] cyanocobalamin injection 1,000 mcg  1,000 mcg Intramuscular Q30 Days Duke Razo DO   1,000 mcg at 02/18/16 0949     Medications Prior to Admission   Medication Sig Dispense Refill Last Dose    amiodarone (PACERONE) 200 MG tablet Take 1 tablet by mouth Daily.   8/9/2024    atorvastatin (LIPITOR) 80 MG tablet Take 1 tablet by mouth Every Night.   8/9/2024    furosemide (LASIX) 20 MG tablet Take 2 tablets by mouth 2 (Two) Times a Day.   8/9/2024    losartan (COZAAR) 25 MG tablet Take 1 tablet by mouth Daily.   Past Month    metoprolol tartrate (LOPRESSOR) 25 MG tablet Take 1 tablet by mouth 2 (Two) Times a Day.   8/9/2024    rivaroxaban (XARELTO) 15 MG tablet Take 1 tablet by mouth Daily With Dinner.   Past Week    sertraline (ZOLOFT) 100 MG tablet Take 1 tablet by mouth Every Night.   Past Week    spironolactone (ALDACTONE) 25 MG tablet Take 1 tablet by mouth Daily.   Past Month    albuterol sulfate  (90 Base) MCG/ACT inhaler Inhale 2 puffs Every 4 (Four) Hours As Needed for Wheezing.   Unknown    busPIRone (BUSPAR) 15 MG tablet Take 1 tablet by mouth 2 (Two) Times a Day As Needed (anxiety). Has not used in the past 6 months but has if needed   More than a month    potassium chloride 10 MEQ CR tablet Take 2 tablets by mouth 2 (Two) Times a Day. Has not filled in 3 months and has been using OTC products, however has not been consistently taking these.   More than a month    Umeclidinium-Vilanterol (Anoro Ellipta) 62.5-25 MCG/ACT aerosol powder  inhaler Inhale 1 puff Daily.   More than a month       No Known Allergies    Social History     Socioeconomic History    Marital status: Single   Tobacco Use    Smoking status: Every Day     Current packs/day: 1.00     Types: Cigarettes   Vaping Use    Vaping status: Never Used   Substance and Sexual Activity  "   Alcohol use: Yes     Comment: former alcoholic. drinks occassionally.    Drug use: No    Sexual activity: Defer       Family History   Problem Relation Age of Onset    Cardiomyopathy Brother        Physical Exam:  /71   Pulse 58   Temp 97.5 °F (36.4 °C) (Oral)   Resp 20   Ht 182.9 cm (72\")   Wt 74.8 kg (165 lb)   SpO2 97%   BMI 22.38 kg/m²   Body mass index is 22.38 kg/m².   General appearance: Drowsy falls asleep during conversation, conversant   Eyes: Anicteric sclerae, moist conjunctivae; no lid lag;   HENT: Atraumatic; oropharynx clear with moist mucous membranes and no mucosal ulcerations; normal hard and soft palate  Neck: Trachea midline; supple  Lungs: Bilateral air entry without wheeze diminished at bases faint crackles posterior bases, with normal respiratory effort and no intercostal retractions  CV: Regular regular no rub  Abdomen: Nonrigid bowel sounds positive  Extremities: No substantial peripheral edema or extremity lymphadenopathy  Skin: Warm dry well-perfused  Psych/neuro drowsy will awaken and speak is intact answers questions appropriately follows sleep and not engaged    LABS:  Results from last 7 days   Lab Units 08/14/24  0615 08/13/24  0443 08/12/24  0619 08/11/24  0239 08/10/24  1141   WBC 10*3/mm3 8.86 7.80 9.72 9.05 8.70   HEMOGLOBIN g/dL 10.3* 9.5* 9.7* 8.8* 9.6*   PLATELETS 10*3/mm3 237 257 256 226 222     Results from last 7 days   Lab Units 08/15/24  1552 08/15/24  0408 08/14/24  0615 08/13/24  0443 08/12/24  0619 08/11/24  2128 08/11/24  0239 08/10/24  2228 08/10/24  1141   SODIUM mmol/L  --  137 137 134* 135*  --  136 134* 134*   POTASSIUM mmol/L 3.5 3.1* 4.0 3.5 3.4* 2.5* 2.1* 2.2* 1.8*   CHLORIDE mmol/L  --  98 99 96* 94*  --  93* 91* 89*   CO2 mmol/L  --  26.0 22.7 23.3 23.7  --  27.0 27.0 30.0*   BUN mg/dL  --  35* 34* 34* 35*  --  29* 26* 21   CREATININE mg/dL  --  2.02* 1.95* 1.84* 1.83*  --  1.89* 1.74* 1.59*   GLUCOSE mg/dL  --  88 94 101* 96  --  85 141* 132* " (BACTROBAN) 2 % ointment Not Needed      Allergies     Rocephin [Ceftriaxone Sodium In Dextrose] ANAPHYLAXIS    Difficulty breathing    Cephalosporins     difficulty breathing; rash- reaction to Rocephin    Tetracycline     rash      Immunizations History as of 6/23/2017     Name Date    HERPES ZOSTER SHINGLES 8/1/2011  4:00 PM    INFLUENZA QUADRIVALENT 10/20/2016, 10/5/2015, 10/8/2014    Influenza 10/5/2013, 10/2/2012, 9/30/2011    Influenza A novel H1N1 10/21/2009    Pneumococcal Conjugate 13 Valent 10/20/2016    Pneumococcal Polysaccharide Adult 10/26/2006    Tdap 7/17/2009      Problem List as of 6/23/2017     Rosacea    FIBROMYALGIA    ABNORMAL WEIGHT GAIN: 5'5\"; 150 LBS    HYPERLIPIDEMIA    Hidradenitis    Personal history of tobacco use, presenting hazards to health    POSTSURG : S/P STEW, SP BLADDER  REPAIR    FAMILY HX MALIGN NEOPL OVARY: DAUGHTER    ABNORM RYTHM: PSVT, ON TOPROL    CBC : INC  MCV AT  101, PROB  ETOH    Abscess of buttock, right    Abscess of left and right buttock, s/p exc. and drainage 11/19/12    COPD, mild (CMS/HCC)    Benign essential hypertension            Patient Instructions     None         CALCIUM mg/dL  --  8.8 8.7 8.2* 8.2*  --  8.2* 8.0* 8.6   MAGNESIUM mg/dL  --  2.2 2.3 2.4 2.3  --  2.3  --  2.0   PHOSPHORUS mg/dL  --  2.5 2.8 2.5 3.4  --  4.0  --  3.0   Estimated Creatinine Clearance: 39.6 mL/min (A) (by C-G formula based on SCr of 2.02 mg/dL (H)).    Imaging: I personally visualized the images of scans/x-rays performed within last 3 days.  Imaging Results (Most Recent)       Procedure Component Value Units Date/Time    CT Chest Without Contrast Diagnostic [[id number]] Collected: 08/15/24 0956     Updated: 08/15/24 1008    Narrative:      CT CHEST WO CONTRAST DIAGNOSTIC-     INDICATION: Dyspnea on exertion     COMPARISON: None     TECHNIQUE:  Routine CT chest without IV contrast. Coronal and sagittal reformats.  Radiation dose reduction techniques were utilized, including automated  exposure control and exposure modulation based on body size.     FINDINGS:      Chest wall: Gynecomastia. No lymphadenopathy.     Mediastinum: Three-vessel coronary artery atherosclerotic  calcifications. Moderate cardiomegaly. No pericardial effusion. Enlarged  main pulmonary artery measuring 3.8 cm. Mild mediastinal  lymphadenopathy. For example, subcarinal lymph node, series 2, axial  image 50, measures 1.5 cm. For example, AP window lymph node, series 2,  axial image 40, measures 1.0 cm. No hilar lymphadenopathy.     Lungs/pleura: Mild right pleural effusion. Small left pleural effusion  with loculated pleural fluid seen in the major fissure. Airways wall  thickening. Respiratory motion artifact. Mild centrilobular emphysema.  Subpleural blebs seen at the apices. Small multifocal mostly groundglass  opacities in the right upper lobe. For example, peripheral focal  groundglass opacity in the apical right upper lobe, series 3, axial mage  26. Subsegmental atelectasis in the right middle lobe. Heterogeneous  dependent right lower lobe opacities, with volume loss. Small multifocal  left upper lobe opacities,  appearing predominantly groundglass.  Heterogeneous left lower lobe opacities seen in the dependent lung, with  volume loss, suspect subsegmental atelectasis. Calcified pulmonary  nodule in the left lower lobe base, consistent with prior granulomatous  infection.     Upper abdomen: Mild pancreatic lipomatosis. Symmetric perinephric edema.     Osseous structures: Median sternotomy. Old left posterior rib fractures.       Impression:         1. Mild right and small left pleural effusion. Loculated pleural fluid  seen in the left major fissure.  2. Airways disease and emphysema.  3. Small multifocal opacities in the upper lobes. Could be multifocal  infection. Follow-up to resolution.  4. Dependent lower lobe opacities, with volume loss, favor atelectasis.  5. Moderate cardiomegaly   6. Enlarged main pulmonary artery, can be seen with pulmonary artery  hypertension.  7. Mild nonspecific mediastinal lymphadenopathy     This report was finalized on 8/15/2024 10:05 AM by Dr. Javi Sutton M.D on Workstation: [id number]       XR Chest PA & Lateral [[id number]] Collected: 08/13/24 1447     Updated: 08/13/24 1453    Narrative:      XR CHEST PA AND LATERAL-     HISTORY: Male who is 64 years-old, short of breath     TECHNIQUE: Frontal and lateral views of the chest     COMPARISON: 8/10/2024     FINDINGS: The heart is enlarged. Sternotomy wires are present. Aorta is  tortuous. Pulmonary vasculature again shows mild central prominence,  similar to prior exam. Patchy density at the mid to lower lungs appears  increased, and may represent edema an/or pneumonia, follow-up suggested.  Minimal pleural effusions are apparent. No pneumothorax. Old left rib  fractures are evident.       Impression:      As described.     This report was finalized on 8/13/2024 2:50 PM by Dr. Dustin Huffman M.D on Workstation: [id number]       XR Chest 1 View [[id number]] Collected: 08/10/24 1225     Updated: 08/10/24 1231    Narrative:       Emergency portable view of the chest on 8/10/2024     CLINICAL HISTORY: This 64-year-old male patient with lower extremity  edema     This correlated to a remote prior PA and lateral chest x-ray and  8/6/2017.     FINDINGS: There are multiple sternal wires miso markers from previous  cardiac surgery. The cardiomediastinal silhouette is enlarged. There is  mild central pulmonary vascular engorgement. There is very minimal  interstitial prominence in the lungs and minimal interstitial edema  cannot be excluded. I see no airspace consolidation. There is very  minimal blunting of the right lateral costophrenic angle suggesting a  tiny right effusion. There are old fractures of the left seventh eighth  and ninth lateral ribs.        Impression:      1. Patient is had a previous median sternotomy and has enlarged  cardiomediastinal silhouette and mild central pulmonary vascular  engorgement. There is very minimal interstitial prominence in lungs that  may be chronic although difficult exclude minimal interstitial edema and  correlate clinically. There is the suggestion of a tiny right effusion  minimal blunting the right lateral costophrenic angle. No additional  active disease is seen in the chest.     2. There are old healed fracture of the left seventh through ninth  lateral ribs.     This report was finalized on 8/10/2024 12:28 PM by Dr. Duke Langston M.D  on Workstation: [id number]               Assessment / Recommendations:  B pleural effusion  Loculated Effusion in Fissure  Acute on chronic congestive heart failure with an EF of 34%  History of hypertrophic cardiomyopathy status post myectomy  Paroxysmal atrial fibs status post Maze procedure  History of mitral valve repair  Coronary artery disease status post PCI  [facility]  Alcoholic hepatitis  Anemia  Troponin elevation      Stat ABG for drowsiness  Check ammonia  DuoNebs scheduled  Okay with arformoterol and will add budesonide  Stop Spiriva with above    Emphysema  changes on CT scan    No white count fever or cough at present  Will go ahead and send a procalcitonin    If felt not to be able to diurese further limited by renal function could proceed with thoracentesis.  Would start with right sided for therapeutic and diagnostic benefit however INR would have to be lower I suspect.    Discussed with bedside nurse she denies giving any sedating medications today.  Will rule out pulmonary cause of drowsiness otherwise defer to primary hospitalist service      Wilbur Singh MD  [facility]  08/15/24  16:40 EDT

## 2024-08-15 NOTE — PLAN OF CARE
Goal Outcome Evaluation:  Plan of Care Reviewed With: patient        Progress: improving  Outcome Evaluation: Pt tolerated treatment fair this date. Required SBA for bed mobility and to stand. Pt ambulated 30ft w/ Rw and CGA-min A, slightly unsteady at times. Limited overall d/t fatigue, also stating SOA while O2 sats were reading upper 90s-100%. Encouraged pt to sit up in chair, though declined at the time. Also encouraged pt to ambulate w/ nsg in room later.      Anticipated Discharge Disposition (PT): skilled nursing facility

## 2024-08-15 NOTE — THERAPY TREATMENT NOTE
Patient Name: Javi Doran  : 1960    MRN: [id number]                              Today's Date: 8/15/2024       Admit Date: 8/10/2024    Visit Dx:     ICD-10-CM ICD-9-CM   1. Hypokalemia  E87.6 276.8   2. EDMOND (acute kidney injury)  N17.9 584.9   3. Lower extremity edema  R60.0 782.3   4. Anemia, unspecified type  D64.9 285.9   5. Elevated troponin  R79.89 790.6     Patient Active Problem List   Diagnosis    Elevated alanine aminotransferase (ALT) level    Anemia    Anxiety    Coronary arteriosclerosis in native artery    Cobalamin deficiency    Mass of breast    Chronic kidney disease    Acute on chronic systolic congestive heart failure    Constipation    Gastroesophageal reflux disease    Fatigue    Gastric ulcer    Gynecomastia    History of alcohol abuse    Hyperlipidemia    Hypogonadism in male    Major depressive disorder, recurrent episode    Primary insomnia    Temporary cerebral vascular dysfunction    Essential hypertension    Benzodiazepine misuse    EDMOND (acute kidney injury)    Alcohol withdrawal syndrome without complication     Past Medical History:   Diagnosis Date    Alcoholism     Atrial fibrillation     Constipation     Depression     Depression with anxiety     Obstructive hypertrophic cardiomyopathy     Persistent insomnia     Solitary pulmonary nodule on lung CT      Past Surgical History:   Procedure Laterality Date    ADENOIDECTOMY      CARDIAC SURGERY      HEMORRHOIDECTOMY      OTHER SURGICAL HISTORY      PTCA: DIAGNOSTIC CATH CORONARY DOMINANCE    OTHER SURGICAL HISTORY      TONSILLECTOMY WITH ADENOIDECTOMY    TONSILLECTOMY        General Information       Row Name 08/15/24 1153          Physical Therapy Time and Intention    Document Type therapy note (daily note)  -     Mode of Treatment physical therapy  -       Row Name 08/15/24 1153          General Information    Existing Precautions/Restrictions fall  -       Row Name 08/15/24 1153          Cognition    Orientation  Status (Cognition) oriented x 3  -SM               User Key  (r) = Recorded By, (t) = Taken By, (c) = Cosigned By      Initials Name Provider Type    Renée Farley PTA Physical Therapist Assistant                   Mobility       Row Name 08/15/24 1155          Bed Mobility    Bed Mobility supine-sit;sit-supine  -     Supine-Sit [place] (Bed Mobility) standby assist  -     Sit-Supine [place] (Bed Mobility) standby assist  -     Assistive Device (Bed Mobility) bed rails;head of bed elevated  -       Row Name 08/15/24 1155          Sit-Stand Transfer    Sit-Stand [place] (Transfers) standby assist  -       Row Name 08/15/24 1155          Gait/Stairs (Locomotion)    [place] Level (Gait) contact guard;minimum assist (75% patient effort)  -     Assistive Device (Gait) walker, front-wheeled  -     Patient was able to Ambulate yes  -     Distance in Feet (Gait) 30  -SM     Deviations/Abnormal Patterns (Gait) nita decreased;stride length decreased  -     Bilateral Gait Deviations forward flexed posture  -     Comment, (Gait/Stairs) limited d/t fatigue; pt declined sitting up in chair  -               User Key  (r) = Recorded By, (t) = Taken By, (c) = Cosigned By      Initials Name Provider Type    Renée Farley PTA Physical Therapist Assistant                   Obj/Interventions    No documentation.                  Goals/Plan    No documentation.                  Clinical Impression       Row Name 08/15/24 1156          Pain    Pretreatment Pain Rating 0/10 - no pain  -     Posttreatment Pain Rating 0/10 - no pain  -       Row Name 08/15/24 1156          Positioning and Restraints    Pre-Treatment Position in bed  -     Post Treatment Position bed  -SM     In Bed supine;call light within reach;encouraged to call for assist;exit alarm on  -               User Key  (r) = Recorded By, (t) = Taken By, (c) = Cosigned By      Initials Name Provider Type     Renée Farley PTA Physical Therapist Assistant                   Outcome Measures       Row Name 08/15/24 1156          How much help from another person do you currently need...    Turning from your back to your side while in flat bed without using bedrails? 3  -SM     Moving from lying on back to sitting on the side of a flat bed without bedrails? 3  -SM     Moving to and from a bed to a chair (including a wheelchair)? 3  -SM     Standing up from a chair using your arms (e.g., wheelchair, bedside chair)? 3  -SM     Climbing 3-5 steps with a railing? 2  -SM     To walk in hospital room? 3  -SM     AM-PAC 6 Clicks Score (PT) 17  -SM     Highest Level of Mobility Goal 5 --> Static standing  -       Row Name 08/15/24 1156          Functional Assessment    Outcome Measure Options AM-PAC 6 Clicks Basic Mobility (PT)  -               User Key  (r) = Recorded By, (t) = Taken By, (c) = Cosigned By      Initials Name Provider Type    Renée Farley PTA Physical Therapist Assistant                                 Physical Therapy Education       Title: PT OT SLP Therapies (Done)       Topic: Physical Therapy (Done)       Point: Mobility training (Done)       Learning Progress Summary             Patient Acceptance, E,TB,D, VU,NR by  at 8/15/2024 1157    Acceptance, E,TB,D, VU,NR by  at 8/14/2024 1340    Acceptance, E,TB,D, VU,NR by  at 8/12/2024 1558    Acceptance, E, VU,NR by  at 8/11/2024 1209                         Point: Home exercise program (Done)       Learning Progress Summary             Patient Acceptance, E,TB,D, VU,NR by  at 8/15/2024 1157    Acceptance, E,TB,D, VU,NR by  at 8/14/2024 1340    Acceptance, E,TB,D, VU,NR by  at 8/12/2024 1558                         Point: Body mechanics (Done)       Learning Progress Summary             Patient Acceptance, E,TB,D, VU,NR by  at 8/15/2024 1157    Acceptance, E,TB,D, VU,NR by  at 8/14/2024 1340    Acceptance, E,TB,D, VU,NR by   at 8/12/2024 1558    Acceptance, E, VU,NR by  at 8/11/2024 1209                         Point: Precautions (Done)       Learning Progress Summary             Patient Acceptance, E,TB,D, VU,NR by  at 8/15/2024 1157    Acceptance, E,TB,D, VU,NR by  at 8/14/2024 1340    Acceptance, E,TB,D, VU,NR by  at 8/12/2024 1558    Acceptance, E, VU,NR by  at 8/11/2024 1209                                         User Key       Initials Effective Dates Name Provider Type Discipline     03/07/18 -  Renée Aldrich PTA Physical Therapist Assistant PT    TARAH 10/25/19 -  Sierra Jamison PT Physical Therapist PT                  PT Recommendation and Plan     Plan of Care Reviewed With: patient  Progress: improving  Outcome Evaluation: Pt tolerated treatment fair this date. Required SBA for bed mobility and to stand. Pt ambulated 30ft w/ Rw and CGA-min A, slightly unsteady at times. Limited overall d/t fatigue, also stating SOA while O2 sats were reading upper 90s-100%. Encouraged pt to sit up in chair, though declined at the time. Also encouraged pt to ambulate w/ nsg in room later.     Time Calculation:         PT Charges       Row Name 08/15/24 1159             Time Calculation    Start Time 1115  -      Stop Time 1125  -      Time Calculation (min) 10 min  -      PT Received On 08/15/24  -      PT - Next Appointment 08/16/24  -                User Key  (r) = Recorded By, (t) = Taken By, (c) = Cosigned By      Initials Name Provider Type     Renée Aldrich PTA Physical Therapist Assistant                  Therapy Charges for Today       Code Description Service Date Service Provider Modifiers Qty    74766019744 HC GAIT TRAINING EA 15 MIN 8/14/2024 Renée Aldrich PTA GP 1    32254219102 HC GAIT TRAINING EA 15 MIN 8/15/2024 Renée Aldrich PTA GP 1            PT G-Codes  Outcome Measure Options: AM-PAC 6 Clicks Basic Mobility (PT)  AM-PAC 6 Clicks Score (PT): 17  PT Discharge  Summary  Anticipated Discharge Disposition (PT): skilled nursing facility    Renée Aldrich, PTA  8/15/2024

## 2024-08-15 NOTE — PROGRESS NOTES
LOS: 4 days   Patient Care Team:  Ginette Bryant MD as PCP - General (Family Medicine)    Chief Complaint: Follow-up alcohol abuse and withdrawal, acute on chronic systolic CHF, hypertrophic cardiomyopathy status post myectomy and mitral valve replacement, coronary artery disease, left bundle branch block, prolonged QT interval.    Interval History: He was somnolent but arousable.  He denied any chest pain.  He still feels short of breath with exertion.    Vital Signs:  Temp:  [97.3 °F (36.3 °C)-97.5 °F (36.4 °C)] 97.5 °F (36.4 °C)  Heart Rate:  [58-68] 58  Resp:  [18-20] 20  BP: (107-111)/(71-90) 107/71    Intake/Output Summary (Last 24 hours) at 8/15/2024 1515  Last data filed at 8/15/2024 1325  Gross per 24 hour   Intake 940 ml   Output 1250 ml   Net -310 ml       Physical Exam:   General Appearance:    No acute distress, somnolent but awakens and answers appropriately.   Lungs:     Scant rales rhonchi bilaterally.    Heart:    Regular rhythm and normal rate.  Distant, but no obvious murmur.   Abdomen:     Soft, nontender, nondistended.    Extremities:   Trace edema of the lower extremities.     Results Review:    Results from last 7 days   Lab Units 08/15/24  0408   SODIUM mmol/L 137   POTASSIUM mmol/L 3.1*   CHLORIDE mmol/L 98   CO2 mmol/L 26.0   BUN mg/dL 35*   CREATININE mg/dL 2.02*   GLUCOSE mg/dL 88   CALCIUM mg/dL 8.8     Results from last 7 days   Lab Units 08/10/24  1527 08/10/24  1141   HSTROP T ng/L 40* 50*     Results from last 7 days   Lab Units 08/14/24  0615   WBC 10*3/mm3 8.86   HEMOGLOBIN g/dL 10.3*   HEMATOCRIT % 32.0*   PLATELETS 10*3/mm3 237     Results from last 7 days   Lab Units 08/10/24  1141   INR  3.30*   APTT seconds 46.6*         Results from last 7 days   Lab Units 08/15/24  0408   MAGNESIUM mg/dL 2.2           I reviewed the patient's new clinical results.        Assessment:  1.  Acute on chronic systolic CHF (EF in July 2024 at 34%)  2.  History of  hypertrophic cardiomyopathy, status post myectomy  3.  Paroxysmal atrial fibrillation, status post maze procedure at the time of his myectomy  4.  History of mitral valve repair at the time of myectomy  5.  Coronary artery disease with multiple stents in the past  6.  Left bundle branch block  7.  Prolonged QT interval  8.  Acute kidney injury  9.  Alcohol abuse with withdrawal  10.  Emphysema by CT scan  11.  Suspected pulmonary hypertension  12.  Hypokalemia  13.  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  Alcoholic hepatitis  15.  Multifactorial anemia    Plan:  -Medical issues.  His guideline directed medical therapy for the cardiomyopathy is limited by his kidney disease and lower blood pressure.  Continue Toprol-XL 12.5 mg for now.      -Chronic left bundle branch block, although the QT interval is still significantly prolonged even with a left bundle.  Stopped amiodarone yesterday.  He is still in sinus rhythm.  Continue Toprol-XL 12.5 mg/day.    -Continues to have shortness of breath despite diuresis and other modalities.  CT of the chest showed some upper lobe infiltrates, but no major pleural effusions or pulmonary edema on my reading.  I will check a limited echocardiogram for LV function and to evaluate the mitral valve repair.    -Holding diuretics today per Dr. Waldron.  Nephrology is dosing.  Potassium being repleted.    -On Xarelto, and also has a coagulopathy related to his liver disease.    -Ongoing significant alcohol use and withdrawal is obviously contributing to all of his issues.    Dandy Kilpatrick MD  08/15/24  15:15 EDT

## 2024-08-15 NOTE — NURSING NOTE
"  Access center note.    Patient alert and oriented x3. Per overnight RN, patient restless, buspar given. Per dietician patient ate 75% breakfast, supplement Boost, viola. Patient participated in PT today. CIWA this shift 7, 3. Patient reports anxiety/depression \"about the same.\" Current medication zoloft. Patient discussed planning on going to AA. He has been provided [facility] treatment resources. Patient reports sleep improved, appetite still decreased. Access following.   "

## 2024-08-16 LAB
ALBUMIN SERPL-MCNC: 3.4 G/DL (ref 3.5–5.2)
ALBUMIN/GLOB SERPL: 1.3 G/DL
ALP SERPL-CCNC: 287 U/L (ref 39–117)
ALT SERPL W P-5'-P-CCNC: 112 U/L (ref 1–41)
ANION GAP SERPL CALCULATED.3IONS-SCNC: 11.2 MMOL/L (ref 5–15)
AST SERPL-CCNC: 182 U/L (ref 1–40)
BASOPHILS # BLD AUTO: 0.04 10*3/MM3 (ref 0–0.2)
BASOPHILS NFR BLD AUTO: 0.6 % (ref 0–1.5)
BILIRUB SERPL-MCNC: 2.4 MG/DL (ref 0–1.2)
BUN SERPL-MCNC: 32 MG/DL (ref 8–23)
BUN/CREAT SERPL: 17.3 (ref 7–25)
CALCIUM SPEC-SCNC: 9 MG/DL (ref 8.6–10.5)
CHLORIDE SERPL-SCNC: 101 MMOL/L (ref 98–107)
CO2 SERPL-SCNC: 24.8 MMOL/L (ref 22–29)
CREAT SERPL-MCNC: 1.85 MG/DL (ref 0.76–1.27)
DEPRECATED RDW RBC AUTO: 58.2 FL (ref 37–54)
EGFRCR SERPLBLD CKD-EPI 2021: 40.4 ML/MIN/1.73
EOSINOPHIL # BLD AUTO: 0.09 10*3/MM3 (ref 0–0.4)
EOSINOPHIL NFR BLD AUTO: 1.4 % (ref 0.3–6.2)
ERYTHROCYTE [DISTWIDTH] IN BLOOD BY AUTOMATED COUNT: 19.7 % (ref 12.3–15.4)
GLOBULIN UR ELPH-MCNC: 2.7 GM/DL
GLUCOSE SERPL-MCNC: 89 MG/DL (ref 65–99)
HCT VFR BLD AUTO: 30.1 % (ref 37.5–51)
HGB BLD-MCNC: 9.6 G/DL (ref 13–17.7)
IMM GRANULOCYTES # BLD AUTO: 0.03 10*3/MM3 (ref 0–0.05)
IMM GRANULOCYTES NFR BLD AUTO: 0.5 % (ref 0–0.5)
INR PPP: 3 (ref 0.9–1.1)
LYMPHOCYTES # BLD AUTO: 0.73 10*3/MM3 (ref 0.7–3.1)
LYMPHOCYTES NFR BLD AUTO: 11 % (ref 19.6–45.3)
MAGNESIUM SERPL-MCNC: 2.3 MG/DL (ref 1.6–2.4)
MCH RBC QN AUTO: 26.7 PG (ref 26.6–33)
MCHC RBC AUTO-ENTMCNC: 31.9 G/DL (ref 31.5–35.7)
MCV RBC AUTO: 83.6 FL (ref 79–97)
MONOCYTES # BLD AUTO: 0.62 10*3/MM3 (ref 0.1–0.9)
MONOCYTES NFR BLD AUTO: 9.4 % (ref 5–12)
NEUTROPHILS NFR BLD AUTO: 5.12 10*3/MM3 (ref 1.7–7)
NEUTROPHILS NFR BLD AUTO: 77.1 % (ref 42.7–76)
NRBC BLD AUTO-RTO: 0 /100 WBC (ref 0–0.2)
PHOSPHATE SERPL-MCNC: 3 MG/DL (ref 2.5–4.5)
PLATELET # BLD AUTO: 162 10*3/MM3 (ref 140–450)
PMV BLD AUTO: 9 FL (ref 6–12)
POTASSIUM SERPL-SCNC: 3.6 MMOL/L (ref 3.5–5.2)
PROT SERPL-MCNC: 6.1 G/DL (ref 6–8.5)
PROTHROMBIN TIME: 31.4 SECONDS (ref 11.7–14.2)
QT INTERVAL: 554 MS
QTC INTERVAL: 549 MS
RBC # BLD AUTO: 3.6 10*6/MM3 (ref 4.14–5.8)
SODIUM SERPL-SCNC: 137 MMOL/L (ref 136–145)
WBC NRBC COR # BLD AUTO: 6.63 10*3/MM3 (ref 3.4–10.8)

## 2024-08-16 PROCEDURE — 25010000002 THIAMINE PER 100 MG: Performed by: HOSPITALIST

## 2024-08-16 PROCEDURE — 99232 SBSQ HOSP IP/OBS MODERATE 35: CPT | Performed by: NURSE PRACTITIONER

## 2024-08-16 PROCEDURE — 94799 UNLISTED PULMONARY SVC/PX: CPT

## 2024-08-16 PROCEDURE — 80053 COMPREHEN METABOLIC PANEL: CPT | Performed by: HOSPITALIST

## 2024-08-16 PROCEDURE — 94760 N-INVAS EAR/PLS OXIMETRY 1: CPT

## 2024-08-16 PROCEDURE — 97116 GAIT TRAINING THERAPY: CPT

## 2024-08-16 PROCEDURE — 94664 DEMO&/EVAL PT USE INHALER: CPT

## 2024-08-16 PROCEDURE — 85025 COMPLETE CBC W/AUTO DIFF WBC: CPT | Performed by: INTERNAL MEDICINE

## 2024-08-16 PROCEDURE — 94761 N-INVAS EAR/PLS OXIMETRY MLT: CPT

## 2024-08-16 PROCEDURE — 97530 THERAPEUTIC ACTIVITIES: CPT

## 2024-08-16 PROCEDURE — 83735 ASSAY OF MAGNESIUM: CPT | Performed by: INTERNAL MEDICINE

## 2024-08-16 PROCEDURE — 84100 ASSAY OF PHOSPHORUS: CPT | Performed by: INTERNAL MEDICINE

## 2024-08-16 PROCEDURE — 93005 ELECTROCARDIOGRAM TRACING: CPT | Performed by: NURSE PRACTITIONER

## 2024-08-16 PROCEDURE — 63710000001 ONDANSETRON ODT 4 MG TABLET DISPERSIBLE: Performed by: HOSPITALIST

## 2024-08-16 PROCEDURE — 85610 PROTHROMBIN TIME: CPT | Performed by: NURSE PRACTITIONER

## 2024-08-16 RX ORDER — POTASSIUM CHLORIDE 750 MG/1
30 TABLET, FILM COATED, EXTENDED RELEASE ORAL
Status: COMPLETED | OUTPATIENT
Start: 2024-08-16 | End: 2024-08-16

## 2024-08-16 RX ORDER — LORAZEPAM 0.5 MG/1
0.25 TABLET ORAL 2 TIMES DAILY PRN
Status: DISCONTINUED | OUTPATIENT
Start: 2024-08-16 | End: 2024-08-19 | Stop reason: HOSPADM

## 2024-08-16 RX ADMIN — BUSPIRONE HYDROCHLORIDE 15 MG: 15 TABLET ORAL at 00:10

## 2024-08-16 RX ADMIN — PANTOPRAZOLE SODIUM 40 MG: 40 TABLET, DELAYED RELEASE ORAL at 17:54

## 2024-08-16 RX ADMIN — METOPROLOL SUCCINATE 12.5 MG: 25 TABLET, EXTENDED RELEASE ORAL at 08:50

## 2024-08-16 RX ADMIN — LORAZEPAM 0.25 MG: 0.5 TABLET ORAL at 21:45

## 2024-08-16 RX ADMIN — FOLIC ACID 1 MG: 1 TABLET ORAL at 08:50

## 2024-08-16 RX ADMIN — ONDANSETRON 4 MG: 4 TABLET, ORALLY DISINTEGRATING ORAL at 12:51

## 2024-08-16 RX ADMIN — IPRATROPIUM BROMIDE AND ALBUTEROL SULFATE 3 ML: .5; 3 SOLUTION RESPIRATORY (INHALATION) at 10:37

## 2024-08-16 RX ADMIN — LORAZEPAM 0.25 MG: 0.5 TABLET ORAL at 14:45

## 2024-08-16 RX ADMIN — BUDESONIDE 0.5 MG: 0.5 INHALANT ORAL at 20:15

## 2024-08-16 RX ADMIN — Medication 10 ML: at 20:38

## 2024-08-16 RX ADMIN — THIAMINE HYDROCHLORIDE 200 MG: 100 INJECTION, SOLUTION INTRAMUSCULAR; INTRAVENOUS at 05:51

## 2024-08-16 RX ADMIN — POTASSIUM CHLORIDE 30 MEQ: 750 TABLET, EXTENDED RELEASE ORAL at 17:54

## 2024-08-16 RX ADMIN — IPRATROPIUM BROMIDE AND ALBUTEROL SULFATE 3 ML: .5; 3 SOLUTION RESPIRATORY (INHALATION) at 15:21

## 2024-08-16 RX ADMIN — ARFORMOTEROL TARTRATE 15 MCG: 15 SOLUTION RESPIRATORY (INHALATION) at 07:45

## 2024-08-16 RX ADMIN — RIVAROXABAN 15 MG: 15 TABLET, FILM COATED ORAL at 17:54

## 2024-08-16 RX ADMIN — BUDESONIDE 0.5 MG: 0.5 INHALANT ORAL at 07:44

## 2024-08-16 RX ADMIN — ARFORMOTEROL TARTRATE 15 MCG: 15 SOLUTION RESPIRATORY (INHALATION) at 20:16

## 2024-08-16 RX ADMIN — SERTRALINE 100 MG: 100 TABLET, FILM COATED ORAL at 20:38

## 2024-08-16 RX ADMIN — IPRATROPIUM BROMIDE AND ALBUTEROL SULFATE 3 ML: .5; 3 SOLUTION RESPIRATORY (INHALATION) at 20:14

## 2024-08-16 RX ADMIN — THIAMINE HYDROCHLORIDE 200 MG: 100 INJECTION, SOLUTION INTRAMUSCULAR; INTRAVENOUS at 16:11

## 2024-08-16 RX ADMIN — Medication 1 TABLET: at 08:50

## 2024-08-16 RX ADMIN — IPRATROPIUM BROMIDE AND ALBUTEROL SULFATE 3 ML: .5; 3 SOLUTION RESPIRATORY (INHALATION) at 07:40

## 2024-08-16 RX ADMIN — POTASSIUM CHLORIDE 30 MEQ: 750 TABLET, EXTENDED RELEASE ORAL at 20:38

## 2024-08-16 RX ADMIN — Medication 10 ML: at 08:51

## 2024-08-16 RX ADMIN — PANTOPRAZOLE SODIUM 40 MG: 40 TABLET, DELAYED RELEASE ORAL at 08:50

## 2024-08-16 NOTE — PROGRESS NOTES
Access Center follow up d/t EtOH; this writer reviewed chart and spoke with RN Salinas. Per RN, patient did not achieve much sleep overnight; he may benefit from medication to assist with sleep.  Last CIWA 3 at 19:55; patient has [facility] resources and plans to attend AA. No further needs/concerns noted at this time per RN and/or medical team; Access Center to continue following.

## 2024-08-16 NOTE — PROGRESS NOTES
"    Patient Name: Javi Doran  :1960  64 y.o.      Patient Care Team:  Ginette Bryant MD as PCP - General (Family Medicine)    Chief Complaint: follow up alcohol abuse and withdraw;. Acute on chronic systolic CHF, HOCM, LBBB    Interval History: waste products peaked at 2. Down to 1.85 today. Diuretics held yesterday per nephrology. C/o KNIGHT. Ok at rest. No CP.        Objective   Vital Signs  Temp:  [97.2 °F (36.2 °C)-97.3 °F (36.3 °C)] 97.2 °F (36.2 °C)  Heart Rate:  [56-61] 56  Resp:  [18-20] 18  BP: (101-107)/(71-84) 106/80    Intake/Output Summary (Last 24 hours) at 2024 0925  Last data filed at 2024 0552  Gross per 24 hour   Intake 840 ml   Output 875 ml   Net -35 ml     Flowsheet Rows      Flowsheet Row First Filed Value   Admission Height 182.9 cm (72\") Documented at 08/10/2024 1500   Admission Weight 76.4 kg (168 lb 6.9 oz) Documented at 08/10/2024 1436            Physical Exam:   General Appearance:    Alert, cooperative, in no acute distress   Lungs:     Clear to auscultation.  Normal respiratory effort and rate.      Heart:    Regular rhythm and normal rate, normal S1 and S2, no murmurs, gallops or rubs.     Chest Wall:    No abnormalities observed   Abdomen:     Soft, nontender, positive bowel sounds.     Extremities:   no cyanosis, clubbing or edema.  No marked joint deformities.  Adequate musculoskeletal strength.       Results Review:    Results from last 7 days   Lab Units 24  0554   SODIUM mmol/L 137   POTASSIUM mmol/L 3.6   CHLORIDE mmol/L 101   CO2 mmol/L 24.8   BUN mg/dL 32*   CREATININE mg/dL 1.85*   GLUCOSE mg/dL 89   CALCIUM mg/dL 9.0     Results from last 7 days   Lab Units 08/10/24  1527 08/10/24  1141   HSTROP T ng/L 40* 50*     Results from last 7 days   Lab Units 24  0554   WBC 10*3/mm3 6.63   HEMOGLOBIN g/dL 9.6*   HEMATOCRIT % 30.1*   PLATELETS 10*3/mm3 162     Results from last 7 days   Lab Units 08/10/24  1141   INR  3.30*   APTT " seconds 46.6*     Results from last 7 days   Lab Units 08/16/24  0554   MAGNESIUM mg/dL 2.3                   Medication Review:   arformoterol, 15 mcg, Nebulization, BID - RT  budesonide, 0.5 mg, Nebulization, BID - RT  folic acid, 1 mg, Oral, Daily  ipratropium-albuterol, 3 mL, Nebulization, 4x Daily - RT  metoprolol succinate XL, 12.5 mg, Oral, Q24H  multivitamin with minerals, 1 tablet, Oral, Daily  pantoprazole, 40 mg, Oral, BID AC  rivaroxaban, 15 mg, Oral, Daily With Dinner  sertraline, 100 mg, Oral, Nightly  sodium chloride, 10 mL, Intravenous, Q12H  thiamine (B-1) IV, 200 mg, Intravenous, Q8H   Followed by  [START ON 8/17/2024] thiamine, 100 mg, Oral, Daily              Assessment & Plan   Acute on chronic heart failure with reduced left ventricular systolic function (EF 34%).  GDMT limited by hypotension and  renal insufficiency. On minimal metoprolol succinate (decreased due to hypotension).  History of hypertrophic cardiomyopathy status post myectomy  Paroxysmal atrial fibrillation status post MAZE procedure at time of myectomy . On rivaroxaban.   Mitral valve regurgitation status post mitral valve repair  Coronary artery disease with multiple prior percutaneous interventions.  LBBB, 1st degree AVB  Prolonged QT interval, amiodarone stopped 8/14.  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   Multifactorial anemia   Pleural effusions , right > left. Likely not able to mobilize this much more with diuretics given renal dysfunction. Will check an INR. Consider thoracentesis.     Continue low dose beta blocker. HR and BP seem reasonable.   He is in SR.   Possible thoracentesis. Check INR.   Diuretics per nephrology.     LELA Hernandez  [facility]  08/16/24  09:25 EDT'

## 2024-08-16 NOTE — PROGRESS NOTES
[facility]  [phone]  Dr. Osmani Shine    Subjective:  LOS: 5    Chief Complaint: Dyspnea    Patient is awake and alert.  Very concerned about his multiple medical problems.  States he is to make life changes in order to improve.    Objective   Vital Signs past 24hrs  Temp range: Temp (24hrs), Av.3 °F (36.3 °C), Min:97.2 °F (36.2 °C), Max:97.3 °F (36.3 °C)    BP range: BP: (101-107)/(71-84) 106/80  Pulse range: Heart Rate:  [56-61] 56  Resp rate range: Resp:  [18-20] 18  Device (Oxygen Therapy): nasal cannulaFlow (L/min):  [2] 2  Oxygen range:SpO2:  [97 %-100 %] 100 %   Mechanical Ventilator:     Physical Exam  Eyes:      Pupils: Pupils are equal, round, and reactive to light.   Cardiovascular:      Rate and Rhythm: Normal rate and regular rhythm.      Heart sounds: No murmur heard.  Pulmonary:      Effort: Pulmonary effort is normal.      Breath sounds: Decreased breath sounds present.      Comments: Absent breath sounds in lung bases  Abdominal:      General: Bowel sounds are normal.      Palpations: Abdomen is soft. There is no mass.      Tenderness: There is no abdominal tenderness.   Musculoskeletal:         General: No swelling.   Neurological:      Mental Status: He is alert.       Results Review:    I have reviewed the laboratory and imaging data since the last note by [facility] physician.  My annotations are noted in assessment and plan.      Result Review:  I have personally reviewed the results from last note by [facility] physician to 2024 08:30 EDT and agree with these findings:  [x]  Laboratory list / accordion  [x]  Microbiology  [x]  Radiology  []  EKG/Telemetry   []  Cardiology/Vascular   []  Pathology  []  Old records  []  Other:    Medication Review:  I have reviewed the current MAR.  My annotations are noted in assessment and plan.    arformoterol, 15 mcg, Nebulization, BID - RT  budesonide, 0.5 mg, Nebulization, BID - RT  folic acid, 1 mg, Oral, Daily  ipratropium-albuterol, 3  mL, Nebulization, 4x Daily - RT  metoprolol succinate XL, 12.5 mg, Oral, Q24H  multivitamin with minerals, 1 tablet, Oral, Daily  pantoprazole, 40 mg, Oral, BID AC  rivaroxaban, 15 mg, Oral, Daily With Dinner  sertraline, 100 mg, Oral, Nightly  sodium chloride, 10 mL, Intravenous, Q12H  thiamine (B-1) IV, 200 mg, Intravenous, Q8H   Followed by  [START ON 8/17/2024] thiamine, 100 mg, Oral, Daily           Lines, Drains & Airways       Active LDAs       Name Placement date Placement time Site Days    Peripheral IV 08/10/24 1142 Anterior;Right Forearm 08/10/24  1142  Forearm  5                  Isolation status: No active isolations    Dietary Orders (From admission, onward)       Start     Ordered    08/13/24 1800  Dietary Nutrition Supplements Boost Plus (Ensure Enlive, Ensure Plus)  Daily With Breakfast & Dinner      Question:  Select Supplement:  Answer:  Boost Plus (Ensure Enlive, Ensure Plus)    08/13/24 1346    08/13/24 1800  Dietary Nutrition Supplements Miles  2 Times Daily      Question:  Select Supplement:  Answer:  Miles    08/13/24 1346    08/10/24 1443  Diet: Regular/House, Cardiac; Healthy Heart (2-3 Na+); Fluid Consistency: Thin (IDDSI 0)  Diet Effective Now        References:    Diet Order [place]   Question Answer Comment   Diets: Regular/House    Diets: Cardiac    Cardiac Diet: Healthy Heart (2-3 Na+)    Fluid Consistency: Thin (IDDSI 0)        08/10/24 1446                    PCCM Problems  AMS  Dyspnea  Bilateral pleural effusions  Patchy bilateral pulmonary infiltrates  Bibasilar infiltrates, likely atelectasis  COPD, requires PFT's  Current cig smoker  EDMOND  Acute on chronic HFrEF, EF 34%  Afib  Hx MV repair  CAD with hx PCI  Alcoholism  Anemia        THESE ARE NEW MEDICAL PROBLEMS TO ME.    Plan of Treatment    Appears alert and oriented today.    No dyspnea at rest.  Clearly with evidence of COPD on clinical exam with diminished breath sounds.  Continue with bronchodilators.    Patchy  bilateral pulmonary infiltrates noted.  However no clinical evidence of infection.  Procalcitonin unlikely to be helpful in context of EDMOND.    Bilateral pleural effusions and likely indicative of fluid overload.  Diuresis as per cardiology and nephrology.    Patient was strongly counseled to quit smoking completely.    Note history of alcoholism and remains on protocol for alcohol withdrawal.    Osmani Shine MD  08/16/24  08:30 EDT      Part of this note may be an electronic transcription/translation of spoken language to printed text using the Dragon Dictation System.

## 2024-08-16 NOTE — THERAPY TREATMENT NOTE
Patient Name: Javi Doran  : 1960    MRN: [id number]                              Today's Date: 2024       Admit Date: 8/10/2024    Visit Dx:     ICD-10-CM ICD-9-CM   1. Hypokalemia  E87.6 276.8   2. EDMOND (acute kidney injury)  N17.9 584.9   3. Lower extremity edema  R60.0 782.3   4. Anemia, unspecified type  D64.9 285.9   5. Elevated troponin  R79.89 790.6     Patient Active Problem List   Diagnosis    Elevated alanine aminotransferase (ALT) level    Anemia    Anxiety    Coronary arteriosclerosis in native artery    Cobalamin deficiency    Mass of breast    Chronic kidney disease    Acute on chronic systolic congestive heart failure    Constipation    Gastroesophageal reflux disease    Fatigue    Gastric ulcer    Gynecomastia    History of alcohol abuse    Hyperlipidemia    Hypogonadism in male    Major depressive disorder, recurrent episode    Primary insomnia    Temporary cerebral vascular dysfunction    Essential hypertension    Benzodiazepine misuse    EDMOND (acute kidney injury)    Alcohol withdrawal syndrome without complication     Past Medical History:   Diagnosis Date    Alcoholism     Atrial fibrillation     Constipation     Depression     Depression with anxiety     Obstructive hypertrophic cardiomyopathy     Persistent insomnia     Solitary pulmonary nodule on lung CT      Past Surgical History:   Procedure Laterality Date    ADENOIDECTOMY      CARDIAC SURGERY      HEMORRHOIDECTOMY      OTHER SURGICAL HISTORY      PTCA: DIAGNOSTIC CATH CORONARY DOMINANCE    OTHER SURGICAL HISTORY      TONSILLECTOMY WITH ADENOIDECTOMY    TONSILLECTOMY        General Information       Row Name 24 4115          Physical Therapy Time and Intention    Document Type therapy note (daily note)  -     Mode of Treatment physical therapy  -       Row Name 24 3264          General Information    Existing Precautions/Restrictions fall;oxygen therapy device and L/min  -       Row Name 24 1798           Cognition    Orientation Status (Cognition) oriented x 3  -SM               User Key  (r) = Recorded By, (t) = Taken By, (c) = Cosigned By      Initials Name Provider Type    Renée Farley PTA Physical Therapist Assistant                   Mobility       Row Name 08/16/24 1627          Bed Mobility    Bed Mobility supine-sit  -     Supine-Sit [place] (Bed Mobility) supervision  -     Assistive Device (Bed Mobility) bed rails;head of bed elevated  -       Row Name 08/16/24 1627          Sit-Stand Transfer    Sit-Stand [place] (Transfers) standby assist  -       Row Name 08/16/24 1627          Gait/Stairs (Locomotion)    [place] Level (Gait) contact guard  -     Assistive Device (Gait) walker, front-wheeled  -     Patient was able to Ambulate yes  -     Distance in Feet (Gait) 80  -SM     Deviations/Abnormal Patterns (Gait) nita decreased;stride length decreased  -     Bilateral Gait Deviations forward flexed posture  -     Comment, (Gait/Stairs) limited d/t fatigue; ambulated w/ 2L O2  -               User Key  (r) = Recorded By, (t) = Taken By, (c) = Cosigned By      Initials Name Provider Type    Renée Farley PTA Physical Therapist Assistant                   Obj/Interventions    No documentation.                  Goals/Plan    No documentation.                  Clinical Impression       Row Name 08/16/24 1628          Pain    Pretreatment Pain Rating 0/10 - no pain  -     Posttreatment Pain Rating 0/10 - no pain  -[facility] Name 08/16/24 1628          Positioning and Restraints    Pre-Treatment Position in bed  -     Post Treatment Position chair  -     In Chair reclined;call light within reach;encouraged to call for assist;exit alarm on  -               User Key  (r) = Recorded By, (t) = Taken By, (c) = Cosigned By      Initials Name Provider Type    Renée Farley PTA Physical Therapist Assistant                   Outcome  Measures       Row Name 08/16/24 1628          How much help from another person do you currently need...    Turning from your back to your side while in flat bed without using bedrails? 4  -SM     Moving from lying on back to sitting on the side of a flat bed without bedrails? 4  -SM     Moving to and from a bed to a chair (including a wheelchair)? 3  -SM     Standing up from a chair using your arms (e.g., wheelchair, bedside chair)? 3  -SM     Climbing 3-5 steps with a railing? 3  -SM     To walk in hospital room? 3  -SM     AM-PAC 6 Clicks Score (PT) 20  -     Highest Level of Mobility Goal 6 --> Walk 10 steps or more  -       Row Name 08/16/24 1628          Functional Assessment    Outcome Measure Options AM-PAC 6 Clicks Basic Mobility (PT)  -               User Key  (r) = Recorded By, (t) = Taken By, (c) = Cosigned By      Initials Name Provider Type    Renée Farley PTA Physical Therapist Assistant                                 Physical Therapy Education       Title: PT OT SLP Therapies (Done)       Topic: Physical Therapy (Done)       Point: Mobility training (Done)       Learning Progress Summary             Patient Acceptance, E,TB,D, VU,NR by  at 8/16/2024 1628    Acceptance, E,TB,D, VU,NR by  at 8/15/2024 1157    Acceptance, E,TB,D, VU,NR by  at 8/14/2024 1340    Acceptance, E,TB,D, VU,NR by  at 8/12/2024 1558    Acceptance, E, VU,NR by  at 8/11/2024 1209                         Point: Home exercise program (Done)       Learning Progress Summary             Patient Acceptance, E,TB,D, VU,NR by  at 8/16/2024 1628    Acceptance, E,TB,D, VU,NR by  at 8/15/2024 1157    Acceptance, E,TB,D, VU,NR by  at 8/14/2024 1340    Acceptance, E,TB,D, VU,NR by  at 8/12/2024 1558                         Point: Body mechanics (Done)       Learning Progress Summary             Patient Acceptance, E,TB,D, VU,NR by  at 8/16/2024 1628    Acceptance, E,TB,D, VU,NR by  at 8/15/2024 2536     Acceptance, E,TB,D, VU,NR by  at 8/14/2024 1340    Acceptance, E,TB,D, VU,NR by  at 8/12/2024 1558    Acceptance, E, VU,NR by  at 8/11/2024 1209                         Point: Precautions (Done)       Learning Progress Summary             Patient Acceptance, E,TB,D, VU,NR by  at 8/16/2024 1628    Acceptance, E,TB,D, VU,NR by  at 8/15/2024 1157    Acceptance, E,TB,D, VU,NR by  at 8/14/2024 1340    Acceptance, E,TB,D, VU,NR by  at 8/12/2024 1558    Acceptance, E, VU,NR by  at 8/11/2024 1209                                         User Key       Initials Effective Dates Name Provider Type Discipline     03/07/18 -  Renée Aldrich PTA Physical Therapist Assistant PT     10/25/19 -  Sierra Jamison PT Physical Therapist PT                  PT Recommendation and Plan     Plan of Care Reviewed With: patient  Progress: improving  Outcome Evaluation: Pt tolerated treatment well this date. Pt was much more alert and oriented today, breathing better w/ activity. Ambulated on 2L O2, though able to ambulate 80ft w/ Rw and CGA. Encouraged pt to stay up in chair until after dinner, and to ambulate w/ nsg again later.     Time Calculation:         PT Charges       Row Name 08/16/24 1630             Time Calculation    Start Time 1440  -      Stop Time 1511  -      Time Calculation (min) 31 min  -      PT Received On 08/16/24  -      PT - Next Appointment 08/19/24  -                User Key  (r) = Recorded By, (t) = Taken By, (c) = Cosigned By      Initials Name Provider Type     Renée Aldrich PTA Physical Therapist Assistant                  Therapy Charges for Today       Code Description Service Date Service Provider Modifiers Qty    48660339690 HC GAIT TRAINING EA 15 MIN 8/15/2024 Renée Aldrich PTA GP 1    98616510071 HC GAIT TRAINING EA 15 MIN 8/16/2024 Renée Aldrich PTA GP 1    81194585686 HC PT THERAPEUTIC ACT EA 15 MIN 8/16/2024 Renée Aldrich PTA GP 1             PT G-Codes  Outcome Measure Options: AM-PAC 6 Clicks Basic Mobility (PT)  AM-PAC 6 Clicks Score (PT): 20  PT Discharge Summary  Anticipated Discharge Disposition (PT): home with home health, skilled nursing facility    Renée Aldrich, PTA  8/16/2024

## 2024-08-16 NOTE — PROGRESS NOTES
[facility] Progress Note      Patient Name: Javi Doran  : 1960  MRN: [id number]  Primary Care Physician:  Ginette Bryant MD  Date of admission: 8/10/2024    Subjective     Interval History:   Follow-up acute kidney injury.  Shortness of breath is improving; on 2 L/min  UOP yesterday not fully recorded    Review of Systems:   As noted above    Objective     Vitals:   Temp:  [97.2 °F (36.2 °C)-97.3 °F (36.3 °C)] 97.3 °F (36.3 °C)  Heart Rate:  [56-61] 59  Resp:  [17-18] 18  BP: (101-106)/(73-84) 101/75  Flow (L/min):  [2] 2    Intake/Output Summary (Last 24 hours) at 2024 1721  Last data filed at 2024 1510  Gross per 24 hour   Intake 1080 ml   Output 875 ml   Net 205 ml       Physical Exam:    General: Awake, impulsive, blunted  Skin: warm and dry  HEENT: oral mucosa normal, nonicteric sclera  Neck: supple, no JVD  Lungs: A few crackles, unlabored on 2 L/min.  Heart: RRR, normal S1 and S2  Abdomen: soft, nontender, nondistended. +bs  : no palpable bladder  Extremities: Trace lower extremity edema  Neuro: Awake, alert.  Answers appropriately.    Scheduled Meds:     arformoterol, 15 mcg, Nebulization, BID - RT  budesonide, 0.5 mg, Nebulization, BID - RT  folic acid, 1 mg, Oral, Daily  ipratropium-albuterol, 3 mL, Nebulization, 4x Daily - RT  metoprolol succinate XL, 12.5 mg, Oral, Q24H  multivitamin with minerals, 1 tablet, Oral, Daily  pantoprazole, 40 mg, Oral, BID AC  rivaroxaban, 15 mg, Oral, Daily With Dinner  sertraline, 100 mg, Oral, Nightly  sodium chloride, 10 mL, Intravenous, Q12H  [START ON 2024] thiamine, 100 mg, Oral, Daily      IV Meds:        Results Reviewed:   I have personally reviewed the results from the time of this admission to 2024 17:21 EDT     Results from last 7 days   Lab Units 24  0554 08/15/24  1552 08/15/24  0408 24  0615   SODIUM mmol/L 137  --  137 137   POTASSIUM mmol/L 3.6 3.5 3.1* 4.0    CHLORIDE mmol/L 101  --  98 99   CO2 mmol/L 24.8  --  26.0 22.7   BUN mg/dL 32*  --  35* 34*   CREATININE mg/dL 1.85*  --  2.02* 1.95*   CALCIUM mg/dL 9.0  --  8.8 8.7   BILIRUBIN mg/dL 2.4*  --  2.5* 2.7*   ALK PHOS U/L 287*  --  315* 308*   ALT (SGPT) U/L 112*  --  93* 94*   AST (SGOT) U/L 182*  --  145* 170*   GLUCOSE mg/dL 89  --  88 94       Estimated Creatinine Clearance: 43.2 mL/min (A) (by C-G formula based on SCr of 1.85 mg/dL (H)).    Results from last 7 days   Lab Units 08/16/24  0554 08/15/24  0408 08/14/24  0615   MAGNESIUM mg/dL 2.3 2.2 2.3   PHOSPHORUS mg/dL 3.0 2.5 2.8             Results from last 7 days   Lab Units 08/16/24  0554 08/14/24  0615 08/13/24  0443 08/12/24  0619 08/11/24  0239   WBC 10*3/mm3 6.63 8.86 7.80 9.72 9.05   HEMOGLOBIN g/dL 9.6* 10.3* 9.5* 9.7* 8.8*   PLATELETS 10*3/mm3 162 237 257 256 226       Results from last 7 days   Lab Units 08/16/24  0938 08/10/24  1141   INR  3.00* 3.30*       Assessment / Plan     ASSESSMENT:  EDMOND, nonoliguric, improving and multifactorial: hypotension and decompensated systolic heart failure, along with impaired renal autoregulation due to ARB.  Improving volume excess; electrolytes fine other than  Alcohol abuse and withdrawal.  On CIWA protocol.  Alcoholic hepatitis.  Transaminases worse.    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  Acute on chronic ischemic systolic cardiomyopathy.  Prior stents.  Echo at [facility] 7/3/2024 with EF 30 to 35%, akinetic anteroseptal apical and anteroapical walls.  Prior history of hypertrophic cardiomyopathy with myomectomy and maze procedure as well as mitral valve replacement.  7.  Anemia low iron    PLAN:  Replace potassium  Likely resume diuretic tomorrow    Thank you for involving us in the care of Javi Doran.  Please feel free to call with any questions.    Kody Padilla MD  08/16/24  17:21 EDT    [facility]  [phone]    Please note that portions of this note were  completed with a voice recognition program.

## 2024-08-16 NOTE — PLAN OF CARE
Goal Outcome Evaluation:  Plan of Care Reviewed With: patient        Progress: improving  Outcome Evaluation: Pt tolerated treatment well this date. Pt was much more alert and oriented today, breathing better w/ activity. Ambulated on 2L O2, though able to ambulate 80ft w/ Rw and CGA. Encouraged pt to stay up in chair until after dinner, and to ambulate w/ nsg again later.      Anticipated Discharge Disposition (PT): home with home health, skilled nursing facility

## 2024-08-16 NOTE — CASE MANAGEMENT/SOCIAL WORK
Continued Stay Note  [facility]     Patient Name: Javi Doran  MRN: [id number]  Today's Date: 8/16/2024    Admit Date: 8/10/2024    Plan: Home with family to transport. Family to bring in portable o2   Discharge Plan       Row Name 08/16/24 1526       Plan    Plan Home with family to transport. Family to bring in portable o2    Patient/Family in Agreement with Plan yes    Plan Comments Spoke with patient at bedside. Introduced self. Patient plans to return home with family to transport. Patient states family will bring portable oxygen from home for transport.                   Discharge Codes    No documentation.                 Expected Discharge Date and Time       Expected Discharge Date Expected Discharge Time    Aug 16, 2024               Monique Morgan RN

## 2024-08-17 ENCOUNTER — APPOINTMENT (OUTPATIENT)
Dept: CT IMAGING | Facility: HOSPITAL | Age: 64
DRG: 682 | End: 2024-08-17
Payer: MEDICARE

## 2024-08-17 ENCOUNTER — APPOINTMENT (OUTPATIENT)
Dept: GENERAL RADIOLOGY | Facility: HOSPITAL | Age: 64
DRG: 682 | End: 2024-08-17
Payer: MEDICARE

## 2024-08-17 LAB
ALBUMIN SERPL-MCNC: 3.3 G/DL (ref 3.5–5.2)
ALBUMIN/GLOB SERPL: 1.3 G/DL
ALP SERPL-CCNC: 279 U/L (ref 39–117)
ALT SERPL W P-5'-P-CCNC: 123 U/L (ref 1–41)
ANION GAP SERPL CALCULATED.3IONS-SCNC: 9.4 MMOL/L (ref 5–15)
AST SERPL-CCNC: 166 U/L (ref 1–40)
BASOPHILS # BLD AUTO: 0.04 10*3/MM3 (ref 0–0.2)
BASOPHILS NFR BLD AUTO: 0.6 % (ref 0–1.5)
BILIRUB SERPL-MCNC: 2.1 MG/DL (ref 0–1.2)
BUN SERPL-MCNC: 28 MG/DL (ref 8–23)
BUN/CREAT SERPL: 18.3 (ref 7–25)
CALCIUM SPEC-SCNC: 9 MG/DL (ref 8.6–10.5)
CHLORIDE SERPL-SCNC: 104 MMOL/L (ref 98–107)
CO2 SERPL-SCNC: 23.6 MMOL/L (ref 22–29)
CREAT SERPL-MCNC: 1.53 MG/DL (ref 0.76–1.27)
DEPRECATED RDW RBC AUTO: 57.8 FL (ref 37–54)
EGFRCR SERPLBLD CKD-EPI 2021: 50.8 ML/MIN/1.73
EOSINOPHIL # BLD AUTO: 0.11 10*3/MM3 (ref 0–0.4)
EOSINOPHIL NFR BLD AUTO: 1.8 % (ref 0.3–6.2)
ERYTHROCYTE [DISTWIDTH] IN BLOOD BY AUTOMATED COUNT: 19.3 % (ref 12.3–15.4)
GLOBULIN UR ELPH-MCNC: 2.6 GM/DL
GLUCOSE SERPL-MCNC: 96 MG/DL (ref 65–99)
HCT VFR BLD AUTO: 30.1 % (ref 37.5–51)
HGB BLD-MCNC: 9.6 G/DL (ref 13–17.7)
IMM GRANULOCYTES # BLD AUTO: 0.03 10*3/MM3 (ref 0–0.05)
IMM GRANULOCYTES NFR BLD AUTO: 0.5 % (ref 0–0.5)
LYMPHOCYTES # BLD AUTO: 0.71 10*3/MM3 (ref 0.7–3.1)
LYMPHOCYTES NFR BLD AUTO: 11.5 % (ref 19.6–45.3)
MCH RBC QN AUTO: 26.9 PG (ref 26.6–33)
MCHC RBC AUTO-ENTMCNC: 31.9 G/DL (ref 31.5–35.7)
MCV RBC AUTO: 84.3 FL (ref 79–97)
MONOCYTES # BLD AUTO: 0.61 10*3/MM3 (ref 0.1–0.9)
MONOCYTES NFR BLD AUTO: 9.9 % (ref 5–12)
NEUTROPHILS NFR BLD AUTO: 4.69 10*3/MM3 (ref 1.7–7)
NEUTROPHILS NFR BLD AUTO: 75.7 % (ref 42.7–76)
NRBC BLD AUTO-RTO: 0 /100 WBC (ref 0–0.2)
PLATELET # BLD AUTO: 148 10*3/MM3 (ref 140–450)
PMV BLD AUTO: 9.4 FL (ref 6–12)
POTASSIUM SERPL-SCNC: 4.3 MMOL/L (ref 3.5–5.2)
PROT SERPL-MCNC: 5.9 G/DL (ref 6–8.5)
RBC # BLD AUTO: 3.57 10*6/MM3 (ref 4.14–5.8)
SODIUM SERPL-SCNC: 137 MMOL/L (ref 136–145)
WBC NRBC COR # BLD AUTO: 6.19 10*3/MM3 (ref 3.4–10.8)

## 2024-08-17 PROCEDURE — 94761 N-INVAS EAR/PLS OXIMETRY MLT: CPT

## 2024-08-17 PROCEDURE — 94799 UNLISTED PULMONARY SVC/PX: CPT

## 2024-08-17 PROCEDURE — 85025 COMPLETE CBC W/AUTO DIFF WBC: CPT | Performed by: INTERNAL MEDICINE

## 2024-08-17 PROCEDURE — 99232 SBSQ HOSP IP/OBS MODERATE 35: CPT

## 2024-08-17 PROCEDURE — 71250 CT THORAX DX C-: CPT

## 2024-08-17 PROCEDURE — 94760 N-INVAS EAR/PLS OXIMETRY 1: CPT

## 2024-08-17 PROCEDURE — 94664 DEMO&/EVAL PT USE INHALER: CPT

## 2024-08-17 PROCEDURE — 93005 ELECTROCARDIOGRAM TRACING: CPT | Performed by: NURSE PRACTITIONER

## 2024-08-17 PROCEDURE — 71046 X-RAY EXAM CHEST 2 VIEWS: CPT

## 2024-08-17 PROCEDURE — 80053 COMPREHEN METABOLIC PANEL: CPT | Performed by: HOSPITALIST

## 2024-08-17 RX ORDER — BUSPIRONE HYDROCHLORIDE 15 MG/1
7.5 TABLET ORAL 3 TIMES DAILY
Status: DISCONTINUED | OUTPATIENT
Start: 2024-08-17 | End: 2024-08-19 | Stop reason: HOSPADM

## 2024-08-17 RX ORDER — HYDROXYZINE PAMOATE 25 MG/1
25 CAPSULE ORAL EVERY 4 HOURS PRN
Status: DISCONTINUED | OUTPATIENT
Start: 2024-08-17 | End: 2024-08-19 | Stop reason: HOSPADM

## 2024-08-17 RX ORDER — FUROSEMIDE 40 MG
40 TABLET ORAL DAILY
Status: DISCONTINUED | OUTPATIENT
Start: 2024-08-17 | End: 2024-08-18

## 2024-08-17 RX ORDER — BUDESONIDE AND FORMOTEROL FUMARATE DIHYDRATE 160; 4.5 UG/1; UG/1
2 AEROSOL RESPIRATORY (INHALATION)
Status: DISCONTINUED | OUTPATIENT
Start: 2024-08-17 | End: 2024-08-19 | Stop reason: HOSPADM

## 2024-08-17 RX ADMIN — Medication 10 ML: at 09:20

## 2024-08-17 RX ADMIN — LORAZEPAM 0.25 MG: 0.5 TABLET ORAL at 09:19

## 2024-08-17 RX ADMIN — RIVAROXABAN 15 MG: 15 TABLET, FILM COATED ORAL at 18:22

## 2024-08-17 RX ADMIN — SERTRALINE 100 MG: 100 TABLET, FILM COATED ORAL at 20:10

## 2024-08-17 RX ADMIN — Medication 10 ML: at 20:10

## 2024-08-17 RX ADMIN — BUSPIRONE HYDROCHLORIDE 7.5 MG: 15 TABLET ORAL at 20:10

## 2024-08-17 RX ADMIN — METOPROLOL SUCCINATE 12.5 MG: 25 TABLET, EXTENDED RELEASE ORAL at 09:19

## 2024-08-17 RX ADMIN — FOLIC ACID 1 MG: 1 TABLET ORAL at 09:19

## 2024-08-17 RX ADMIN — Medication 1 TABLET: at 09:19

## 2024-08-17 RX ADMIN — IPRATROPIUM BROMIDE AND ALBUTEROL SULFATE 3 ML: .5; 3 SOLUTION RESPIRATORY (INHALATION) at 11:17

## 2024-08-17 RX ADMIN — Medication 100 MG: at 09:19

## 2024-08-17 RX ADMIN — IPRATROPIUM BROMIDE AND ALBUTEROL SULFATE 3 ML: .5; 3 SOLUTION RESPIRATORY (INHALATION) at 15:30

## 2024-08-17 RX ADMIN — AMITRIPTYLINE HYDROCHLORIDE 25 MG: 25 TABLET, FILM COATED ORAL at 20:10

## 2024-08-17 RX ADMIN — PANTOPRAZOLE SODIUM 40 MG: 40 TABLET, DELAYED RELEASE ORAL at 09:19

## 2024-08-17 RX ADMIN — BUDESONIDE 0.5 MG: 0.5 INHALANT ORAL at 07:54

## 2024-08-17 RX ADMIN — IPRATROPIUM BROMIDE AND ALBUTEROL SULFATE 3 ML: .5; 3 SOLUTION RESPIRATORY (INHALATION) at 07:51

## 2024-08-17 RX ADMIN — PANTOPRAZOLE SODIUM 40 MG: 40 TABLET, DELAYED RELEASE ORAL at 18:22

## 2024-08-17 RX ADMIN — FUROSEMIDE 40 MG: 40 TABLET ORAL at 12:12

## 2024-08-17 RX ADMIN — ARFORMOTEROL TARTRATE 15 MCG: 15 SOLUTION RESPIRATORY (INHALATION) at 07:55

## 2024-08-17 RX ADMIN — BUSPIRONE HYDROCHLORIDE 7.5 MG: 15 TABLET ORAL at 18:22

## 2024-08-17 NOTE — PROGRESS NOTES
[facility] Progress Note      Patient Name: Javi Doran  : 1960  MRN: [id number]  Primary Care Physician:  Ginette Bryant MD  Date of admission: 8/10/2024    Subjective     Interval History:   Follow-up acute kidney injury.  Shortness of breath is improving; on 2 L/min  Still KNIGHT.    Review of Systems:   As noted above    Objective     Vitals:   Temp:  [97.3 °F (36.3 °C)-98.6 °F (37 °C)] 98.6 °F (37 °C)  Heart Rate:  [55-94] 94  Resp:  [17-18] 18  BP: (101-143)/(74-93) 143/93  Flow (L/min):  [2] 2    Intake/Output Summary (Last 24 hours) at 2024 0913  Last data filed at 2024 0715  Gross per 24 hour   Intake 960 ml   Output 350 ml   Net 610 ml       Physical Exam:    General: Awake, slightly restless.  Skin: warm and dry  HEENT: oral mucosa normal, nonicteric sclera  Neck: supple, no JVD  Lungs: A few crackles, unlabored on 2 L/min.  Heart: RRR, normal S1 and S2  Abdomen: soft, nontender, nondistended. +bs  : no palpable bladder  Extremities: Trace lower extremity edema  Neuro: Awake, alert.  Answers appropriately.    Scheduled Meds:     arformoterol, 15 mcg, Nebulization, BID - RT  budesonide, 0.5 mg, Nebulization, BID - RT  folic acid, 1 mg, Oral, Daily  ipratropium-albuterol, 3 mL, Nebulization, 4x Daily - RT  metoprolol succinate XL, 12.5 mg, Oral, Q24H  multivitamin with minerals, 1 tablet, Oral, Daily  pantoprazole, 40 mg, Oral, BID AC  rivaroxaban, 15 mg, Oral, Daily With Dinner  sertraline, 100 mg, Oral, Nightly  sodium chloride, 10 mL, Intravenous, Q12H  thiamine, 100 mg, Oral, Daily      IV Meds:        Results Reviewed:   I have personally reviewed the results from the time of this admission to 2024 09:13 EDT     Results from last 7 days   Lab Units 24  0424 24  0554 08/15/24  1552 08/15/24  0408   SODIUM mmol/L 137 137  --  137   POTASSIUM mmol/L 4.3 3.6 3.5 3.1*   CHLORIDE mmol/L 104 101  --  98   CO2 mmol/L  23.6 24.8  --  26.0   BUN mg/dL 28* 32*  --  35*   CREATININE mg/dL 1.53* 1.85*  --  2.02*   CALCIUM mg/dL 9.0 9.0  --  8.8   BILIRUBIN mg/dL 2.1* 2.4*  --  2.5*   ALK PHOS U/L 279* 287*  --  315*   ALT (SGPT) U/L 123* 112*  --  93*   AST (SGOT) U/L 166* 182*  --  145*   GLUCOSE mg/dL 96 89  --  88       Estimated Creatinine Clearance: 53.8 mL/min (A) (by C-G formula based on SCr of 1.53 mg/dL (H)).    Results from last 7 days   Lab Units 08/16/24  0554 08/15/24  0408 08/14/24  0615   MAGNESIUM mg/dL 2.3 2.2 2.3   PHOSPHORUS mg/dL 3.0 2.5 2.8             Results from last 7 days   Lab Units 08/17/24  0424 08/16/24  0554 08/14/24  0615 08/13/24  0443 08/12/24  0619   WBC 10*3/mm3 6.19 6.63 8.86 7.80 9.72   HEMOGLOBIN g/dL 9.6* 9.6* 10.3* 9.5* 9.7*   PLATELETS 10*3/mm3 148 162 237 257 256       Results from last 7 days   Lab Units 08/16/24  0938 08/10/24  1141   INR  3.00* 3.30*       Assessment / Plan     ASSESSMENT:  EDMOND, nonoliguric, improving and multifactorial: hypotension and decompensated systolic heart failure, along with impaired renal autoregulation due to ARB.  Improving volume excess.  Creatinine improved to 1.53 today.  Restart lasix 40mg/day.  Alcohol abuse and withdrawal.  On CIWA protocol.  Alcoholic hepatitis.  Transaminases worse.    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  Acute on chronic ischemic systolic cardiomyopathy.  Prior stents.  Echo at [facility] 7/3/2024 with EF 30 to 35%, akinetic anteroseptal apical and anteroapical walls.  Prior history of hypertrophic cardiomyopathy with myomectomy and maze procedure as well as mitral valve replacement.  7.  Anemia low iron    Jaylon Barnes MD  08/17/24  09:13 EDT    [facility]  [phone]

## 2024-08-17 NOTE — PROGRESS NOTES
LOS: 6 days   Patient Care Team:  Ginette Bryant MD as PCP - General (Family Medicine)    Chief Complaint: follow up alcohol abuse and withdrawal, acute on systolic CHF, HOCM, LBBB    Interval History: He is resting in bed on my exam. He denies chest pain or discomfort, palpitations, or shortness of breath. He reports that he feels very anxious this morning.     Vital Signs:  Temp:  [97.3 °F (36.3 °C)-98.6 °F (37 °C)] 98.6 °F (37 °C)  Heart Rate:  [55-94] 94  Resp:  [17-18] 18  BP: (101-143)/(74-93) 143/93    Intake/Output Summary (Last 24 hours) at 8/17/2024 0836  Last data filed at 8/17/2024 0715  Gross per 24 hour   Intake 960 ml   Output 350 ml   Net 610 ml        Physical Exam  Vitals reviewed.   Constitutional:       General: He is not in acute distress.  HENT:      Head: Normocephalic.      Nose: Nose normal.   Eyes:      Extraocular Movements: Extraocular movements intact.      Pupils: Pupils are equal, round, and reactive to light.   Cardiovascular:      Rate and Rhythm: Normal rate and regular rhythm.      Pulses: Normal pulses.      Heart sounds: Normal heart sounds. Heart sounds not distant. No murmur heard.     No friction rub. No gallop. No S3 or S4 sounds.   Pulmonary:      Effort: Pulmonary effort is normal.      Breath sounds: Normal breath sounds.   Abdominal:      General: Abdomen is flat. Bowel sounds are normal.      Palpations: Abdomen is soft.      Tenderness: There is no abdominal tenderness.   Skin:     General: Skin is warm and dry.   Neurological:      General: No focal deficit present.      Mental Status: He is alert and oriented to person, place, and time. Mental status is at baseline.   Psychiatric:         Mood and Affect: Mood normal.         Behavior: Behavior normal.         Results Review:    Results from last 7 days   Lab Units 08/17/24  0424   SODIUM mmol/L 137   POTASSIUM mmol/L 4.3   CHLORIDE mmol/L 104   CO2 mmol/L 23.6   BUN mg/dL 28*   CREATININE mg/dL  1.53*   GLUCOSE mg/dL 96   CALCIUM mg/dL 9.0     Results from last 7 days   Lab Units 08/10/24  1527 08/10/24  1141   HSTROP T ng/L 40* 50*     Results from last 7 days   Lab Units 08/17/24  0424   WBC 10*3/mm3 6.19   HEMOGLOBIN g/dL 9.6*   HEMATOCRIT % 30.1*   PLATELETS 10*3/mm3 148     Results from last 7 days   Lab Units 08/16/24  0938 08/10/24  1141   INR  3.00* 3.30*   APTT seconds  --  46.6*         Results from last 7 days   Lab Units 08/16/24  0554   MAGNESIUM mg/dL 2.3           I reviewed the patient's new clinical results.        Assessment & Plan:  Acute on chronic systolic CHF   EF in July 2024 was 34%, on echocardiogram on 8/15/2024 EF 54%  Guideline directed medical therapy is limited by hypotension and renal insufficiency.  He is on minimal metoprolol succinate  Nephrology is managing diuretics, appreciate their expertise  History of hypertrophic cardiomyopathy status post myectomy  Paroxysmal atrial fibrillation  Status post MAZE procedure at time of myectomy  On rivaroxaban  Mitral valve regurgitation status post mitral valve repair  Coronary artery disease with multiple prior percutaneous interventions  LBBB, first-degree AV block  Prolonged QT interval  Amiodarone stopped on 8/14/2024  Remains prolonged but is improving  Acute kidney injury  Waste products peaked at 2, 1.53 today.  Alcohol abuse with withdrawal  Emphysema by CT scan  Suspected pulmonary hypertension  Hypokalemia  Coagulopathy secondary to liver disease  Alcoholic hepatitis  Statin stopped  Multifactorial anemia  Pleural effusions, right greater than left  Diuretic therapy is limited by his renal dysfunction. May not be able to mobilize this much more, may need a thoracentesis. This is complicated by his elevated INR, was 3.0 yesterday.    Ibis Hendrix, APRN  08/17/24  08:36 EDT

## 2024-08-17 NOTE — PROGRESS NOTES
[facility] follow up d/t EtOH; this writer reviewed chart and spoke with RN Louann. Per RN, patient is doing fairly well; he was given Ativan for anxiety and able to sleep.  Psychiatrist, Dr. Helms, consult ordered for today due to severe anxiety; last CIWA was 0 at 00:09.  Patient has [facility] resources and plans to attend AA; discharge home with family versus home with home health versus SNF placement. No further needs/concerns noted at this time per RN and/or medical team; [facility] [place] to continue following.

## 2024-08-17 NOTE — CONSULTS
IDENTIFYING INFORMATION: The patient is a 64-year-old white male admitted with complaints of shortness of air and leg swelling.  He is seen by psychiatry related to history of alcohol abuse as well as anxiety and depression    CHIEF COMPLAINT: None given    INFORMANT: Patient and chart    RELIABILITY: Fair    HISTORY OF PRESENT ILLNESS: The patient is a 64-year-old  white male admitted with complaints of shortness of air and leg swelling.  He has a longstanding history of alcohol abuse and tobaccos use as well as congestive heart failure.  He was recently seen at [facility] under similar circumstances and was treated at that facility for alcoholic ketoacidosis.  During today's interview, the patient minimizes his alcohol use though he presented to the emergency department with a blood alcohol of 0.306 in July of this year.  His drug screen was also positive for cannabis.  The patient has been  for 12 years after learning that his wife had been unfaithful.  He reports that he worries constantly about his children and other life factors.  The patient currently does not work stating that he receives disability secondary to his history of heart issues.    PAST PSYCHIATRIC HISTORY: The patient reports no psychiatric history though the chart indicates that he has been prescribed sertraline.  He also reports that he has been prescribed BuSpar in the past    PAST MEDICAL HISTORY: Significant for the aforementioned issues as well as paroxysmal atrial fibrillation, hypertrophic cardiomyopathy, history of mitral valve repair and atrial fibrillation.  His liver indices are all greatly elevated consistent with alcohol abuse    MEDICATIONS:   Current Facility-Administered Medications   Medication Dose Route Frequency Provider Last Rate Last Admin    acetaminophen (TYLENOL) tablet 650 mg  650 mg Oral Q4H PRN Mp Sethi MD   650 mg at 08/11/24 1802    Or    acetaminophen (TYLENOL) 160 MG/5ML oral  solution 650 mg  650 mg Oral Q4H PRN Mp Sethi MD        Or    acetaminophen (TYLENOL) suppository 650 mg  650 mg Rectal Q4H PRN Mp Sethi MD        albuterol (PROVENTIL) nebulizer solution 0.083% 2.5 mg/3mL  2.5 mg Nebulization Q6H PRN Mp Sethi MD   2.5 mg at 08/15/24 0107    amitriptyline (ELAVIL) tablet 25 mg  25 mg Oral Nightly Madi Helms III, MD        sennosides-docusate (PERICOLACE) 8.6-50 MG per tablet 2 tablet  2 tablet Oral BID PRN Mp Sethi MD        And    polyethylene glycol (MIRALAX) packet 17 g  17 g Oral Daily PRN Mp Sethi MD        And    bisacodyl (DULCOLAX) EC tablet 5 mg  5 mg Oral Daily PRN Mp Sethi MD        And    bisacodyl (DULCOLAX) suppository 10 mg  10 mg Rectal Daily PRN Mp Sethi MD        budesonide-formoterol (SYMBICORT) 160-4.5 MCG/ACT inhaler 2 puff  2 puff Inhalation BID - RT Hal Mckeon MD        busPIRone (BUSPAR) tablet 15 mg  15 mg Oral BID PRN Mp Sethi MD   15 mg at 08/16/24 0010    busPIRone (BUSPAR) tablet 7.5 mg  7.5 mg Oral TID Madi Helms III, MD        folic acid (FOLVITE) tablet 1 mg  1 mg Oral Daily Reggie Minor MD   1 mg at 08/17/24 0919    furosemide (LASIX) tablet 40 mg  40 mg Oral Daily Jaylon Barnes MD   40 mg at 08/17/24 1212    hydrOXYzine pamoate (VISTARIL) capsule 25 mg  25 mg Oral Q4H PRN Madi Helms III, MD        ipratropium-albuterol (DUO-NEB) nebulizer solution 3 mL  3 mL Nebulization 4x Daily - RT Wilbur Singh MD   3 mL at 08/17/24 1530    LORazepam (ATIVAN) tablet 0.25 mg  0.25 mg Oral BID JULIANNAN Pasha Wells MD   0.25 mg at 08/17/24 0919    metoprolol succinate XL (TOPROL-XL) 24 hr tablet 12.5 mg  12.5 mg Oral Q24H Katina Waldron MD   12.5 mg at 08/17/24 0919    multivitamin with minerals 1 tablet  1 tablet Oral Daily Reggie Minor MD   1 tablet at 08/17/24  0919    nitroglycerin (NITROSTAT) SL tablet 0.4 mg  0.4 mg Sublingual Q5 Min PRN Mp Sethi MD        ondansetron ODT (ZOFRAN-ODT) disintegrating tablet 4 mg  4 mg Oral Q6H PRN Mp Sethi MD   4 mg at 08/16/24 1251    pantoprazole (PROTONIX) EC tablet 40 mg  40 mg Oral BID AC Reggie Minor MD   40 mg at 08/17/24 0919    rivaroxaban (XARELTO) tablet 15 mg  15 mg Oral Daily With Dinner Mp Sethi MD   15 mg at 08/16/24 1754    sertraline (ZOLOFT) tablet 100 mg  100 mg Oral Nightly Mp Sethi MD   100 mg at 08/16/24 2038    sodium chloride 0.9 % flush 10 mL  10 mL Intravenous Q12H Mp Sethi MD   10 mL at 08/17/24 0920    sodium chloride 0.9 % flush 10 mL  10 mL Intravenous PRN Mp Sethi MD        sodium chloride 0.9 % infusion 40 mL  40 mL Intravenous PRN Mp Sethi MD        thiamine (VITAMIN B-1) tablet 100 mg  100 mg Oral Daily Reggie Minor MD   100 mg at 08/17/24 0919    tiotropium (SPIRIVA RESPIMAT) 2.5 mcg/act aerosol solution inhaler  2 puff Inhalation Daily - RT Hal Mckeon MD             ALLERGIES: None    FAMILY HISTORY: None reported    SOCIAL HISTORY: The patient is  and lives alone.  He receives disability secondary to cardiac issues.  He minimizes his substance use during today's interview.    MENTAL STATUS EXAM: The patient is a thin somewhat ill appearing white male dressed in hospital garb.  He is awake alert and oriented spheres.  His mood is mildly dysphoric his affect congruent.  Speech is generally relevant and coherent.  There are no deficits in memory or cognition noted.  Intelligence is judged to be in the average range based on fund of knowledge, the patient is cooperative with interview.  He denies current suicidal or homicidal ideation or psychotic features.  Judgment and insight are reasonably intact.  Field    ASSETS/LIABILITIES: To be assessed/ongoing alcohol  "abuse    DIAGNOSTIC IMPRESSION: Alcohol use disorder, anxiety disorder unspecified depressive disorder unspecified, medical problems as noted previously    PLAN: I will continue the patient's previous prescribed sertraline and will add BuSpar 7.5 mg 3 times daily.  Additionally I will add Vistaril 25 mg every 4 hours as needed anxiety and amitriptyline 25 mg nightly as needed insomnia (the patient reports a history of poor response to trazodone).  There is notable drug-seeking during the patient's interview today as he states that \"Ativan is the only thing that helps\".  I have informed the patient that he is not a candidate for lorazepam given his extensive substance abuse history, and that finding an outpatient provider who would provide this medication would be next to impossible..  "

## 2024-08-17 NOTE — PROGRESS NOTES
Name: Javi Doran ADMIT: 8/10/2024   : 1960  PCP: Ginette Bryant MD    MRN: [id number] LOS: 6 days   AGE/SEX: 64 y.o. male  ROOM: [facility]     Subjective   Subjective   Patient is lying on the bed and is chronically ill-appearing and continues to complain of dyspnea although does not appear to be any respiratory distress and is currently on 2 L of oxygen.     Objective   Objective   Vital Signs  Temp:  [97.3 °F (36.3 °C)-98.6 °F (37 °C)] 97.3 °F (36.3 °C)  Heart Rate:  [55-94] 60  Resp:  [18] 18  BP: (101-143)/(74-93) 117/85  SpO2:  [99 %-100 %] 99 %  on  Flow (L/min):  [2] 2;   Device (Oxygen Therapy): nasal cannula  Body mass index is 22.99 kg/m².  Physical Exam  Vitals reviewed.   Constitutional:       General: He is not in acute distress.     Appearance: He is well-developed. He is ill-appearing.      Comments: Drowsy   HENT:      Head: Normocephalic and atraumatic.   Eyes:      General: Scleral icterus present.   Cardiovascular:      Rate and Rhythm: Normal rate and regular rhythm.      Heart sounds: No murmur heard.  Pulmonary:      Effort: Pulmonary effort is normal. No respiratory distress.      Breath sounds: Normal breath sounds. No wheezing.   Abdominal:      General: There is distension.      Palpations: Abdomen is soft.      Tenderness: There is no abdominal tenderness.   Musculoskeletal:      Right lower leg: No edema.      Left lower leg: No edema.   Skin:     General: Skin is warm and dry.      Coloration: Skin is pale.      Findings: No rash.   Neurological:      Comments: No real tremor or asterixis       Results Review     I reviewed the patient's new clinical results.  Results from last 7 days   Lab Units 24  0424 24  0554 24  0615 24  0443   WBC 10*3/mm3 6.19 6.63 8.86 7.80   HEMOGLOBIN g/dL 9.6* 9.6* 10.3* 9.5*   PLATELETS 10*3/mm3 148 162 237 257     Results from last 7 days   Lab Units 24  0424 24  0554 08/15/24  1552  "08/15/24  0408 08/14/24  0615   SODIUM mmol/L 137 137  --  137 137   POTASSIUM mmol/L 4.3 3.6 3.5 3.1* 4.0   CHLORIDE mmol/L 104 101  --  98 99   CO2 mmol/L 23.6 24.8  --  26.0 22.7   BUN mg/dL 28* 32*  --  35* 34*   CREATININE mg/dL 1.53* 1.85*  --  2.02* 1.95*   GLUCOSE mg/dL 96 89  --  88 94   EGFR mL/min/1.73 50.8* 40.4*  --  36.4* 37.9*     Results from last 7 days   Lab Units 08/17/24  0424 08/16/24  0554 08/15/24  0408 08/14/24  0615   ALBUMIN g/dL 3.3* 3.4* 3.7 3.5   BILIRUBIN mg/dL 2.1* 2.4* 2.5* 2.7*   ALK PHOS U/L 279* 287* 315* 308*   AST (SGOT) U/L 166* 182* 145* 170*   ALT (SGPT) U/L 123* 112* 93* 94*     Results from last 7 days   Lab Units 08/17/24  0424 08/16/24  0554 08/15/24  0408 08/14/24  0615 08/13/24  0443   CALCIUM mg/dL 9.0 9.0 8.8 8.7 8.2*   ALBUMIN g/dL 3.3* 3.4* 3.7 3.5 3.5   MAGNESIUM mg/dL  --  2.3 2.2 2.3 2.4   PHOSPHORUS mg/dL  --  3.0 2.5 2.8 2.5       No results found for: \"HGBA1C\", \"POCGLU\"    No radiology results for the last day    I have personally reviewed all medications:  Scheduled Medications  budesonide-formoterol, 2 puff, Inhalation, BID - RT  folic acid, 1 mg, Oral, Daily  furosemide, 40 mg, Oral, Daily  ipratropium-albuterol, 3 mL, Nebulization, 4x Daily - RT  metoprolol succinate XL, 12.5 mg, Oral, Q24H  multivitamin with minerals, 1 tablet, Oral, Daily  pantoprazole, 40 mg, Oral, BID AC  rivaroxaban, 15 mg, Oral, Daily With Dinner  sertraline, 100 mg, Oral, Nightly  sodium chloride, 10 mL, Intravenous, Q12H  thiamine, 100 mg, Oral, Daily  tiotropium bromide monohydrate, 2 puff, Inhalation, Daily - RT    Infusions   Diet  Diet: Regular/House, Cardiac; Healthy Heart (2-3 Na+); Fluid Consistency: Thin (IDDSI 0)    I have personally reviewed:  [x]  Laboratory   []  Microbiology   [x]  Radiology   []  EKG/Telemetry  [x]  Cardiology/Vascular   []  Pathology    []  Records       Assessment/Plan     Active Hospital Problems    Diagnosis  POA    EDMOND (acute kidney injury) " [N17.9]  Yes    Alcohol withdrawal syndrome without complication [F10.930]  Yes    Essential hypertension [I10]  Yes    Anemia [D64.9]  Yes    Acute on chronic systolic congestive heart failure [I50.23]  Yes    Anxiety [F41.9]  Yes      Resolved Hospital Problems    Diagnosis Date Resolved POA    **Hypokalemia [E87.6] 08/14/2024 Yes       64 y.o. male with history of alcohol abuse and NICM admitted with dyspnea, EDMOND and Hypokalemia.    Dyspnea: CT chest is suggestive of only small pleural effusion with loculated pleural fluid seen in the left major fissure.  There is evidence of emphysema and small multifocal opacities were also noted.  No evidence of any infiltrate as such or a PE.  On Brovana and Spiriva which will be continued..  Currently on 2 L of oxygen which will be continued.  2D echo during his hospital stay revealed ejection fraction of 54 percent.  Pulmonary plans on thoracentesis if unable to be diuresed and diuretics initiated today.    PAF status post ablation: Given mild QT prolongation amiodarone was discontinued by cardiology and is currently on Xarelto and Toprol low-dose.    EDMOND stable: Creatinine peaked at 2.02 and started improving and noted to be at 1.53 today nephrology is following along.  Baseline creatinine is around 1.1 and avoid nephrotoxins.    ETOH abuse/withdrawal-continue thiamine/folate/MVI.  Currently not going through any withdrawal and is on a CIWA protocol.    Alcoholic hepatitis: LFTs continue to take upward slightly.  Continue to monitor daily  - Checked ammonia which is normal    Anemia- No overt bleeding though obviously some risk given ETOH use.  H&H stable  - Continue PPI and monitor daily  - Hold AC if any overt bleeding    Severe anxiety  -Consulted psychiatry    SCD/Xarelto  Dispo: TBD.      Pasha Wells MD  [facility]  08/17/24  14:36 EDT

## 2024-08-17 NOTE — PROGRESS NOTES
[facility] INPATIENT PROGRESS NOTE         [address]    2024      PATIENT IDENTIFICATION:  Name: Javi Doran ADMIT: 8/10/2024   : 1960  PCP: Ginette Bryant MD    MRN: [id number] LOS: 6 days   AGE/SEX: 64 y.o. male  ROOM: [facility]                     LOS 6    Reason for visit: Shortness of breath with pulmonary filtrates and pleural effusions      SUBJECTIVE:      Complains of increased anxiety.  Gets very short of breath with any activity says.  On 2 L supplemental oxygen.  Diminished breath sounds but no wheezing or rhonchi on auscultation. I am seeing the patient for the first time today.  All patient problems are new to me.      Objective   OBJECTIVE:    Vital Sign Min/Max for last 24 hours  Temp  Min: 97.3 °F (36.3 °C)  Max: 98.6 °F (37 °C)   BP  Min: 101/74  Max: 143/93   Pulse  Min: 55  Max: 94   Resp  Min: 17  Max: 18   SpO2  Min: 99 %  Max: 100 %   No data recorded   Weight  Min: 76.9 kg (169 lb 8.5 oz)  Max: 76.9 kg (169 lb 8.5 oz)    Vitals:    24 2314 24 0600 24 0715 24 0751   BP: 101/74  143/93    BP Location: Right arm  Right arm    Patient Position: Lying  Lying    Pulse: 70  94    Resp: 18  18 18   Temp: 97.5 °F (36.4 °C)  98.6 °F (37 °C)    TempSrc: Oral  Oral    SpO2: 99%  99%    Weight:  76.9 kg (169 lb 8.5 oz)     Height:                08/15/24  0443 08/15/24  1536 24  0600   Weight: 74.9 kg (165 lb 2 oz) 74.8 kg (165 lb) 76.9 kg (169 lb 8.5 oz)       Body mass index is 22.99 kg/m².                          Body mass index is 22.99 kg/m².    Intake/Output Summary (Last 24 hours) at 2024 1108  Last data filed at 2024 0919  Gross per 24 hour   Intake 810 ml   Output 350 ml   Net 460 ml         Exam:  GEN:  No distress, appears stated age  EYES:   PERRL, anicteric sclerae  ENT:    External ears/nose normal, OP clear  NECK:  No adenopathy, midline trachea  LUNGS: Normal chest on inspection, palpation  and diminished breath sounds at bases on auscultation  CV:  Normal S1S2, without murmur  ABD:  Nontender, nondistended, no hepatosplenomegaly, +BS  EXT:  No edema.  No cyanosis or clubbing.  No mottling and normal cap refill.    Assessment     Scheduled meds:  arformoterol, 15 mcg, Nebulization, BID - RT  budesonide, 0.5 mg, Nebulization, BID - RT  folic acid, 1 mg, Oral, Daily  furosemide, 40 mg, Oral, Daily  ipratropium-albuterol, 3 mL, Nebulization, 4x Daily - RT  metoprolol succinate XL, 12.5 mg, Oral, Q24H  multivitamin with minerals, 1 tablet, Oral, Daily  pantoprazole, 40 mg, Oral, BID AC  rivaroxaban, 15 mg, Oral, Daily With Dinner  sertraline, 100 mg, Oral, Nightly  sodium chloride, 10 mL, Intravenous, Q12H  thiamine, 100 mg, Oral, Daily      IV meds:                         Data Review:  Results from last 7 days   Lab Units 08/17/24  0424 08/16/24  0554 08/15/24  1552 08/15/24  0408 08/14/24  0615 08/13/24  0443   SODIUM mmol/L 137 137  --  137 137 134*   POTASSIUM mmol/L 4.3 3.6 3.5 3.1* 4.0 3.5   CHLORIDE mmol/L 104 101  --  98 99 96*   CO2 mmol/L 23.6 24.8  --  26.0 22.7 23.3   BUN mg/dL 28* 32*  --  35* 34* 34*   CREATININE mg/dL 1.53* 1.85*  --  2.02* 1.95* 1.84*   GLUCOSE mg/dL 96 89  --  88 94 101*   CALCIUM mg/dL 9.0 9.0  --  8.8 8.7 8.2*         Estimated Creatinine Clearance: 53.8 mL/min (A) (by C-G formula based on SCr of 1.53 mg/dL (H)).  Results from last 7 days   Lab Units 08/17/24  0424 08/16/24  0554 08/14/24  0615 08/13/24  0443 08/12/24  0619   WBC 10*3/mm3 6.19 6.63 8.86 7.80 9.72   HEMOGLOBIN g/dL 9.6* 9.6* 10.3* 9.5* 9.7*   PLATELETS 10*3/mm3 148 162 237 257 256     Results from last 7 days   Lab Units 08/16/24  0938 08/10/24  1141   INR  3.00* 3.30*     Results from last 7 days   Lab Units 08/17/24  0424 08/16/24  0554 08/15/24  0408 08/14/24  0615 08/13/24  0443   ALT (SGPT) U/L 123* 112* 93* 94* 74*   AST (SGOT) U/L 166* 182* 145* 170* 141*     Results from last 7 days   Lab Units  "08/15/24  1719   PH, ARTERIAL pH units 7.439   PO2 ART mm Hg 59.9*   PCO2, ARTERIAL mm Hg 38.7   HCO3 ART mmol/L 26.2             No results found for: \"HGBA1C\", \"POCGLU\"      Imaging reviewed  2D echo reviewed: EF 55%    CT chest 8/15 reviewed small pleural effusions with emphysema and multifocal infiltrates.  Moderate cardiomegaly noted.  Enlarged pulmonary arteries.    Microbiology reviewed  RVP negative          Active Hospital Problems    Diagnosis  POA    EDMOND (acute kidney injury) [N17.9]  Yes    Alcohol withdrawal syndrome without complication [F10.930]  Yes    Essential hypertension [I10]  Yes    Anemia [D64.9]  Yes    Acute on chronic systolic congestive heart failure [I50.23]  Yes    Anxiety [F41.9]  Yes      Resolved Hospital Problems    Diagnosis Date Resolved POA    **Hypokalemia [E87.6] 08/14/2024 Yes         ASSESSMENT:  AMS  Dyspnea  Bilateral pleural effusions  Patchy bilateral pulmonary infiltrates  Bibasilar infiltrates, likely atelectasis  COPD, requires PFT's  Current cig smoker  EDMOND  Acute on chronic HFrEF, EF 34%: Repeat 55%  Afib  Hx MV repair  CAD with hx PCI  Alcoholism  Anemia      PLAN:  Changing nebulized medicines to Symbicort and Spiriva.  Repeat chest x-ray given patient's continued complaints of shortness of breath with any activity.  On diuretics per cardiology and admitting service.    Hal Mckeon MD  Pulmonary and Critical Care Medicine  [facility]  8/17/2024    11:08 EDT     "

## 2024-08-17 NOTE — PLAN OF CARE
Problem: Adult Inpatient Plan of Care  Goal: Patient-Specific Goal (Individualized)  Outcome: Ongoing, Progressing     Problem: Adult Inpatient Plan of Care  Goal: Absence of Hospital-Acquired Illness or Injury  Intervention: Identify and Manage Fall Risk  Recent Flowsheet Documentation  Taken 8/17/2024 0400 by Louann Ochoa RN  Safety Promotion/Fall Prevention:   safety round/check completed   room organization consistent  Taken 8/17/2024 0200 by Louann Ochoa RN  Safety Promotion/Fall Prevention: safety round/check completed  Taken 8/17/2024 0009 by Louann Ochoa RN  Safety Promotion/Fall Prevention: safety round/check completed  Taken 8/16/2024 2210 by Louann Ochoa RN  Safety Promotion/Fall Prevention: safety round/check completed  Taken 8/16/2024 2014 by Louann Ochoa RN  Safety Promotion/Fall Prevention:   safety round/check completed   room organization consistent   clutter free environment maintained     Problem: Adult Inpatient Plan of Care  Goal: Absence of Hospital-Acquired Illness or Injury  Intervention: Prevent Skin Injury  Recent Flowsheet Documentation  Taken 8/17/2024 0400 by Louann Ochoa RN  Body Position:   position changed independently   weight shifting  Taken 8/17/2024 0200 by Louann Ochoa RN  Body Position:   side-lying   position changed independently  Taken 8/17/2024 0009 by Louann Ochoa RN  Body Position:   side-lying   position changed independently   weight shifting  Taken 8/16/2024 2210 by Louann Ochoa RN  Body Position:   side-lying   position changed independently  Taken 8/16/2024 2014 by Louann Ochoa RN  Body Position: position changed independently  Skin Protection: adhesive use limited     Problem: Adult Inpatient Plan of Care  Goal: Optimal Comfort and Wellbeing  Outcome: Ongoing, Progressing     Problem: Adult Inpatient Plan of Care  Goal: Readiness for Transition of Care  Outcome: Ongoing, Progressing     Problem: Fall Injury Risk  Goal: Absence of Fall and  Fall-Related Injury  Intervention: Promote Injury-Free Environment  Recent Flowsheet Documentation  Taken 8/17/2024 0400 by Louann Ochoa RN  Safety Promotion/Fall Prevention:   safety round/check completed   room organization consistent  Taken 8/17/2024 0200 by Louann Ochoa RN  Safety Promotion/Fall Prevention: safety round/check completed  Taken 8/17/2024 0009 by Louann Ochoa RN  Safety Promotion/Fall Prevention: safety round/check completed  Taken 8/16/2024 2210 by Louann Ochoa RN  Safety Promotion/Fall Prevention: safety round/check completed  Taken 8/16/2024 2014 by Louann Ochoa RN  Safety Promotion/Fall Prevention:   safety round/check completed   room organization consistent   clutter free environment maintained     Problem: Pain Acute  Goal: Acceptable Pain Control and Functional Ability  Intervention: Prevent or Manage Pain  Recent Flowsheet Documentation  Taken 8/17/2024 0400 by Louann Ochoa RN  Sleep/Rest Enhancement:   room darkened   awakenings minimized  Taken 8/17/2024 0200 by Louann Ochoa RN  Sleep/Rest Enhancement: room darkened  Taken 8/17/2024 0009 by Louann Ochoa RN  Sleep/Rest Enhancement:   room darkened   regular sleep/rest pattern promoted  Taken 8/16/2024 2100 by Louann Ochoa RN  Sleep/Rest Enhancement: regular sleep/rest pattern promoted  Taken 8/16/2024 2014 by Louann Ochoa RN  Sensory Stimulation Regulation:   television on   lighting decreased   care clustered  Medication Review/Management: medications reviewed     Problem: Alcohol Withdrawal  Goal: Alcohol Withdrawal Symptom Control  Outcome: Ongoing, Progressing     Problem: Acute Neurologic Deterioration (Alcohol Withdrawal)  Goal: Optimal Neurologic Function  Intervention: Prevent Seizure-Related Injury  Recent Flowsheet Documentation  Taken 8/17/2024 0009 by Louann Ochoa RN  Seizure Precautions: side rails padded  Taken 8/16/2024 2014 by Louann Ochoa RN  Seizure Precautions: side rails padded     Problem:  Substance Misuse (Alcohol Withdrawal)  Goal: Readiness for Change Identified  Intervention: Promote Psychosocial Wellbeing  Recent Flowsheet Documentation  Taken 8/16/2024 2100 by Louann Ochoa RN  Family/Support System Care: self-care encouraged   Goal Outcome Evaluation:  Plan of Care Reviewed With: patient           Outcome Evaluation: A/Giles, vss, brother visited with pt stated that he felt loved by his brother and is thankful for him. He said that growing up they never really spoke about love to each other and it just feel stranges to know that they feels so good abouth each other. Later in the shift he said he felt anxious and prn medication was given and he fell asleep. When he woke up he needed a little reassurance and he returned to sleep and slept without any problem. DENISE.

## 2024-08-18 LAB
ALBUMIN SERPL-MCNC: 3.6 G/DL (ref 3.5–5.2)
ALBUMIN/GLOB SERPL: 1.4 G/DL
ALP SERPL-CCNC: 304 U/L (ref 39–117)
ALT SERPL W P-5'-P-CCNC: 121 U/L (ref 1–41)
ANION GAP SERPL CALCULATED.3IONS-SCNC: 9 MMOL/L (ref 5–15)
AST SERPL-CCNC: 132 U/L (ref 1–40)
BASOPHILS # BLD AUTO: 0.03 10*3/MM3 (ref 0–0.2)
BASOPHILS NFR BLD AUTO: 0.5 % (ref 0–1.5)
BILIRUB SERPL-MCNC: 2.2 MG/DL (ref 0–1.2)
BUN SERPL-MCNC: 29 MG/DL (ref 8–23)
BUN/CREAT SERPL: 17.7 (ref 7–25)
CALCIUM SPEC-SCNC: 9.1 MG/DL (ref 8.6–10.5)
CHLORIDE SERPL-SCNC: 105 MMOL/L (ref 98–107)
CO2 SERPL-SCNC: 26 MMOL/L (ref 22–29)
CREAT SERPL-MCNC: 1.64 MG/DL (ref 0.76–1.27)
DEPRECATED RDW RBC AUTO: 61.1 FL (ref 37–54)
EGFRCR SERPLBLD CKD-EPI 2021: 46.7 ML/MIN/1.73
EOSINOPHIL # BLD AUTO: 0.11 10*3/MM3 (ref 0–0.4)
EOSINOPHIL NFR BLD AUTO: 2 % (ref 0.3–6.2)
ERYTHROCYTE [DISTWIDTH] IN BLOOD BY AUTOMATED COUNT: 19.6 % (ref 12.3–15.4)
GLOBULIN UR ELPH-MCNC: 2.6 GM/DL
GLUCOSE SERPL-MCNC: 80 MG/DL (ref 65–99)
HCT VFR BLD AUTO: 32.4 % (ref 37.5–51)
HGB BLD-MCNC: 10.1 G/DL (ref 13–17.7)
IMM GRANULOCYTES # BLD AUTO: 0.03 10*3/MM3 (ref 0–0.05)
IMM GRANULOCYTES NFR BLD AUTO: 0.5 % (ref 0–0.5)
LYMPHOCYTES # BLD AUTO: 0.73 10*3/MM3 (ref 0.7–3.1)
LYMPHOCYTES NFR BLD AUTO: 13 % (ref 19.6–45.3)
MCH RBC QN AUTO: 26.9 PG (ref 26.6–33)
MCHC RBC AUTO-ENTMCNC: 31.2 G/DL (ref 31.5–35.7)
MCV RBC AUTO: 86.2 FL (ref 79–97)
MONOCYTES # BLD AUTO: 0.57 10*3/MM3 (ref 0.1–0.9)
MONOCYTES NFR BLD AUTO: 10.2 % (ref 5–12)
NEUTROPHILS NFR BLD AUTO: 4.13 10*3/MM3 (ref 1.7–7)
NEUTROPHILS NFR BLD AUTO: 73.8 % (ref 42.7–76)
NRBC BLD AUTO-RTO: 0 /100 WBC (ref 0–0.2)
PLATELET # BLD AUTO: 152 10*3/MM3 (ref 140–450)
PMV BLD AUTO: 9.6 FL (ref 6–12)
POTASSIUM SERPL-SCNC: 4 MMOL/L (ref 3.5–5.2)
PROT SERPL-MCNC: 6.2 G/DL (ref 6–8.5)
RBC # BLD AUTO: 3.76 10*6/MM3 (ref 4.14–5.8)
SODIUM SERPL-SCNC: 140 MMOL/L (ref 136–145)
WBC NRBC COR # BLD AUTO: 5.6 10*3/MM3 (ref 3.4–10.8)

## 2024-08-18 PROCEDURE — 94664 DEMO&/EVAL PT USE INHALER: CPT

## 2024-08-18 PROCEDURE — 93005 ELECTROCARDIOGRAM TRACING: CPT | Performed by: NURSE PRACTITIONER

## 2024-08-18 PROCEDURE — 94799 UNLISTED PULMONARY SVC/PX: CPT

## 2024-08-18 PROCEDURE — 85025 COMPLETE CBC W/AUTO DIFF WBC: CPT | Performed by: INTERNAL MEDICINE

## 2024-08-18 PROCEDURE — 94760 N-INVAS EAR/PLS OXIMETRY 1: CPT

## 2024-08-18 PROCEDURE — 99232 SBSQ HOSP IP/OBS MODERATE 35: CPT

## 2024-08-18 PROCEDURE — 80053 COMPREHEN METABOLIC PANEL: CPT | Performed by: HOSPITALIST

## 2024-08-18 PROCEDURE — 94761 N-INVAS EAR/PLS OXIMETRY MLT: CPT

## 2024-08-18 RX ORDER — FUROSEMIDE 20 MG
20 TABLET ORAL DAILY
Status: DISCONTINUED | OUTPATIENT
Start: 2024-08-19 | End: 2024-08-19 | Stop reason: HOSPADM

## 2024-08-18 RX ADMIN — TIOTROPIUM BROMIDE INHALATION SPRAY 2 PUFF: 3.12 SPRAY, METERED RESPIRATORY (INHALATION) at 07:35

## 2024-08-18 RX ADMIN — IPRATROPIUM BROMIDE AND ALBUTEROL SULFATE 3 ML: .5; 3 SOLUTION RESPIRATORY (INHALATION) at 11:41

## 2024-08-18 RX ADMIN — BUDESONIDE AND FORMOTEROL FUMARATE DIHYDRATE 2 PUFF: 160; 4.5 AEROSOL RESPIRATORY (INHALATION) at 07:36

## 2024-08-18 RX ADMIN — BUSPIRONE HYDROCHLORIDE 7.5 MG: 15 TABLET ORAL at 09:03

## 2024-08-18 RX ADMIN — HYDROXYZINE PAMOATE 25 MG: 25 CAPSULE ORAL at 21:38

## 2024-08-18 RX ADMIN — PANTOPRAZOLE SODIUM 40 MG: 40 TABLET, DELAYED RELEASE ORAL at 09:03

## 2024-08-18 RX ADMIN — BUDESONIDE AND FORMOTEROL FUMARATE DIHYDRATE 2 PUFF: 160; 4.5 AEROSOL RESPIRATORY (INHALATION) at 19:10

## 2024-08-18 RX ADMIN — FOLIC ACID 1 MG: 1 TABLET ORAL at 09:03

## 2024-08-18 RX ADMIN — RIVAROXABAN 15 MG: 15 TABLET, FILM COATED ORAL at 17:26

## 2024-08-18 RX ADMIN — Medication 10 ML: at 20:35

## 2024-08-18 RX ADMIN — FUROSEMIDE 40 MG: 40 TABLET ORAL at 09:03

## 2024-08-18 RX ADMIN — IPRATROPIUM BROMIDE AND ALBUTEROL SULFATE 3 ML: .5; 3 SOLUTION RESPIRATORY (INHALATION) at 15:33

## 2024-08-18 RX ADMIN — AMITRIPTYLINE HYDROCHLORIDE 25 MG: 25 TABLET, FILM COATED ORAL at 20:35

## 2024-08-18 RX ADMIN — BUSPIRONE HYDROCHLORIDE 7.5 MG: 15 TABLET ORAL at 17:26

## 2024-08-18 RX ADMIN — BUSPIRONE HYDROCHLORIDE 7.5 MG: 15 TABLET ORAL at 21:38

## 2024-08-18 RX ADMIN — METOPROLOL SUCCINATE 12.5 MG: 25 TABLET, EXTENDED RELEASE ORAL at 09:03

## 2024-08-18 RX ADMIN — IPRATROPIUM BROMIDE AND ALBUTEROL SULFATE 3 ML: .5; 3 SOLUTION RESPIRATORY (INHALATION) at 19:10

## 2024-08-18 RX ADMIN — PANTOPRAZOLE SODIUM 40 MG: 40 TABLET, DELAYED RELEASE ORAL at 17:26

## 2024-08-18 RX ADMIN — SERTRALINE 100 MG: 100 TABLET, FILM COATED ORAL at 21:38

## 2024-08-18 RX ADMIN — Medication 100 MG: at 09:03

## 2024-08-18 RX ADMIN — Medication 10 ML: at 09:03

## 2024-08-18 RX ADMIN — Medication 1 TABLET: at 09:03

## 2024-08-18 RX ADMIN — HYDROXYZINE PAMOATE 25 MG: 25 CAPSULE ORAL at 01:52

## 2024-08-18 RX ADMIN — IPRATROPIUM BROMIDE AND ALBUTEROL SULFATE 3 ML: .5; 3 SOLUTION RESPIRATORY (INHALATION) at 07:32

## 2024-08-18 NOTE — PLAN OF CARE
Problem: Adult Inpatient Plan of Care  Goal: Plan of Care Review  Outcome: Ongoing, Progressing  Flowsheets (Taken 8/18/2024 0702)  Plan of Care Reviewed With: patient  Outcome Evaluation: A/O, vss, stated he wants to attend AA , stop drinking and start getting closer to God. I told him those are great goals, now you just have to stay focus and do it. Prn medication helped with his anxiety and helped him sleep this shift. WCTM.     Problem: Adult Inpatient Plan of Care  Goal: Patient-Specific Goal (Individualized)  Outcome: Ongoing, Progressing     Problem: Adult Inpatient Plan of Care  Goal: Absence of Hospital-Acquired Illness or Injury  Intervention: Identify and Manage Fall Risk  Recent Flowsheet Documentation  Taken 8/18/2024 0200 by Louann Ochoa RN  Safety Promotion/Fall Prevention:   safety round/check completed   room organization consistent  Taken 8/18/2024 0000 by Louann Ochoa RN  Safety Promotion/Fall Prevention:   safety round/check completed   room organization consistent  Taken 8/17/2024 2210 by Louann Ochoa RN  Safety Promotion/Fall Prevention:   safety round/check completed   room organization consistent  Taken 8/17/2024 2015 by Louann Ochoa RN  Safety Promotion/Fall Prevention:   safety round/check completed   room organization consistent   lighting adjusted   fall prevention program maintained   clutter free environment maintained     Problem: Adult Inpatient Plan of Care  Goal: Absence of Hospital-Acquired Illness or Injury  Intervention: Prevent Skin Injury  Recent Flowsheet Documentation  Taken 8/18/2024 0200 by Louann Ochoa RN  Body Position: sitting up in bed  Taken 8/18/2024 0000 by Louann Ochoa RN  Body Position:   side-lying   head facing, right  Taken 8/17/2024 2210 by Louann Ochoa, RN  Body Position:   position changed independently   supine, legs elevated  Taken 8/17/2024 2015 by Louann Ochoa, RN  Body Position:   position changed independently   weight shifting  Skin  Protection: adhesive use limited     Problem: Adult Inpatient Plan of Care  Goal: Absence of Hospital-Acquired Illness or Injury  Intervention: Prevent Infection  Recent Flowsheet Documentation  Taken 8/18/2024 0200 by Louann Ochoa RN  Infection Prevention:   hand hygiene promoted   personal protective equipment utilized  Taken 8/18/2024 0000 by Louann Ochoa RN  Infection Prevention:   personal protective equipment utilized   hand hygiene promoted   rest/sleep promoted  Taken 8/17/2024 2210 by Louann Ochoa RN  Infection Prevention:   personal protective equipment utilized   rest/sleep promoted  Taken 8/17/2024 2015 by Louann Ochoa RN  Infection Prevention: personal protective equipment utilized   Goal Outcome Evaluation:  Plan of Care Reviewed With: patient           Outcome Evaluation: A/O, vss, stated he wants to attend AA , stop drinking and start getting closer to God. I told him those are great goals, now you just have to stay focus and do it. Prn medication helped with his anxiety and helped him sleep this shift. WCTM.

## 2024-08-18 NOTE — PROGRESS NOTES
[facility] Progress Note      Patient Name: Javi Doran  : 1960  MRN: [id number]  Primary Care Physician:  Ginette Bryant MD  Date of admission: 8/10/2024    Subjective     Interval History:   Follow-up acute kidney injury.  Shortness of breath is improving.  Still some KNIGHT.    Review of Systems:   As noted above    Objective     Vitals:   Temp:  [97.3 °F (36.3 °C)-97.9 °F (36.6 °C)] 97.8 °F (36.6 °C)  Heart Rate:  [60-66] 61  Resp:  [18] 18  BP: (110-119)/(71-85) 119/76  Flow (L/min):  [2] 2    Intake/Output Summary (Last 24 hours) at 2024 1136  Last data filed at 2024 0732  Gross per 24 hour   Intake 222 ml   Output 1200 ml   Net -978 ml       Physical Exam:    General: Awake, slightly restless.  Skin: warm and dry  HEENT: oral mucosa normal, nonicteric sclera  Neck: supple, no JVD  Lungs: A few crackles, unlabored on 2 L/min.  Heart: RRR, normal S1 and S2  Abdomen: soft, nontender, nondistended. +bs  : no palpable bladder  Extremities: Trace lower extremity edema  Neuro: Awake, alert.  Answers appropriately.    Scheduled Meds:     amitriptyline, 25 mg, Oral, Nightly  budesonide-formoterol, 2 puff, Inhalation, BID - RT  busPIRone, 7.5 mg, Oral, TID  folic acid, 1 mg, Oral, Daily  furosemide, 40 mg, Oral, Daily  ipratropium-albuterol, 3 mL, Nebulization, 4x Daily - RT  metoprolol succinate XL, 12.5 mg, Oral, Q24H  multivitamin with minerals, 1 tablet, Oral, Daily  pantoprazole, 40 mg, Oral, BID AC  rivaroxaban, 15 mg, Oral, Daily With Dinner  sertraline, 100 mg, Oral, Nightly  sodium chloride, 10 mL, Intravenous, Q12H  thiamine, 100 mg, Oral, Daily  tiotropium bromide monohydrate, 2 puff, Inhalation, Daily - RT      IV Meds:        Results Reviewed:   I have personally reviewed the results from the time of this admission to 2024 11:36 EDT     Results from last 7 days   Lab Units 24  0613 24  0424 24  0554   SODIUM  mmol/L 140 137 137   POTASSIUM mmol/L 4.0 4.3 3.6   CHLORIDE mmol/L 105 104 101   CO2 mmol/L 26.0 23.6 24.8   BUN mg/dL 29* 28* 32*   CREATININE mg/dL 1.64* 1.53* 1.85*   CALCIUM mg/dL 9.1 9.0 9.0   BILIRUBIN mg/dL 2.2* 2.1* 2.4*   ALK PHOS U/L 304* 279* 287*   ALT (SGPT) U/L 121* 123* 112*   AST (SGOT) U/L 132* 166* 182*   GLUCOSE mg/dL 80 96 89       Estimated Creatinine Clearance: 50.1 mL/min (A) (by C-G formula based on SCr of 1.64 mg/dL (H)).    Results from last 7 days   Lab Units 08/16/24  0554 08/15/24  0408 08/14/24  0615   MAGNESIUM mg/dL 2.3 2.2 2.3   PHOSPHORUS mg/dL 3.0 2.5 2.8             Results from last 7 days   Lab Units 08/18/24  0613 08/17/24  0424 08/16/24  0554 08/14/24  0615 08/13/24  0443   WBC 10*3/mm3 5.60 6.19 6.63 8.86 7.80   HEMOGLOBIN g/dL 10.1* 9.6* 9.6* 10.3* 9.5*   PLATELETS 10*3/mm3 152 148 162 237 257       Results from last 7 days   Lab Units 08/16/24  0938   INR  3.00*       Assessment / Plan     ASSESSMENT:  EDMOND, nonoliguric, improving and multifactorial: hypotension and decompensated systolic heart failure, along with impaired renal autoregulation due to ARB.  Improving volume excess.  Creatinine slightly up today at 1.53->1.64 today.  Reduce lasix to 20mg/day.  Alcohol abuse and withdrawal.  On CIWA protocol.  Alcoholic hepatitis.  Transaminases worse.    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  Acute on chronic ischemic systolic cardiomyopathy.  Prior stents.  Echo at [facility] 7/3/2024 with EF 30 to 35%, akinetic anteroseptal apical and anteroapical walls.  Prior history of hypertrophic cardiomyopathy with myomectomy and maze procedure as well as mitral valve replacement.  7.  Anemia low iron    Jaylon Barnes MD  08/18/24  11:36 EDT    [facility]  [phone]

## 2024-08-18 NOTE — PROGRESS NOTES
Name: Javi Doran ADMIT: 8/10/2024   : 1960  PCP: Ginette Bryant MD    MRN: [id number] LOS: 7 days   AGE/SEX: 64 y.o. male  ROOM: [facility]     Subjective   Subjective   Patient is lying on the bed and is chronically ill-appearing and continues to complain of dyspnea although does not appear to be any respiratory distress and is currently on 1 L of oxygen.     Objective   Objective   Vital Signs  Temp:  [97.3 °F (36.3 °C)-97.9 °F (36.6 °C)] 97.8 °F (36.6 °C)  Heart Rate:  [61-73] 61  Resp:  [18] 18  BP: (109-119)/(71-76) 109/75  SpO2:  [96 %-100 %] 100 %  on  Flow (L/min):  [1-2] 1;   Device (Oxygen Therapy): nasal cannula  Body mass index is 22.96 kg/m².  Physical Exam  Vitals reviewed.   Constitutional:       General: He is not in acute distress.     Appearance: He is well-developed. He is ill-appearing.      Comments: Drowsy   HENT:      Head: Normocephalic and atraumatic.   Eyes:      General: Scleral icterus present.   Cardiovascular:      Rate and Rhythm: Normal rate and regular rhythm.      Heart sounds: No murmur heard.  Pulmonary:      Effort: Pulmonary effort is normal. No respiratory distress.      Breath sounds: Normal breath sounds. No wheezing.   Abdominal:      General: There is distension.      Palpations: Abdomen is soft.      Tenderness: There is no abdominal tenderness.   Musculoskeletal:      Right lower leg: No edema.      Left lower leg: No edema.   Skin:     General: Skin is warm and dry.      Coloration: Skin is pale.      Findings: No rash.   Neurological:      Comments: No real tremor or asterixis       Results Review     I reviewed the patient's new clinical results.  Results from last 7 days   Lab Units 24  0613 24  0424 24  0554 24  0615   WBC 10*3/mm3 5.60 6.19 6.63 8.86   HEMOGLOBIN g/dL 10.1* 9.6* 9.6* 10.3*   PLATELETS 10*3/mm3 152 148 162 237     Results from last 7 days   Lab Units 24  0613 24  0424  "08/16/24  0554 08/15/24  1552 08/15/24  0408   SODIUM mmol/L 140 137 137  --  137   POTASSIUM mmol/L 4.0 4.3 3.6 3.5 3.1*   CHLORIDE mmol/L 105 104 101  --  98   CO2 mmol/L 26.0 23.6 24.8  --  26.0   BUN mg/dL 29* 28* 32*  --  35*   CREATININE mg/dL 1.64* 1.53* 1.85*  --  2.02*   GLUCOSE mg/dL 80 96 89  --  88   EGFR mL/min/1.73 46.7* 50.8* 40.4*  --  36.4*     Results from last 7 days   Lab Units 08/18/24  0613 08/17/24  0424 08/16/24  0554 08/15/24  0408   ALBUMIN g/dL 3.6 3.3* 3.4* 3.7   BILIRUBIN mg/dL 2.2* 2.1* 2.4* 2.5*   ALK PHOS U/L 304* 279* 287* 315*   AST (SGOT) U/L 132* 166* 182* 145*   ALT (SGPT) U/L 121* 123* 112* 93*     Results from last 7 days   Lab Units 08/18/24  0613 08/17/24  0424 08/16/24  0554 08/15/24  0408 08/14/24  0615 08/13/24  0443   CALCIUM mg/dL 9.1 9.0 9.0 8.8 8.7 8.2*   ALBUMIN g/dL 3.6 3.3* 3.4* 3.7 3.5 3.5   MAGNESIUM mg/dL  --   --  2.3 2.2 2.3 2.4   PHOSPHORUS mg/dL  --   --  3.0 2.5 2.8 2.5       No results found for: \"HGBA1C\", \"POCGLU\"    CT Chest Without Contrast Diagnostic    Result Date: 8/17/2024  1.  No pneumothorax is seen. A linear area of hyperlucency within the periphery of the lingula likely corresponds with the linear area seen on recent chest radiograph. 2.  Small to moderate right and small left pleural effusions with interval increase on the right. The degree of patchy groundglass and pulmonary opacification scattered throughout the bilateral lungs has also mildly increased. 3.  Mediastinal adenopathy, as before. Attention on follow-up with chest CT in 8 weeks is recommended to ensure stability and for resolution and exclude any possibility of neoplasm. 4.  Other findings above.    This report was finalized on 8/17/2024 5:45 PM by Dr. Jeremy Reynolds M.D on Workstation: [id number]      XR Chest PA & Lateral    Result Date: 8/17/2024  FINDINGS AND IMPRESSION: Pleural effusion is present posteriorly.  There are median sternotomy wires. Multiple left-sided rib fractures " are present, not well evaluated and best seen on recent CT chest 8/15/2024. There is concern for a small left pneumothorax at the lung bases, not seen on 8/13/2024. Further evaluation with chest CT is recommended to better characterize these findings.  Bibasilar pulmonary opacification, left greater than right, has worsened since 8/13/2024. Cardiac silhouette is mild to moderately in size. Gas bubble underlying the left hemidiaphragm appears to be contiguous with the adjacent stomach and is favored to be related to eventration. However, attention above-mentioned CT chest is recommended to exclude the small possibility of free peritoneal air although this is considered less likely.  Above findings were discussed with Jose, the patient's nurse.        This report was finalized on 8/17/2024 4:15 PM by Dr. Jeremy Reynolds M.D on Workstation: [id number]       I have personally reviewed all medications:  Scheduled Medications  amitriptyline, 25 mg, Oral, Nightly  budesonide-formoterol, 2 puff, Inhalation, BID - RT  busPIRone, 7.5 mg, Oral, TID  folic acid, 1 mg, Oral, Daily  [START ON 8/19/2024] furosemide, 20 mg, Oral, Daily  ipratropium-albuterol, 3 mL, Nebulization, 4x Daily - RT  metoprolol succinate XL, 12.5 mg, Oral, Q24H  multivitamin with minerals, 1 tablet, Oral, Daily  pantoprazole, 40 mg, Oral, BID AC  rivaroxaban, 15 mg, Oral, Daily With Dinner  sertraline, 100 mg, Oral, Nightly  sodium chloride, 10 mL, Intravenous, Q12H  thiamine, 100 mg, Oral, Daily  tiotropium bromide monohydrate, 2 puff, Inhalation, Daily - RT    Infusions   Diet  Diet: Regular/House, Cardiac; Healthy Heart (2-3 Na+); Fluid Consistency: Thin (IDDSI 0)    I have personally reviewed:  [x]  Laboratory   []  Microbiology   [x]  Radiology   []  EKG/Telemetry  [x]  Cardiology/Vascular   []  Pathology    []  Records       Assessment/Plan     Active Hospital Problems    Diagnosis  POA    EDMOND (acute kidney injury) [N17.9]  Yes    Alcohol withdrawal  syndrome without complication [F10.930]  Yes    Essential hypertension [I10]  Yes    Anemia [D64.9]  Yes    Acute on chronic systolic congestive heart failure [I50.23]  Yes    Anxiety [F41.9]  Yes      Resolved Hospital Problems    Diagnosis Date Resolved POA    **Hypokalemia [E87.6] 08/14/2024 Yes       64 y.o. male with history of alcohol abuse and NICM admitted with dyspnea, EDMOND and Hypokalemia.    Dyspnea: CT chest is suggestive of only small pleural effusion with loculated pleural fluid seen in the left major fissure.  There is evidence of emphysema and small multifocal opacities were also noted.  No evidence of any infiltrate as such or a PE.  On Brovana and Spiriva which will be continued..  Currently on 2 L of oxygen which will be continued.  2D echo during his hospital stay revealed ejection fraction of 54 percent.  Pulmonary plans on thoracentesis if unable to be diuresed and diuretics have been initiated on 8/17.    PAF status post ablation: Given mild QT prolongation amiodarone was discontinued by cardiology and is currently on Xarelto and Toprol low-dose.    EDMOND stable: Creatinine peaked at 2.02 and started improving and noted to be at 1.64 today nephrology is following along.  Baseline creatinine is around 1.1 and avoid nephrotoxins.    ETOH abuse/withdrawal-continue thiamine/folate/MVI.  Currently not going through any withdrawal and is on a CIWA protocol.    Alcoholic hepatitis: LFTs continue to take upward slightly.  Continue to monitor daily  - Checked ammonia which is normal    Anemia- No overt bleeding though obviously some risk given ETOH use.  H&H stable  - Continue PPI and monitor daily  - Hold AC if any overt bleeding    Severe anxiety  -Consulted psychiatry    SCD/Xarelto  Dispo: LAILA Wells MD  [facility]  08/18/24  17:21 EDT

## 2024-08-18 NOTE — PROGRESS NOTES
[facility]         [address]    2024      PATIENT IDENTIFICATION:  Name: Javi Doran ADMIT: 8/10/2024   : 1960  PCP: Ginette Bryant MD    MRN: [id number] LOS: 7 days   AGE/SEX: 64 y.o. male  ROOM: [facility]                     LOS 7    Reason for visit: Shortness of breath with pulmonary filtrates and pleural effusions      SUBJECTIVE:      Breathing better than yesterday but still moderately weak.  On room air at time of visit.  Diminished breath sounds but no wheezing or rhonchi on auscultation.      Objective   OBJECTIVE:    Vital Sign Min/Max for last 24 hours  Temp  Min: 97.3 °F (36.3 °C)  Max: 97.9 °F (36.6 °C)   BP  Min: 110/71  Max: 119/76   Pulse  Min: 60  Max: 66   Resp  Min: 18  Max: 18   SpO2  Min: 96 %  Max: 100 %   No data recorded   Weight  Min: 76.8 kg (169 lb 5 oz)  Max: 76.8 kg (169 lb 5 oz)    Vitals:    24 2313 24 0309 24 0723 24 0732   BP: 119/76      BP Location: Right arm      Patient Position: Lying      Pulse: 61   61   Resp: 18  18 18   Temp: 97.9 °F (36.6 °C)  97.8 °F (36.6 °C)    TempSrc: Oral  Oral    SpO2: 100%   96%   Weight:  76.8 kg (169 lb 5 oz)     Height:                08/15/24  1536 24  0600 24  0309   Weight: 74.8 kg (165 lb) 76.9 kg (169 lb 8.5 oz) 76.8 kg (169 lb 5 oz)       Body mass index is 22.96 kg/m².                          Body mass index is 22.96 kg/m².    Intake/Output Summary (Last 24 hours) at 2024 1024  Last data filed at 2024 0732  Gross per 24 hour   Intake 222 ml   Output 1200 ml   Net -978 ml         Exam:  GEN:  No distress, appears stated age  EYES:   PERRL, anicteric sclerae  ENT:    External ears/nose normal, OP clear  NECK:  No adenopathy, midline trachea  LUNGS: Normal chest on inspection, palpation and diminished breath sounds at bases on auscultation  CV:  Normal S1S2, without murmur  ABD:  Nontender, nondistended, no  hepatosplenomegaly, +BS  EXT:  No edema.  No cyanosis or clubbing.  No mottling and normal cap refill.    Assessment     Scheduled meds:  amitriptyline, 25 mg, Oral, Nightly  budesonide-formoterol, 2 puff, Inhalation, BID - RT  busPIRone, 7.5 mg, Oral, TID  folic acid, 1 mg, Oral, Daily  furosemide, 40 mg, Oral, Daily  ipratropium-albuterol, 3 mL, Nebulization, 4x Daily - RT  metoprolol succinate XL, 12.5 mg, Oral, Q24H  multivitamin with minerals, 1 tablet, Oral, Daily  pantoprazole, 40 mg, Oral, BID AC  rivaroxaban, 15 mg, Oral, Daily With Dinner  sertraline, 100 mg, Oral, Nightly  sodium chloride, 10 mL, Intravenous, Q12H  thiamine, 100 mg, Oral, Daily  tiotropium bromide monohydrate, 2 puff, Inhalation, Daily - RT      IV meds:                         Data Review:  Results from last 7 days   Lab Units 08/18/24  0613 08/17/24  0424 08/16/24  0554 08/15/24  1552 08/15/24  0408 08/14/24  0615   SODIUM mmol/L 140 137 137  --  137 137   POTASSIUM mmol/L 4.0 4.3 3.6 3.5 3.1* 4.0   CHLORIDE mmol/L 105 104 101  --  98 99   CO2 mmol/L 26.0 23.6 24.8  --  26.0 22.7   BUN mg/dL 29* 28* 32*  --  35* 34*   CREATININE mg/dL 1.64* 1.53* 1.85*  --  2.02* 1.95*   GLUCOSE mg/dL 80 96 89  --  88 94   CALCIUM mg/dL 9.1 9.0 9.0  --  8.8 8.7         Estimated Creatinine Clearance: 50.1 mL/min (A) (by C-G formula based on SCr of 1.64 mg/dL (H)).  Results from last 7 days   Lab Units 08/18/24  0613 08/17/24  0424 08/16/24  0554 08/14/24  0615 08/13/24  0443   WBC 10*3/mm3 5.60 6.19 6.63 8.86 7.80   HEMOGLOBIN g/dL 10.1* 9.6* 9.6* 10.3* 9.5*   PLATELETS 10*3/mm3 152 148 162 237 257     Results from last 7 days   Lab Units 08/16/24  0938   INR  3.00*     Results from last 7 days   Lab Units 08/18/24  0613 08/17/24  0424 08/16/24  0554 08/15/24  0408 08/14/24  0615   ALT (SGPT) U/L 121* 123* 112* 93* 94*   AST (SGOT) U/L 132* 166* 182* 145* 170*     Results from last 7 days   Lab Units 08/15/24  1719   PH, ARTERIAL pH units 7.439   PO2  "ART mm Hg 59.9*   PCO2, ARTERIAL mm Hg 38.7   HCO3 ART mmol/L 26.2             No results found for: \"HGBA1C\", \"POCGLU\"      Imaging reviewed  2D echo reviewed: EF 55%    CT chest 8/17 reviewed moderate right and small left pleural effusions.  Patchy groundglass opacities mildly increased.  Mediastinal adenopathy noted.      Chest x-ray 8/17 reviewed left greater than right airspace disease worse compared to 8/13.  Moderate enlarged cardiac silhouette.    Microbiology reviewed  RVP negative          Active Hospital Problems    Diagnosis  POA    EDMOND (acute kidney injury) [N17.9]  Yes    Alcohol withdrawal syndrome without complication [F10.930]  Yes    Essential hypertension [I10]  Yes    Anemia [D64.9]  Yes    Acute on chronic systolic congestive heart failure [I50.23]  Yes    Anxiety [F41.9]  Yes      Resolved Hospital Problems    Diagnosis Date Resolved POA    **Hypokalemia [E87.6] 08/14/2024 Yes         ASSESSMENT:  AMS  Dyspnea  Bilateral pleural effusions  Patchy bilateral pulmonary infiltrates  Bibasilar infiltrates, likely atelectasis  COPD, requires PFT's  Current cig smoker  EDMOND  Acute on chronic HFrEF, EF 34%: Repeat 55%  Afib  Hx MV repair  CAD with hx PCI  Alcoholism  Anemia      PLAN:  Changed nebulized medicines to Symbicort and Spiriva.  Repeat chest x-ray given patient's continued complaints of shortness of breath with any activity.  On diuretics per cardiology and admitting service.  Encourage pulmonary toilet.  Repeat CT in 8 weeks for follow-up of mediastinal adenopathy.  May simply be reactive.  Unable to rule out potential neoplastic process.  Will have him follow-up with us in the office after discharge.  Will schedule with Dr. Hernandez.    Hal Mckeon MD  Pulmonary and Critical Care Medicine  [facility]  8/18/2024    10:24 EDT     "

## 2024-08-18 NOTE — PROGRESS NOTES
LOS: 7 days   Patient Care Team:  Ginette Bryant MD as PCP - General (Family Medicine)    Chief Complaint: follow up alcohol abuse and withdrawal, acute on systolic CHF, HOCM, LBBB    Interval History: He is resting in bed on my exam. He denies chest pain or discomfort, palpitations, or shortness of breath.     Vital Signs:  Temp:  [97.3 °F (36.3 °C)-97.9 °F (36.6 °C)] 97.8 °F (36.6 °C)  Heart Rate:  [61-73] 73  Resp:  [18] 18  BP: (109-119)/(71-76) 109/75    Intake/Output Summary (Last 24 hours) at 8/18/2024 1618  Last data filed at 8/18/2024 1355  Gross per 24 hour   Intake --   Output 700 ml   Net -700 ml        Physical Exam  Vitals reviewed.   Constitutional:       General: He is not in acute distress.  HENT:      Head: Normocephalic.      Nose: Nose normal.   Eyes:      Extraocular Movements: Extraocular movements intact.      Pupils: Pupils are equal, round, and reactive to light.   Cardiovascular:      Rate and Rhythm: Normal rate and regular rhythm.      Pulses: Normal pulses.      Heart sounds: Normal heart sounds. Heart sounds not distant. No murmur heard.     No friction rub. No gallop. No S3 or S4 sounds.   Pulmonary:      Effort: Pulmonary effort is normal.      Breath sounds: Normal breath sounds.   Abdominal:      General: Abdomen is flat. Bowel sounds are normal.      Palpations: Abdomen is soft.      Tenderness: There is no abdominal tenderness.   Skin:     General: Skin is warm and dry.   Neurological:      General: No focal deficit present.      Mental Status: He is alert and oriented to person, place, and time. Mental status is at baseline.   Psychiatric:         Mood and Affect: Mood normal.         Behavior: Behavior normal.         Results Review:    Results from last 7 days   Lab Units 08/18/24  0613   SODIUM mmol/L 140   POTASSIUM mmol/L 4.0   CHLORIDE mmol/L 105   CO2 mmol/L 26.0   BUN mg/dL 29*   CREATININE mg/dL 1.64*   GLUCOSE mg/dL 80   CALCIUM mg/dL 9.1            Results from last 7 days   Lab Units 08/18/24  0613   WBC 10*3/mm3 5.60   HEMOGLOBIN g/dL 10.1*   HEMATOCRIT % 32.4*   PLATELETS 10*3/mm3 152     Results from last 7 days   Lab Units 08/16/24  0938   INR  3.00*         Results from last 7 days   Lab Units 08/16/24  0554   MAGNESIUM mg/dL 2.3           I reviewed the patient's new clinical results.        Assessment & Plan:  Acute on chronic systolic CHF   EF in July 2024 was 34%, on echocardiogram on 8/15/2024 EF 54%  Guideline directed medical therapy is limited by hypotension and renal insufficiency.  He is on minimal metoprolol succinate  Nephrology is managing diuretics, appreciate their expertise  History of hypertrophic cardiomyopathy status post myectomy  Paroxysmal atrial fibrillation  Status post MAZE procedure at time of myectomy  On rivaroxaban  Mitral valve regurgitation status post mitral valve repair  Coronary artery disease with multiple prior percutaneous interventions  LBBB, first-degree AV block  Prolonged QT interval  Amiodarone stopped on 8/14/2024  Remains prolonged but is improving  Acute kidney injury  Waste products peaked at 2, 1.64 today (increase from yesterday).  Alcohol abuse with withdrawal  Emphysema by CT scan  Suspected pulmonary hypertension  Hypokalemia  Coagulopathy secondary to liver disease  Alcoholic hepatitis  Statin stopped  Multifactorial anemia  Pleural effusions, right greater than left  Diuretic therapy is limited by his renal dysfunction. May not be able to mobilize this much more, may need a thoracentesis. This is complicated by his elevated INR, was 3.0 on 8/16. Will repeat in the morning.     Ibis Hednrix, APRN  08/18/24  16:18 EDT

## 2024-08-19 ENCOUNTER — READMISSION MANAGEMENT (OUTPATIENT)
Dept: CALL CENTER | Facility: HOSPITAL | Age: 64
End: 2024-08-19
Payer: MEDICARE

## 2024-08-19 VITALS
HEIGHT: 72 IN | RESPIRATION RATE: 18 BRPM | BODY MASS INDEX: 22.93 KG/M2 | WEIGHT: 169.31 LBS | OXYGEN SATURATION: 91 % | SYSTOLIC BLOOD PRESSURE: 107 MMHG | DIASTOLIC BLOOD PRESSURE: 78 MMHG | HEART RATE: 60 BPM | TEMPERATURE: 97.5 F

## 2024-08-19 LAB
ALBUMIN SERPL-MCNC: 3.3 G/DL (ref 3.5–5.2)
ALBUMIN/GLOB SERPL: 1.2 G/DL
ALP SERPL-CCNC: 261 U/L (ref 39–117)
ALT SERPL W P-5'-P-CCNC: 101 U/L (ref 1–41)
ANION GAP SERPL CALCULATED.3IONS-SCNC: 10.7 MMOL/L (ref 5–15)
AST SERPL-CCNC: 89 U/L (ref 1–40)
BASOPHILS # BLD AUTO: 0.06 10*3/MM3 (ref 0–0.2)
BASOPHILS NFR BLD AUTO: 1 % (ref 0–1.5)
BILIRUB SERPL-MCNC: 1.7 MG/DL (ref 0–1.2)
BUN SERPL-MCNC: 28 MG/DL (ref 8–23)
BUN/CREAT SERPL: 17.6 (ref 7–25)
CALCIUM SPEC-SCNC: 8.9 MG/DL (ref 8.6–10.5)
CHLORIDE SERPL-SCNC: 103 MMOL/L (ref 98–107)
CO2 SERPL-SCNC: 23.3 MMOL/L (ref 22–29)
CREAT SERPL-MCNC: 1.59 MG/DL (ref 0.76–1.27)
DEPRECATED RDW RBC AUTO: 59.5 FL (ref 37–54)
EGFRCR SERPLBLD CKD-EPI 2021: 48.5 ML/MIN/1.73
EOSINOPHIL # BLD AUTO: 0.12 10*3/MM3 (ref 0–0.4)
EOSINOPHIL NFR BLD AUTO: 2.1 % (ref 0.3–6.2)
ERYTHROCYTE [DISTWIDTH] IN BLOOD BY AUTOMATED COUNT: 19.4 % (ref 12.3–15.4)
GLOBULIN UR ELPH-MCNC: 2.7 GM/DL
GLUCOSE SERPL-MCNC: 104 MG/DL (ref 65–99)
HCT VFR BLD AUTO: 30.1 % (ref 37.5–51)
HGB BLD-MCNC: 9.4 G/DL (ref 13–17.7)
IMM GRANULOCYTES # BLD AUTO: 0.03 10*3/MM3 (ref 0–0.05)
IMM GRANULOCYTES NFR BLD AUTO: 0.5 % (ref 0–0.5)
INR PPP: 2.39 (ref 0.9–1.1)
LYMPHOCYTES # BLD AUTO: 0.8 10*3/MM3 (ref 0.7–3.1)
LYMPHOCYTES NFR BLD AUTO: 13.9 % (ref 19.6–45.3)
MCH RBC QN AUTO: 26.8 PG (ref 26.6–33)
MCHC RBC AUTO-ENTMCNC: 31.2 G/DL (ref 31.5–35.7)
MCV RBC AUTO: 85.8 FL (ref 79–97)
MONOCYTES # BLD AUTO: 0.51 10*3/MM3 (ref 0.1–0.9)
MONOCYTES NFR BLD AUTO: 8.8 % (ref 5–12)
NEUTROPHILS NFR BLD AUTO: 4.25 10*3/MM3 (ref 1.7–7)
NEUTROPHILS NFR BLD AUTO: 73.7 % (ref 42.7–76)
NRBC BLD AUTO-RTO: 0 /100 WBC (ref 0–0.2)
PLATELET # BLD AUTO: 147 10*3/MM3 (ref 140–450)
PMV BLD AUTO: 9.8 FL (ref 6–12)
POTASSIUM SERPL-SCNC: 3.9 MMOL/L (ref 3.5–5.2)
PROT SERPL-MCNC: 6 G/DL (ref 6–8.5)
PROTHROMBIN TIME: 26.3 SECONDS (ref 11.7–14.2)
RBC # BLD AUTO: 3.51 10*6/MM3 (ref 4.14–5.8)
SODIUM SERPL-SCNC: 137 MMOL/L (ref 136–145)
WBC NRBC COR # BLD AUTO: 5.77 10*3/MM3 (ref 3.4–10.8)

## 2024-08-19 PROCEDURE — 94664 DEMO&/EVAL PT USE INHALER: CPT

## 2024-08-19 PROCEDURE — 99232 SBSQ HOSP IP/OBS MODERATE 35: CPT | Performed by: INTERNAL MEDICINE

## 2024-08-19 PROCEDURE — 93005 ELECTROCARDIOGRAM TRACING: CPT | Performed by: NURSE PRACTITIONER

## 2024-08-19 PROCEDURE — 94799 UNLISTED PULMONARY SVC/PX: CPT

## 2024-08-19 PROCEDURE — 85610 PROTHROMBIN TIME: CPT

## 2024-08-19 PROCEDURE — 85025 COMPLETE CBC W/AUTO DIFF WBC: CPT | Performed by: INTERNAL MEDICINE

## 2024-08-19 PROCEDURE — 80053 COMPREHEN METABOLIC PANEL: CPT | Performed by: HOSPITALIST

## 2024-08-19 RX ORDER — PANTOPRAZOLE SODIUM 40 MG/1
40 TABLET, DELAYED RELEASE ORAL
Qty: 60 TABLET | Refills: 0 | Status: SHIPPED | OUTPATIENT
Start: 2024-08-19 | End: 2024-09-18

## 2024-08-19 RX ORDER — HYDROXYZINE PAMOATE 25 MG/1
25 CAPSULE ORAL 3 TIMES DAILY PRN
Qty: 90 CAPSULE | Refills: 0 | Status: SHIPPED | OUTPATIENT
Start: 2024-08-19 | End: 2024-09-18

## 2024-08-19 RX ORDER — METOPROLOL SUCCINATE 25 MG/1
12.5 TABLET, EXTENDED RELEASE ORAL
Qty: 15 TABLET | Refills: 0 | Status: SHIPPED | OUTPATIENT
Start: 2024-08-20 | End: 2024-09-19

## 2024-08-19 RX ADMIN — Medication 1 TABLET: at 09:55

## 2024-08-19 RX ADMIN — LORAZEPAM 0.25 MG: 0.5 TABLET ORAL at 12:31

## 2024-08-19 RX ADMIN — ACETAMINOPHEN 325MG 650 MG: 325 TABLET ORAL at 09:55

## 2024-08-19 RX ADMIN — IPRATROPIUM BROMIDE AND ALBUTEROL SULFATE 3 ML: .5; 3 SOLUTION RESPIRATORY (INHALATION) at 07:36

## 2024-08-19 RX ADMIN — TIOTROPIUM BROMIDE INHALATION SPRAY 2 PUFF: 3.12 SPRAY, METERED RESPIRATORY (INHALATION) at 07:44

## 2024-08-19 RX ADMIN — FOLIC ACID 1 MG: 1 TABLET ORAL at 09:57

## 2024-08-19 RX ADMIN — BUDESONIDE AND FORMOTEROL FUMARATE DIHYDRATE 2 PUFF: 160; 4.5 AEROSOL RESPIRATORY (INHALATION) at 07:40

## 2024-08-19 RX ADMIN — BUSPIRONE HYDROCHLORIDE 7.5 MG: 15 TABLET ORAL at 09:56

## 2024-08-19 RX ADMIN — METOPROLOL SUCCINATE 12.5 MG: 25 TABLET, EXTENDED RELEASE ORAL at 09:56

## 2024-08-19 RX ADMIN — Medication 10 ML: at 09:59

## 2024-08-19 RX ADMIN — IPRATROPIUM BROMIDE AND ALBUTEROL SULFATE 3 ML: .5; 3 SOLUTION RESPIRATORY (INHALATION) at 11:14

## 2024-08-19 RX ADMIN — PANTOPRAZOLE SODIUM 40 MG: 40 TABLET, DELAYED RELEASE ORAL at 09:56

## 2024-08-19 RX ADMIN — FUROSEMIDE 20 MG: 20 TABLET ORAL at 09:56

## 2024-08-19 RX ADMIN — Medication 100 MG: at 09:56

## 2024-08-19 NOTE — DISCHARGE SUMMARY
"    Patient Name: Javi Doran  : 1960  MRN: [id number]    Date of Admission: 8/10/2024  Date of Discharge:  2024  Primary Care Physician: Ginette Bryant MD      Chief Complaint:   Leg Swelling and Edema      Discharge Diagnoses     Active Hospital Problems    Diagnosis  POA    EDMOND (acute kidney injury) [N17.9]  Yes    Alcohol withdrawal syndrome without complication [F10.930]  Yes    Essential hypertension [I10]  Yes    Anemia [D64.9]  Yes    Acute on chronic systolic congestive heart failure [I50.23]  Yes    Anxiety [F41.9]  Yes      Resolved Hospital Problems    Diagnosis Date Resolved POA    **Hypokalemia [E87.6] 2024 Yes        Hospital Course   64-year-old male with longstanding history of alcohol abuse, tobacco abuse, CHF... With multiple admissions related to the above conditions.  Usually he is seen at [facility] and apparently they are very familiar with him.  He was just discharged from [facility] where he was treated for alcoholic ketoacidosis.  He presents to this facility complaining of \"my feet are swelling again.\".  This has been associated with orthopnea as well as dyspnea on exertion.  Medical compliance appears doubtful.  The medication list that he presented with has multiple inconsistencies especially when compared to the discharge medication list from his recent discharge at [facility].  No complaints of chest pain, palpitations, lightheadedness.  His primary complaint now is that he is \"nervous\" and want something for his nerves.  There is also history of benzodiazepine abuse.  He states his last drink of alcohol was \"a few days ago\".  He is still smoking but states \"I cut back\".  Prior medical history also includes paroxysmal atrial fibrillation, hypertrophic cardiomyopathy with a history of myomectomy with maze and mitral valve repair.  He has undergone an ablative procedure for his atrial fibrillation.  He also has coronary artery disease and has " "previously undergone angioplasty with coronary artery stenting.  Records from [facility] state \"multiple PCI\".  An echocardiogram done at [place] in June reports an LVEF of 36%.      Dyspnea: CT chest is suggestive of only small pleural effusion with loculated pleural fluid seen in the left major fissure.  There is evidence of emphysema and small multifocal opacities were also noted.  No evidence of any infiltrate as such or a PE.  On Brovana and Spiriva which will be continued..  Currently on 2 L of oxygen which will be continued.  2D echo during his hospital stay revealed ejection fraction of 54 percent.  Diuretics have been initiated and currently not requiring any oxygen.  Some component of dyspnea is felt to be secondary to anxiety and does not appear to be in any respiratory distress and is ambulatory without any distress.     PAF status post ablation: Given mild QT prolongation amiodarone was discontinued by cardiology and is currently on Xarelto and Toprol low-dose.     EDMOND stable: Creatinine peaked at 2.02 and started improving and noted to be at 1.59 today nephrology is following along.  Baseline creatinine is around 1.1 and avoid nephrotoxins.     ETOH abuse/withdrawal-continue with folate and thiamine supplements and did not go through any withdrawal and was on CIWA protocol.     Alcoholic hepatitis: LFTs continue to take upward slightly.  Continue to monitor daily  - Checked ammonia which is normal  -Counseled to quit drinking alcohol extensively and will attend AA meetings.     Anemia- No overt bleeding though obviously some risk given ETOH use.  H&H stable  - Continue PPI and monitor daily     Severe anxiety  -Consulted psychiatry  -Recommended to continue home dose BuSpar and will be on hydroxyzine as needed    Copied text on this note has been reviewed by me on 8/19/2024      Day of Discharge         Physical Exam:  Temp:  [97.3 °F (36.3 °C)-97.7 °F (36.5 °C)] 97.5 °F (36.4 °C)  Heart Rate:  " [57-61] 60  Resp:  [16-18] 18  BP: ()/(68-81) 107/78  Body mass index is 22.96 kg/m².  Physical Exam  Constitutional:       General: He is not in acute distress.     Appearance: He is well-developed. He is chronically ill-appearing.      Comments: Drowsy   HENT:      Head: Normocephalic and atraumatic.   Eyes:      General: Scleral icterus present.   Cardiovascular:      Rate and Rhythm: Normal rate and regular rhythm.      Heart sounds: No murmur heard.  Pulmonary:      Effort: Pulmonary effort is normal. No respiratory distress.      Breath sounds: Normal breath sounds. No wheezing.   Abdominal:      General: There is minimal distension.      Palpations: Abdomen is soft.      Tenderness: There is no abdominal tenderness.   Musculoskeletal:      Right lower leg: No edema.      Left lower leg: No edema.   Skin:     General: Skin is warm and dry.      Coloration: Skin is pale.      Findings: No rash.   Neurological:      Comments: No real tremor or asterixis     Consultants     Consult Orders (all) (From admission, onward)       Start     Ordered    08/16/24 1208  Inpatient Psychiatrist Consult  Once        Specialty:  Psychiatry  Provider:  Madi Helms III, MD    08/16/24 1207    08/16/24 1045  Inpatient Pulmonology Consult  Once,   Status:  Canceled        Specialty:  Pulmonary Disease  Provider:  Adryan Vicente MD    08/16/24 1044    08/15/24 1556  Inpatient Pulmonology Consult  Once        Specialty:  Pulmonary Disease  Provider:  Osmani Shine MD    08/15/24 1555    08/11/24 1818  Inpatient Access Center Consult  Once        Provider:  (Not yet assigned)    08/11/24 1817    08/11/24 0839  Inpatient Nephrology Consult  Once,   Status:  Canceled        Specialty:  Nephrology  Provider:  (Not yet assigned)    08/11/24 0838    08/10/24 1511  Inpatient Case Management  Consult  Once        Provider:  (Not yet assigned)    08/10/24 1510    08/10/24 1445  Inpatient Cardiology  Consult  Once,   Status:  Canceled        Specialty:  Cardiology  Provider:  Magnus Olivas III, MD    08/10/24 1446    08/10/24 1242  Nephrology (on -call MD unless specified)  Once,   Status:  Canceled        Specialty:  Nephrology  Provider:  Kody Padilla MD    08/10/24 1241    08/10/24 1230  [facility] (on-call MD unless specified) Details  Once,   Status:  Canceled        Specialty:  Hospitalist  Provider:  Tai Rodriguez DO    08/10/24 1229                  Procedures     * Surgery not found *    Imaging Results (All)       Procedure Component Value Units Date/Time    CT Chest Without Contrast Diagnostic [[id number]] Collected: 08/17/24 1728     Updated: 08/17/24 1748    Narrative:      CT CHEST WITHOUT IV CONTRAST     HISTORY: Possible left-sided pneumothorax     TECHNIQUE: Radiation dose reduction techniques were utilized, including  automated exposure control and exposure modulation based on body size.   3 mm images were obtained through the chest without IV contrast.     COMPARISON: CT chest 8/15/2021     FINDINGS:  Evaluation is suboptimal without intravenous contrast.     Mediastinal adenopathy is present. Index subcarinal node measures 1.4 cm  in short axis dimension, as before. Index aorticopulmonary window node  measuring 1 cm, as before. No significant pericardial effusion. At least  moderate coronary artery calcification or coronary artery stents are  present. There is mild to moderate emphysema. No axillary adenopathy by  size criteria.     Moderate right and small left pleural effusions are present. The  right-sided effusion appears to have increased since 8/15/2024. Patchy  groundglass and pulmonary opacification within the bilateral lungs is  present and also has mildly increased since 8/15/2024. No pneumothorax  is seen.     No suspicious lytic or blastic osseous lesions.       Impression:      1.  No pneumothorax is seen. A linear area of hyperlucency within the  periphery of the lingula  likely corresponds with the linear area seen on  recent chest radiograph.  2.  Small to moderate right and small left pleural effusions with  interval increase on the right. The degree of patchy groundglass and  pulmonary opacification scattered throughout the bilateral lungs has  also mildly increased.  3.  Mediastinal adenopathy, as before. Attention on follow-up with chest  CT in 8 weeks is recommended to ensure stability and for resolution and  exclude any possibility of neoplasm.  4.  Other findings above.           This report was finalized on 8/17/2024 5:45 PM by Dr. Jeremy Reynolds M.D  on Workstation: [id number]       XR Chest PA & Lateral [[id number]] Collected: 08/17/24 1607     Updated: 08/17/24 1619    Narrative:      Chest radiograph     HISTORY: Shortness of breath     TECHNIQUE: Two PA and lateral radiographs     COMPARISON: Chest radiographs dating back to 8/6/2017       Impression:      FINDINGS AND IMPRESSION:  Pleural effusion is present posteriorly.     There are median sternotomy wires. Multiple left-sided rib fractures are  present, not well evaluated and best seen on recent CT chest 8/15/2024.  There is concern for a small left pneumothorax at the lung bases, not  seen on 8/13/2024. Further evaluation with chest CT is recommended to  better characterize these findings.     Bibasilar pulmonary opacification, left greater than right, has worsened  since 8/13/2024. Cardiac silhouette is mild to moderately in size. Gas  bubble underlying the left hemidiaphragm appears to be contiguous with  the adjacent stomach and is favored to be related to eventration.  However, attention above-mentioned CT chest is recommended to exclude  the small possibility of free peritoneal air although this is considered  less likely.     Above findings were discussed with Jose, the patient's nurse.                       This report was finalized on 8/17/2024 4:15 PM by Dr. Jeremy Reynolds M.D  on Workstation: [id number]        CT Chest Without Contrast Diagnostic [[id number]] Collected: 08/15/24 0956     Updated: 08/15/24 1008    Narrative:      CT CHEST WO CONTRAST DIAGNOSTIC-     INDICATION: Dyspnea on exertion     COMPARISON: None     TECHNIQUE:  Routine CT chest without IV contrast. Coronal and sagittal reformats.  Radiation dose reduction techniques were utilized, including automated  exposure control and exposure modulation based on body size.     FINDINGS:      Chest wall: Gynecomastia. No lymphadenopathy.     Mediastinum: Three-vessel coronary artery atherosclerotic  calcifications. Moderate cardiomegaly. No pericardial effusion. Enlarged  main pulmonary artery measuring 3.8 cm. Mild mediastinal  lymphadenopathy. For example, subcarinal lymph node, series 2, axial  image 50, measures 1.5 cm. For example, AP window lymph node, series 2,  axial image 40, measures 1.0 cm. No hilar lymphadenopathy.     Lungs/pleura: Mild right pleural effusion. Small left pleural effusion  with loculated pleural fluid seen in the major fissure. Airways wall  thickening. Respiratory motion artifact. Mild centrilobular emphysema.  Subpleural blebs seen at the apices. Small multifocal mostly groundglass  opacities in the right upper lobe. For example, peripheral focal  groundglass opacity in the apical right upper lobe, series 3, axial mage  26. Subsegmental atelectasis in the right middle lobe. Heterogeneous  dependent right lower lobe opacities, with volume loss. Small multifocal  left upper lobe opacities, appearing predominantly groundglass.  Heterogeneous left lower lobe opacities seen in the dependent lung, with  volume loss, suspect subsegmental atelectasis. Calcified pulmonary  nodule in the left lower lobe base, consistent with prior granulomatous  infection.     Upper abdomen: Mild pancreatic lipomatosis. Symmetric perinephric edema.     Osseous structures: Median sternotomy. Old left posterior rib fractures.       Impression:         1. Mild  right and small left pleural effusion. Loculated pleural fluid  seen in the left major fissure.  2. Airways disease and emphysema.  3. Small multifocal opacities in the upper lobes. Could be multifocal  infection. Follow-up to resolution.  4. Dependent lower lobe opacities, with volume loss, favor atelectasis.  5. Moderate cardiomegaly   6. Enlarged main pulmonary artery, can be seen with pulmonary artery  hypertension.  7. Mild nonspecific mediastinal lymphadenopathy     This report was finalized on 8/15/2024 10:05 AM by Dr. Javi Sutton M.D on Workstation: [id number]       XR Chest PA & Lateral [[id number]] Collected: 08/13/24 1447     Updated: 08/13/24 1453    Narrative:      XR CHEST PA AND LATERAL-     HISTORY: Male who is 64 years-old, short of breath     TECHNIQUE: Frontal and lateral views of the chest     COMPARISON: 8/10/2024     FINDINGS: The heart is enlarged. Sternotomy wires are present. Aorta is  tortuous. Pulmonary vasculature again shows mild central prominence,  similar to prior exam. Patchy density at the mid to lower lungs appears  increased, and may represent edema an/or pneumonia, follow-up suggested.  Minimal pleural effusions are apparent. No pneumothorax. Old left rib  fractures are evident.       Impression:      As described.     This report was finalized on 8/13/2024 2:50 PM by Dr. Dustin Huffman M.D on Workstation: [id number]       XR Chest 1 View [[id number]] Collected: 08/10/24 1225     Updated: 08/10/24 1231    Narrative:      Emergency portable view of the chest on 8/10/2024     CLINICAL HISTORY: This 64-year-old male patient with lower extremity  edema     This correlated to a remote prior PA and lateral chest x-ray and  8/6/2017.     FINDINGS: There are multiple sternal wires miso markers from previous  cardiac surgery. The cardiomediastinal silhouette is enlarged. There is  mild central pulmonary vascular engorgement. There is very minimal  interstitial prominence in the  lungs and minimal interstitial edema  cannot be excluded. I see no airspace consolidation. There is very  minimal blunting of the right lateral costophrenic angle suggesting a  tiny right effusion. There are old fractures of the left seventh eighth  and ninth lateral ribs.        Impression:      1. Patient is had a previous median sternotomy and has enlarged  cardiomediastinal silhouette and mild central pulmonary vascular  engorgement. There is very minimal interstitial prominence in lungs that  may be chronic although difficult exclude minimal interstitial edema and  correlate clinically. There is the suggestion of a tiny right effusion  minimal blunting the right lateral costophrenic angle. No additional  active disease is seen in the chest.     2. There are old healed fracture of the left seventh through ninth  lateral ribs.     This report was finalized on 8/10/2024 12:28 PM by Dr. Duke Langston M.D  on Workstation: [id number]               Results for orders placed during the hospital encounter of 08/10/24    Adult Transthoracic Echo Limited W/ Cont if Necessary Per Protocol    Interpretation Summary    Limited echocardiogram to assess LV function.    Left ventricular systolic function is normal. Calculated left ventricular EF = 54.4%    Mitral valve is not fully visualized or assessed but there appears to be moderate mitral regurgitation.    Pertinent Labs     Results from last 7 days   Lab Units 08/19/24  0526 08/18/24  0613 08/17/24  0424 08/16/24  0554   WBC 10*3/mm3 5.77 5.60 6.19 6.63   HEMOGLOBIN g/dL 9.4* 10.1* 9.6* 9.6*   PLATELETS 10*3/mm3 147 152 148 162     Results from last 7 days   Lab Units 08/19/24  0526 08/18/24  0613 08/17/24  0424 08/16/24  0554   SODIUM mmol/L 137 140 137 137   POTASSIUM mmol/L 3.9 4.0 4.3 3.6   CHLORIDE mmol/L 103 105 104 101   CO2 mmol/L 23.3 26.0 23.6 24.8   BUN mg/dL 28* 29* 28* 32*   CREATININE mg/dL 1.59* 1.64* 1.53* 1.85*   GLUCOSE mg/dL 104* 80 96 89   EGFR  "mL/min/1.73 48.5* 46.7* 50.8* 40.4*     Results from last 7 days   Lab Units 08/19/24  0526 08/18/24  0613 08/17/24  0424 08/16/24  0554   ALBUMIN g/dL 3.3* 3.6 3.3* 3.4*   BILIRUBIN mg/dL 1.7* 2.2* 2.1* 2.4*   ALK PHOS U/L 261* 304* 279* 287*   AST (SGOT) U/L 89* 132* 166* 182*   ALT (SGPT) U/L 101* 121* 123* 112*     Results from last 7 days   Lab Units 08/19/24  0526 08/18/24  0613 08/17/24  0424 08/16/24  0554 08/15/24  0408 08/14/24  0615 08/13/24  0443   CALCIUM mg/dL 8.9 9.1 9.0 9.0 8.8 8.7 8.2*   ALBUMIN g/dL 3.3* 3.6 3.3* 3.4* 3.7 3.5 3.5   MAGNESIUM mg/dL  --   --   --  2.3 2.2 2.3 2.4   PHOSPHORUS mg/dL  --   --   --  3.0 2.5 2.8 2.5     Results from last 7 days   Lab Units 08/15/24  1805 08/13/24  1218   AMMONIA umol/L 26 19     Results from last 7 days   Lab Units 08/14/24  0615   PROBNP pg/mL 19,631.0*           Invalid input(s): \"LDLCALC\"          Test Results Pending at Discharge       Discharge Details        Discharge Medications        New Medications        Instructions Start Date   hydrOXYzine pamoate 25 MG capsule  Commonly known as: VISTARIL   25 mg, Oral, 3 Times Daily PRN      metoprolol succinate XL 25 MG 24 hr tablet  Commonly known as: TOPROL-XL   12.5 mg, Oral, Every 24 Hours Scheduled   Start Date: August 20, 2024     pantoprazole 40 MG EC tablet  Commonly known as: PROTONIX   40 mg, Oral, 2 Times Daily Before Meals             Continue These Medications        Instructions Start Date   albuterol sulfate  (90 Base) MCG/ACT inhaler  Commonly known as: PROVENTIL HFA;VENTOLIN HFA;PROAIR HFA   2 puffs, Inhalation, Every 4 Hours PRN      Anoro Ellipta 62.5-25 MCG/ACT aerosol powder  inhaler  Generic drug: Umeclidinium-Vilanterol   1 puff, Inhalation, Daily - RT      atorvastatin 80 MG tablet  Commonly known as: LIPITOR   80 mg, Oral, Nightly      busPIRone 15 MG tablet  Commonly known as: BUSPAR   15 mg, Oral, 2 Times Daily PRN, Has not used in the past 6 months but has if needed     "   furosemide 20 MG tablet  Commonly known as: LASIX   40 mg, Oral, 2 Times Daily      potassium chloride 10 MEQ CR tablet   20 mEq, Oral, 2 Times Daily, Has not filled in 3 months and has been using OTC products, however has not been consistently taking these.       rivaroxaban 15 MG tablet  Commonly known as: XARELTO   15 mg, Oral, Daily With Dinner      sertraline 100 MG tablet  Commonly known as: ZOLOFT   100 mg, Oral, Nightly      spironolactone 25 MG tablet  Commonly known as: ALDACTONE   25 mg, Oral, Daily             Stop These Medications      amiodarone 200 MG tablet  Commonly known as: PACERONE     losartan 25 MG tablet  Commonly known as: COZAAR     metoprolol tartrate 25 MG tablet  Commonly known as: LOPRESSOR              No Known Allergies    Discharge Disposition:  Home or Self Care      Discharge Diet:  No active diet order      Discharge Activity:   Activity Instructions       Activity as Tolerated              CODE STATUS:    Code Status and Medical Interventions: CPR (Attempt to Resuscitate); Full Support   Ordered at: 08/10/24 8369     Code Status (Patient has no pulse and is not breathing):    CPR (Attempt to Resuscitate)     Medical Interventions (Patient has pulse or is breathing):    Full Support       No future appointments.  Additional Instructions for the Follow-ups that You Need to Schedule       Discharge Follow-up with PCP   As directed       Currently Documented PCP:    Ginette Bryant MD    PCP Phone Number:    [phone]     Follow Up Details: 1 week        Discharge Follow-up with Specified Provider: ; 2 Weeks   As directed      To:    Follow Up: 2 Weeks        Discharge Follow-up with Specified Provider: ; 2 Weeks   As directed      To:    Follow Up: 2 Weeks        Discharge Follow-up with Specified Provider: [facility]; 2 Weeks   As directed      To: [facility]   Follow Up: 2 Weeks                Follow-up Information       Tanner Hernandez,  Follow up in 1 week(s).    Specialty: Pulmonary Disease  Why: With PFTs in office  Contact information:  [address]  [address]  [facility] [id number] [phone]               Ginette Bryant MD .    Specialty: Family Medicine  Why: 1 week  Contact information:  [address]  [facility] [id number] [phone]                             Additional Instructions for the Follow-ups that You Need to Schedule       Discharge Follow-up with PCP   As directed       Currently Documented PCP:    Ginette Bryant MD    PCP Phone Number:    [phone]     Follow Up Details: 1 week        Discharge Follow-up with Specified Provider: ; 2 Weeks   As directed      To:    Follow Up: 2 Weeks        Discharge Follow-up with Specified Provider: ; 2 Weeks   As directed      To:    Follow Up: 2 Weeks        Discharge Follow-up with Specified Provider: [facility]; 2 Weeks   As directed      To: [facility]   Follow Up: 2 Weeks            Time Spent on Discharge:  Greater than 30 minutes      Pasha Wells MD  [facility]  08/19/24  16:23 EDT

## 2024-08-19 NOTE — PROGRESS NOTES
[facility] INPATIENT PROGRESS NOTE         [address]    2024      PATIENT IDENTIFICATION:  Name: Javi Doran ADMIT: 8/10/2024   : 1960  PCP: Ginette Bryant MD    MRN: [id number] LOS: 8 days   AGE/SEX: 64 y.o. male  ROOM: [facility]                     LOS 8    Reason for visit: Shortness of breath with pulmonary filtrates and pleural effusions      SUBJECTIVE:      Complains of feeling dizzy with activity and very weak.  No productive cough, chest pain or wheezing.  Some mild coarse breath sounds on the left on auscultation which improved with cough this morning.      Objective   OBJECTIVE:    Vital Sign Min/Max for last 24 hours  Temp  Min: 97.3 °F (36.3 °C)  Max: 97.8 °F (36.6 °C)   BP  Min: 98/68  Max: 109/75   Pulse  Min: 57  Max: 73   Resp  Min: 16  Max: 18   SpO2  Min: 91 %  Max: 100 %   No data recorded   No data recorded    Vitals:    24 0002 24 0705 24 0736 24 0740   BP: 98/68 107/78     BP Location: Right arm Right arm     Patient Position: Lying Lying     Pulse: 59 61 60 58   Resp: 16 16 18 18   Temp: 97.3 °F (36.3 °C) 97.5 °F (36.4 °C)     TempSrc: Oral Oral     SpO2: 100% 91% 100% 100%   Weight:       Height:                08/15/24  1536 24  0600 24  0309   Weight: 74.8 kg (165 lb) 76.9 kg (169 lb 8.5 oz) 76.8 kg (169 lb 5 oz)       Body mass index is 22.96 kg/m².                          Body mass index is 22.96 kg/m².    Intake/Output Summary (Last 24 hours) at 2024 0856  Last data filed at 2024 1700  Gross per 24 hour   Intake 222 ml   Output 700 ml   Net -478 ml         Exam:  GEN:  No distress, appears stated age  EYES:   PERRL, anicteric sclerae  ENT:    External ears/nose normal, OP clear  NECK:  No adenopathy, midline trachea  LUNGS: Normal chest on inspection, palpation and diminished breath sounds at bases on auscultation  CV:  Normal S1S2, without murmur  ABD:  Nontender, nondistended, no  hepatosplenomegaly, +BS  EXT:  No edema.  No cyanosis or clubbing.  No mottling and normal cap refill.    Assessment     Scheduled meds:  amitriptyline, 25 mg, Oral, Nightly  budesonide-formoterol, 2 puff, Inhalation, BID - RT  busPIRone, 7.5 mg, Oral, TID  folic acid, 1 mg, Oral, Daily  furosemide, 20 mg, Oral, Daily  ipratropium-albuterol, 3 mL, Nebulization, 4x Daily - RT  metoprolol succinate XL, 12.5 mg, Oral, Q24H  multivitamin with minerals, 1 tablet, Oral, Daily  pantoprazole, 40 mg, Oral, BID AC  rivaroxaban, 15 mg, Oral, Daily With Dinner  sertraline, 100 mg, Oral, Nightly  sodium chloride, 10 mL, Intravenous, Q12H  thiamine, 100 mg, Oral, Daily  tiotropium bromide monohydrate, 2 puff, Inhalation, Daily - RT      IV meds:                         Data Review:  Results from last 7 days   Lab Units 08/19/24  0526 08/18/24  0613 08/17/24 0424 08/16/24  0554 08/15/24  1552 08/15/24  0408   SODIUM mmol/L 137 140 137 137  --  137   POTASSIUM mmol/L 3.9 4.0 4.3 3.6 3.5 3.1*   CHLORIDE mmol/L 103 105 104 101  --  98   CO2 mmol/L 23.3 26.0 23.6 24.8  --  26.0   BUN mg/dL 28* 29* 28* 32*  --  35*   CREATININE mg/dL 1.59* 1.64* 1.53* 1.85*  --  2.02*   GLUCOSE mg/dL 104* 80 96 89  --  88   CALCIUM mg/dL 8.9 9.1 9.0 9.0  --  8.8         Estimated Creatinine Clearance: 51.7 mL/min (A) (by C-G formula based on SCr of 1.59 mg/dL (H)).  Results from last 7 days   Lab Units 08/19/24  0526 08/18/24  0613 08/17/24  0424 08/16/24  0554 08/14/24  0615   WBC 10*3/mm3 5.77 5.60 6.19 6.63 8.86   HEMOGLOBIN g/dL 9.4* 10.1* 9.6* 9.6* 10.3*   PLATELETS 10*3/mm3 147 152 148 162 237     Results from last 7 days   Lab Units 08/19/24  0526 08/16/24  0938   INR  2.39* 3.00*     Results from last 7 days   Lab Units 08/19/24  0526 08/18/24  0613 08/17/24  0424 08/16/24  0554 08/15/24  0408   ALT (SGPT) U/L 101* 121* 123* 112* 93*   AST (SGOT) U/L 89* 132* 166* 182* 145*     Results from last 7 days   Lab Units 08/15/24  1719   PH, ARTERIAL  "pH units 7.439   PO2 ART mm Hg 59.9*   PCO2, ARTERIAL mm Hg 38.7   HCO3 ART mmol/L 26.2             No results found for: \"HGBA1C\", \"POCGLU\"      Imaging reviewed  2D echo reviewed: EF 55%    CT chest 8/17 reviewed moderate right and small left pleural effusions.  Patchy groundglass opacities mildly increased.  Mediastinal adenopathy noted.      Chest x-ray 8/17 reviewed left greater than right airspace disease worse compared to 8/13.  Moderate enlarged cardiac silhouette.    Microbiology reviewed  RVP negative          Active Hospital Problems    Diagnosis  POA    EDMOND (acute kidney injury) [N17.9]  Yes    Alcohol withdrawal syndrome without complication [F10.930]  Yes    Essential hypertension [I10]  Yes    Anemia [D64.9]  Yes    Acute on chronic systolic congestive heart failure [I50.23]  Yes    Anxiety [F41.9]  Yes      Resolved Hospital Problems    Diagnosis Date Resolved POA    **Hypokalemia [E87.6] 08/14/2024 Yes         ASSESSMENT:  AMS  Dyspnea  Bilateral pleural effusions  Patchy bilateral pulmonary infiltrates  Bibasilar infiltrates, likely atelectasis  COPD, requires PFT's  Current cig smoker  EDMOND  Acute on chronic HFrEF, EF 34%: Repeat 55%  Afib  Hx MV repair  CAD with hx PCI  Alcoholism  Anemia      PLAN:  Changed nebulized medicines to Symbicort and Spiriva.  On diuretics per cardiology and admitting service.  Encourage pulmonary toilet.  Repeat CT in 8 weeks for follow-up of mediastinal adenopathy.  May simply be reactive.  Unable to rule out potential neoplastic process.  Will have him follow-up with us in the office after discharge.  Will schedule with Dr. Hernandez.    Hal Mckeon MD  Pulmonary and Critical Care Medicine  [facility], [facility]  8/19/2024    08:56 EDT     "

## 2024-08-19 NOTE — PROGRESS NOTES
The patient is scheduled for discharge later today.  I will go through the motions of [facility] to provide him with information regarding maintenance of sobriety following discharge but have little confidence that he will follow through with these recommendations.  Recommend continuation of as needed Vistaril as well as scheduled Zoloft and BuSpar and as needed amitriptyline at discharge.

## 2024-08-19 NOTE — CASE MANAGEMENT/SOCIAL WORK
Case Management Discharge Note      Final Note: Patient discharged home with family. Orders reviewed no further discharge needs.         Selected Continued Care - Discharged on 8/19/2024 Admission date: 8/10/2024 - Discharge disposition: Home or Self Care      Destination    No services have been selected for the patient.                Durable Medical Equipment    No services have been selected for the patient.                Dialysis/Infusion    No services have been selected for the patient.                Home Medical Care    No services have been selected for the patient.                Therapy    No services have been selected for the patient.                Community Resources    No services have been selected for the patient.                Community & DME    No services have been selected for the patient.                         Final Discharge Disposition Code: 01 - home or self-care

## 2024-08-19 NOTE — NURSING NOTE
"[facility] follow-up d/t anxiety and depression/ETOH    Patient RIB. The patient reported \"I'm a lot better than when I came in.\" The patient rated anxiety and depression 7/10. The patient denied SI. The patient denies issues with ETOH withdrawal. Patient is off CIWA protocol. The patient acknowledged receiving resources from [facility] and denied any questions. Access following.   "

## 2024-08-19 NOTE — PLAN OF CARE
Problem: Adult Inpatient Plan of Care  Goal: Absence of Hospital-Acquired Illness or Injury  Intervention: Identify and Manage Fall Risk  Recent Flowsheet Documentation  Taken 8/19/2024 0415 by Louann Ochoa RN  Safety Promotion/Fall Prevention:   assistive device/personal items within reach   clutter free environment maintained   room organization consistent   safety round/check completed  Taken 8/19/2024 0010 by Louann Ochoa RN  Safety Promotion/Fall Prevention: assistive device/personal items within reach  Taken 8/18/2024 2015 by Louann Ochoa RN  Safety Promotion/Fall Prevention:   room organization consistent   safety round/check completed   fall prevention program maintained     Problem: Fall Injury Risk  Goal: Absence of Fall and Fall-Related Injury  Intervention: Identify and Manage Contributors  Recent Flowsheet Documentation  Taken 8/18/2024 2015 by Louann Ochoa RN  Self-Care Promotion: BADL personal objects within reach     Problem: Fall Injury Risk  Goal: Absence of Fall and Fall-Related Injury  Intervention: Promote Injury-Free Environment  Recent Flowsheet Documentation  Taken 8/19/2024 0415 by Louann Ochoa RN  Safety Promotion/Fall Prevention:   assistive device/personal items within reach   clutter free environment maintained   room organization consistent   safety round/check completed  Taken 8/19/2024 0010 by Louann Ochoa RN  Safety Promotion/Fall Prevention: assistive device/personal items within reach  Taken 8/18/2024 2015 by Louann Ochoa RN  Safety Promotion/Fall Prevention:   room organization consistent   safety round/check completed   fall prevention program maintained     Problem: Pain Acute  Goal: Acceptable Pain Control and Functional Ability  Intervention: Prevent or Manage Pain  Recent Flowsheet Documentation  Taken 8/19/2024 0010 by Louann Ochoa RN  Sensory Stimulation Regulation: lighting decreased  Sleep/Rest Enhancement:   relaxation techniques promoted   room  darkened  Taken 8/18/2024 2015 by Louann Ochoa RN  Sensory Stimulation Regulation:   lighting decreased   care clustered  Sleep/Rest Enhancement: relaxation techniques promoted     Problem: Skin Injury Risk Increased  Goal: Skin Health and Integrity  Intervention: Optimize Skin Protection  Recent Flowsheet Documentation  Taken 8/19/2024 0600 by Louann Ochoa RN  Head of Bed (HOB) Positioning: HOB elevated  Taken 8/19/2024 0415 by Louann Ochoa RN  Head of Bed (HOB) Positioning: HOB elevated  Taken 8/19/2024 0010 by Louann Ochoa RN  Head of Bed (HOB) Positioning: HOB at 30 degrees  Taken 8/18/2024 2015 by Louann Ochoa RN  Pressure Reduction Techniques: frequent weight shift encouraged  Head of Bed (HOB) Positioning: HOB at 30 degrees  Pressure Reduction Devices: alternating pressure pump (ADD)  Skin Protection: adhesive use limited   Goal Outcome Evaluation:  Plan of Care Reviewed With: patient           Outcome Evaluation: A/Ox3, VSS, mood was good this shift and talked about his family visit. Prn medication given and he slept this shift after eating his evening snack. denied any pain and no BM or falls this shift. Staff WCTM.

## 2024-08-19 NOTE — PROGRESS NOTES
LOS: 8 days   Patient Care Team:  Ginette Bryant MD as PCP - General (Family Medicine)    Chief Complaint: Follow-up alcohol abuse and withdrawal, acute on chronic systolic CHF, hypertrophic cardiomyopathy status post myectomy and mitral valve replacement, coronary artery disease, left bundle branch block, prolonged QT interval.    Interval History: Doing better in general.  He is still short of breath with exertion or movement.  No chest pain.    Vital Signs:  Temp:  [97.3 °F (36.3 °C)-97.8 °F (36.6 °C)] 97.5 °F (36.4 °C)  Heart Rate:  [57-73] 60  Resp:  [16-18] 18  BP: ()/(68-81) 107/78    Intake/Output Summary (Last 24 hours) at 8/19/2024 1307  Last data filed at 8/19/2024 0925  Gross per 24 hour   Intake 462 ml   Output 750 ml   Net -288 ml       Physical Exam:   General Appearance:    No acute distress, alert and oriented x 4.   Lungs:     Mildly decreased breath sounds at the bases.    Heart:    Regular rhythm and normal rate.  Distant, but no obvious murmur.   Abdomen:     Soft, nontender, nondistended.    Extremities:   Trace edema of the lower extremities.     Results Review:    Results from last 7 days   Lab Units 08/19/24  0526   SODIUM mmol/L 137   POTASSIUM mmol/L 3.9   CHLORIDE mmol/L 103   CO2 mmol/L 23.3   BUN mg/dL 28*   CREATININE mg/dL 1.59*   GLUCOSE mg/dL 104*   CALCIUM mg/dL 8.9           Results from last 7 days   Lab Units 08/19/24  0526   WBC 10*3/mm3 5.77   HEMOGLOBIN g/dL 9.4*   HEMATOCRIT % 30.1*   PLATELETS 10*3/mm3 147     Results from last 7 days   Lab Units 08/19/24  0526 08/16/24  0938   INR  2.39* 3.00*         Results from last 7 days   Lab Units 08/16/24  0554   MAGNESIUM mg/dL 2.3           I reviewed the patient's new clinical results.        Assessment:  1.  Acute on chronic systolic CHF (EF in July 2024 at 34%).  EF 54% by echo on 8/15/2024 (closer to 40 to 45% on my viewing).  2.  History of hypertrophic cardiomyopathy, status post myectomy  3.   Paroxysmal atrial fibrillation, status post maze procedure at the time of his myectomy  4.  History of mitral valve repair at the time of myectomy  5.  Coronary artery disease with multiple stents in the past  6.  Left bundle branch block  7.  Prolonged QT interval  8.  Acute kidney injury  9.  Alcohol abuse with withdrawal  10.  Emphysema by CT scan  11.  Suspected pulmonary hypertension  12.  Hypokalemia  13.  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  Alcoholic hepatitis  15.  Multifactorial anemia  16.  Coagulopathy with elevated INR    Plan:  -Medical issues.  His guideline directed medical therapy for the cardiomyopathy is limited by his kidney disease and lower blood pressure.  Continue Toprol-XL 12.5 mg for now.      -Chronic left bundle branch block, although the QT interval was still significantly prolonged even with a left bundle.  Stopped amiodarone several days ago.  He is maintaining sinus rhythm.  Continue Toprol-XL.    -I reviewed the echocardiogram images.  The EF was closer to 40 to 45% on my viewing.  The mitral regurgitation was insignificant  And not severe.    -Nephrology dosing diuretics.  Continue Lasix 20 mg/day.    -On Xarelto, and also has a coagulopathy related to his liver disease.    -Advised to quit drinking alcohol given his heart issues.  This also affects many of his other health issues.    -No plans for thoracentesis at this point.  He is scheduled for discharge later today.  He will need to follow-up with his regular cardiologist at [place] as an outpatient.    Dandy Kilpatrick MD  08/19/24  13:07 EDT

## 2024-08-19 NOTE — PLAN OF CARE
Problem: Adult Inpatient Plan of Care  Goal: Plan of Care Review  Outcome: Met  Goal: Patient-Specific Goal (Individualized)  Outcome: Met  Goal: Absence of Hospital-Acquired Illness or Injury  Outcome: Met  Goal: Optimal Comfort and Wellbeing  Outcome: Met  Goal: Readiness for Transition of Care  Outcome: Met     Problem: Hypertension Comorbidity  Goal: Blood Pressure in Desired Range  Outcome: Met     Problem: Fall Injury Risk  Goal: Absence of Fall and Fall-Related Injury  Outcome: Met     Problem: Pain Acute  Goal: Acceptable Pain Control and Functional Ability  Outcome: Met     Problem: Alcohol Withdrawal  Goal: Alcohol Withdrawal Symptom Control  Outcome: Met     Problem: Acute Neurologic Deterioration (Alcohol Withdrawal)  Goal: Optimal Neurologic Function  Outcome: Met     Problem: Nausea and Vomiting  Goal: Fluid and Electrolyte Balance  Outcome: Met     Problem: Electrolyte Imbalance  Goal: Electrolyte Balance  Outcome: Met     Problem: Skin Injury Risk Increased  Goal: Skin Health and Integrity  Outcome: Met   Goal Outcome Evaluation:    Plan of care reviewed with : Patient  Progress: Improving  Outcome Evaluation : Pt with no acute events, VSS, SR, in RA,  in NAD, Call light in reach, able to make needs known.

## 2024-08-20 NOTE — OUTREACH NOTE
Prep Survey      Flowsheet Row Responses   [facility] facility patient discharged from? [place]   Is LACE score < 7 ? No   Eligibility Readm Mgmt   Discharge diagnosis Hypokalemia Resolved   Does the patient have one of the following disease processes/diagnoses(primary or secondary)? Other   Does the patient have Home health ordered? No   Is there a DME ordered? No   Prep survey completed? Yes            Thelma VAZQUEZ - Registered Nurse

## 2024-08-22 ENCOUNTER — READMISSION MANAGEMENT (OUTPATIENT)
Dept: CALL CENTER | Facility: HOSPITAL | Age: 64
End: 2024-08-22
Payer: MEDICARE

## 2024-08-22 NOTE — OUTREACH NOTE
Medical Week 1 Survey      Flowsheet Row Responses   [facility] patient discharged from? [place]   Does the patient have one of the following disease processes/diagnoses(primary or secondary)? Other   Week 1 attempt successful? No   Unsuccessful attempts Attempt 1            Joselyn Louis Registered Nurse

## 2024-08-26 ENCOUNTER — READMISSION MANAGEMENT (OUTPATIENT)
Dept: CALL CENTER | Facility: HOSPITAL | Age: 64
End: 2024-08-26
Payer: MEDICARE

## 2024-08-26 LAB
QT INTERVAL: 525 MS
QT INTERVAL: 525 MS
QT INTERVAL: 530 MS
QTC INTERVAL: 516 MS
QTC INTERVAL: 516 MS
QTC INTERVAL: 539 MS

## 2024-08-26 NOTE — OUTREACH NOTE
Medical Week 1 Survey      Flowsheet Row Responses   [facility] patient discharged from? [place]   Does the patient have one of the following disease processes/diagnoses(primary or secondary)? Other   Week 1 attempt successful? No   Unsuccessful attempts Attempt 2   Revoke Phone number issues  [no number listed for patient and verbal release list Bruce but no number to reach him]            Shayy PEPE - Registered Nurse

## 2024-09-04 ENCOUNTER — APPOINTMENT (OUTPATIENT)
Dept: GENERAL RADIOLOGY | Facility: HOSPITAL | Age: 64
DRG: 281 | End: 2024-09-04
Payer: MEDICARE

## 2024-09-04 ENCOUNTER — HOSPITAL ENCOUNTER (INPATIENT)
Facility: HOSPITAL | Age: 64
LOS: 1 days | Discharge: HOME | DRG: 281 | End: 2024-09-07
Attending: EMERGENCY MEDICINE | Admitting: STUDENT IN AN ORGANIZED HEALTH CARE EDUCATION/TRAINING PROGRAM
Payer: MEDICARE

## 2024-09-04 DIAGNOSIS — R79.89 ELEVATED TROPONIN: ICD-10-CM

## 2024-09-04 DIAGNOSIS — N18.9 CHRONIC KIDNEY DISEASE, UNSPECIFIED CKD STAGE: ICD-10-CM

## 2024-09-04 DIAGNOSIS — E87.6 HYPOKALEMIA: ICD-10-CM

## 2024-09-04 DIAGNOSIS — I44.7 LBBB (LEFT BUNDLE BRANCH BLOCK): ICD-10-CM

## 2024-09-04 DIAGNOSIS — E83.42 HYPOMAGNESEMIA: ICD-10-CM

## 2024-09-04 DIAGNOSIS — D64.9 ANEMIA, UNSPECIFIED TYPE: ICD-10-CM

## 2024-09-04 DIAGNOSIS — R07.9 CHEST PAIN, UNSPECIFIED TYPE: Primary | ICD-10-CM

## 2024-09-04 LAB
ALBUMIN SERPL-MCNC: 4.1 G/DL (ref 3.5–5.2)
ALBUMIN/GLOB SERPL: 1.5 G/DL
ALP SERPL-CCNC: 193 U/L (ref 39–117)
ALT SERPL W P-5'-P-CCNC: 16 U/L (ref 1–41)
ANION GAP SERPL CALCULATED.3IONS-SCNC: 14 MMOL/L (ref 5–15)
APTT PPP: 31.3 SECONDS (ref 22.7–35.4)
AST SERPL-CCNC: 29 U/L (ref 1–40)
B PARAPERT DNA SPEC QL NAA+PROBE: NOT DETECTED
B PERT DNA SPEC QL NAA+PROBE: NOT DETECTED
BASOPHILS # BLD AUTO: 0.04 10*3/MM3 (ref 0–0.2)
BASOPHILS NFR BLD AUTO: 0.7 % (ref 0–1.5)
BILIRUB SERPL-MCNC: 1.2 MG/DL (ref 0–1.2)
BUN SERPL-MCNC: 6 MG/DL (ref 8–23)
BUN/CREAT SERPL: 4.4 (ref 7–25)
C PNEUM DNA NPH QL NAA+NON-PROBE: NOT DETECTED
CALCIUM SPEC-SCNC: 8.6 MG/DL (ref 8.6–10.5)
CHLORIDE SERPL-SCNC: 94 MMOL/L (ref 98–107)
CO2 SERPL-SCNC: 29 MMOL/L (ref 22–29)
CREAT SERPL-MCNC: 1.36 MG/DL (ref 0.76–1.27)
DEPRECATED RDW RBC AUTO: 54 FL (ref 37–54)
EGFRCR SERPLBLD CKD-EPI 2021: 58.5 ML/MIN/1.73
EOSINOPHIL # BLD AUTO: 0.04 10*3/MM3 (ref 0–0.4)
EOSINOPHIL NFR BLD AUTO: 0.7 % (ref 0.3–6.2)
ERYTHROCYTE [DISTWIDTH] IN BLOOD BY AUTOMATED COUNT: 18.3 % (ref 12.3–15.4)
FLUAV SUBTYP SPEC NAA+PROBE: NOT DETECTED
FLUBV RNA ISLT QL NAA+PROBE: NOT DETECTED
GEN 5 2HR TROPONIN T REFLEX: 45 NG/L
GLOBULIN UR ELPH-MCNC: 2.8 GM/DL
GLUCOSE SERPL-MCNC: 109 MG/DL (ref 65–99)
HADV DNA SPEC NAA+PROBE: NOT DETECTED
HCOV 229E RNA SPEC QL NAA+PROBE: NOT DETECTED
HCOV HKU1 RNA SPEC QL NAA+PROBE: NOT DETECTED
HCOV NL63 RNA SPEC QL NAA+PROBE: NOT DETECTED
HCOV OC43 RNA SPEC QL NAA+PROBE: NOT DETECTED
HCT VFR BLD AUTO: 32.7 % (ref 37.5–51)
HGB BLD-MCNC: 10.6 G/DL (ref 13–17.7)
HMPV RNA NPH QL NAA+NON-PROBE: NOT DETECTED
HOLD SPECIMEN: NORMAL
HOLD SPECIMEN: NORMAL
HPIV1 RNA ISLT QL NAA+PROBE: NOT DETECTED
HPIV2 RNA SPEC QL NAA+PROBE: NOT DETECTED
HPIV3 RNA NPH QL NAA+PROBE: NOT DETECTED
HPIV4 P GENE NPH QL NAA+PROBE: NOT DETECTED
IMM GRANULOCYTES # BLD AUTO: 0.03 10*3/MM3 (ref 0–0.05)
IMM GRANULOCYTES NFR BLD AUTO: 0.5 % (ref 0–0.5)
INR PPP: 1.27 (ref 0.9–1.1)
LYMPHOCYTES # BLD AUTO: 1.05 10*3/MM3 (ref 0.7–3.1)
LYMPHOCYTES NFR BLD AUTO: 18.1 % (ref 19.6–45.3)
M PNEUMO IGG SER IA-ACNC: NOT DETECTED
MAGNESIUM SERPL-MCNC: 1.5 MG/DL (ref 1.6–2.4)
MCH RBC QN AUTO: 26.8 PG (ref 26.6–33)
MCHC RBC AUTO-ENTMCNC: 32.4 G/DL (ref 31.5–35.7)
MCV RBC AUTO: 82.8 FL (ref 79–97)
MONOCYTES # BLD AUTO: 0.62 10*3/MM3 (ref 0.1–0.9)
MONOCYTES NFR BLD AUTO: 10.7 % (ref 5–12)
NEUTROPHILS NFR BLD AUTO: 4.03 10*3/MM3 (ref 1.7–7)
NEUTROPHILS NFR BLD AUTO: 69.3 % (ref 42.7–76)
NRBC BLD AUTO-RTO: 0.3 /100 WBC (ref 0–0.2)
NT-PROBNP SERPL-MCNC: 3936 PG/ML (ref 0–900)
PLATELET # BLD AUTO: 197 10*3/MM3 (ref 140–450)
PMV BLD AUTO: 8.3 FL (ref 6–12)
POTASSIUM SERPL-SCNC: 2.3 MMOL/L (ref 3.5–5.2)
POTASSIUM SERPL-SCNC: 2.6 MMOL/L (ref 3.5–5.2)
PROT SERPL-MCNC: 6.9 G/DL (ref 6–8.5)
PROTHROMBIN TIME: 16.1 SECONDS (ref 11.7–14.2)
RBC # BLD AUTO: 3.95 10*6/MM3 (ref 4.14–5.8)
RHINOVIRUS RNA SPEC NAA+PROBE: NOT DETECTED
RSV RNA NPH QL NAA+NON-PROBE: NOT DETECTED
SARS-COV-2 RNA NPH QL NAA+NON-PROBE: NOT DETECTED
SODIUM SERPL-SCNC: 137 MMOL/L (ref 136–145)
TROPONIN T DELTA: -7 NG/L
TROPONIN T SERPL HS-MCNC: 52 NG/L
WBC NRBC COR # BLD AUTO: 5.81 10*3/MM3 (ref 3.4–10.8)
WHOLE BLOOD HOLD COAG: NORMAL
WHOLE BLOOD HOLD SPECIMEN: NORMAL

## 2024-09-04 PROCEDURE — 25010000002 ONDANSETRON PER 1 MG: Performed by: EMERGENCY MEDICINE

## 2024-09-04 PROCEDURE — 94640 AIRWAY INHALATION TREATMENT: CPT

## 2024-09-04 PROCEDURE — 36415 COLL VENOUS BLD VENIPUNCTURE: CPT

## 2024-09-04 PROCEDURE — 84132 ASSAY OF SERUM POTASSIUM: CPT | Performed by: STUDENT IN AN ORGANIZED HEALTH CARE EDUCATION/TRAINING PROGRAM

## 2024-09-04 PROCEDURE — 25010000002 LORAZEPAM PER 2 MG: Performed by: STUDENT IN AN ORGANIZED HEALTH CARE EDUCATION/TRAINING PROGRAM

## 2024-09-04 PROCEDURE — 85025 COMPLETE CBC W/AUTO DIFF WBC: CPT | Performed by: EMERGENCY MEDICINE

## 2024-09-04 PROCEDURE — 83735 ASSAY OF MAGNESIUM: CPT | Performed by: EMERGENCY MEDICINE

## 2024-09-04 PROCEDURE — G0378 HOSPITAL OBSERVATION PER HR: HCPCS

## 2024-09-04 PROCEDURE — 25010000002 DROPERIDOL PER 5 MG: Performed by: EMERGENCY MEDICINE

## 2024-09-04 PROCEDURE — 85730 THROMBOPLASTIN TIME PARTIAL: CPT | Performed by: EMERGENCY MEDICINE

## 2024-09-04 PROCEDURE — 80053 COMPREHEN METABOLIC PANEL: CPT | Performed by: EMERGENCY MEDICINE

## 2024-09-04 PROCEDURE — 93005 ELECTROCARDIOGRAM TRACING: CPT | Performed by: STUDENT IN AN ORGANIZED HEALTH CARE EDUCATION/TRAINING PROGRAM

## 2024-09-04 PROCEDURE — 93010 ELECTROCARDIOGRAM REPORT: CPT | Performed by: INTERNAL MEDICINE

## 2024-09-04 PROCEDURE — 83880 ASSAY OF NATRIURETIC PEPTIDE: CPT | Performed by: EMERGENCY MEDICINE

## 2024-09-04 PROCEDURE — 71045 X-RAY EXAM CHEST 1 VIEW: CPT

## 2024-09-04 PROCEDURE — 25010000002 THIAMINE PER 100 MG: Performed by: STUDENT IN AN ORGANIZED HEALTH CARE EDUCATION/TRAINING PROGRAM

## 2024-09-04 PROCEDURE — 25010000002 POTASSIUM CHLORIDE 10 MEQ/100ML SOLUTION: Performed by: EMERGENCY MEDICINE

## 2024-09-04 PROCEDURE — 99291 CRITICAL CARE FIRST HOUR: CPT

## 2024-09-04 PROCEDURE — 84484 ASSAY OF TROPONIN QUANT: CPT | Performed by: EMERGENCY MEDICINE

## 2024-09-04 PROCEDURE — 94799 UNLISTED PULMONARY SVC/PX: CPT

## 2024-09-04 PROCEDURE — 93005 ELECTROCARDIOGRAM TRACING: CPT | Performed by: EMERGENCY MEDICINE

## 2024-09-04 PROCEDURE — 94761 N-INVAS EAR/PLS OXIMETRY MLT: CPT

## 2024-09-04 PROCEDURE — 99214 OFFICE O/P EST MOD 30 MIN: CPT | Performed by: STUDENT IN AN ORGANIZED HEALTH CARE EDUCATION/TRAINING PROGRAM

## 2024-09-04 PROCEDURE — 0202U NFCT DS 22 TRGT SARS-COV-2: CPT | Performed by: EMERGENCY MEDICINE

## 2024-09-04 PROCEDURE — 85610 PROTHROMBIN TIME: CPT | Performed by: EMERGENCY MEDICINE

## 2024-09-04 PROCEDURE — 25010000002 MAGNESIUM SULFATE 2 GM/50ML SOLUTION: Performed by: EMERGENCY MEDICINE

## 2024-09-04 RX ORDER — MAGNESIUM SULFATE HEPTAHYDRATE 40 MG/ML
2 INJECTION, SOLUTION INTRAVENOUS ONCE
Status: DISCONTINUED | OUTPATIENT
Start: 2024-09-04 | End: 2024-09-04

## 2024-09-04 RX ORDER — LORAZEPAM 2 MG/ML
2 INJECTION INTRAMUSCULAR
Status: DISCONTINUED | OUTPATIENT
Start: 2024-09-04 | End: 2024-09-06

## 2024-09-04 RX ORDER — METOPROLOL SUCCINATE 25 MG/1
12.5 TABLET, EXTENDED RELEASE ORAL
Status: DISCONTINUED | OUTPATIENT
Start: 2024-09-04 | End: 2024-09-07 | Stop reason: HOSPADM

## 2024-09-04 RX ORDER — PANTOPRAZOLE SODIUM 40 MG/1
40 TABLET, DELAYED RELEASE ORAL
Status: DISCONTINUED | OUTPATIENT
Start: 2024-09-04 | End: 2024-09-07 | Stop reason: HOSPADM

## 2024-09-04 RX ORDER — MAGNESIUM SULFATE HEPTAHYDRATE 40 MG/ML
2 INJECTION, SOLUTION INTRAVENOUS
Status: COMPLETED | OUTPATIENT
Start: 2024-09-04 | End: 2024-09-04

## 2024-09-04 RX ORDER — POLYETHYLENE GLYCOL 3350 17 G/17G
17 POWDER, FOR SOLUTION ORAL DAILY PRN
Status: DISCONTINUED | OUTPATIENT
Start: 2024-09-04 | End: 2024-09-07 | Stop reason: HOSPADM

## 2024-09-04 RX ORDER — POTASSIUM CHLORIDE 750 MG/1
40 TABLET, FILM COATED, EXTENDED RELEASE ORAL EVERY 4 HOURS
Status: COMPLETED | OUTPATIENT
Start: 2024-09-04 | End: 2024-09-04

## 2024-09-04 RX ORDER — HYDROXYZINE PAMOATE 25 MG/1
25 CAPSULE ORAL 3 TIMES DAILY PRN
Status: DISCONTINUED | OUTPATIENT
Start: 2024-09-04 | End: 2024-09-04

## 2024-09-04 RX ORDER — ASPIRIN 81 MG/1
81 TABLET ORAL DAILY
Status: DISCONTINUED | OUTPATIENT
Start: 2024-09-04 | End: 2024-09-07 | Stop reason: HOSPADM

## 2024-09-04 RX ORDER — MAGNESIUM SULFATE HEPTAHYDRATE 40 MG/ML
2 INJECTION, SOLUTION INTRAVENOUS ONCE
Status: COMPLETED | OUTPATIENT
Start: 2024-09-04 | End: 2024-09-04

## 2024-09-04 RX ORDER — POTASSIUM CHLORIDE 7.45 MG/ML
10 INJECTION INTRAVENOUS ONCE
Status: COMPLETED | OUTPATIENT
Start: 2024-09-04 | End: 2024-09-04

## 2024-09-04 RX ORDER — FOLIC ACID 1 MG/1
1 TABLET ORAL DAILY
Status: DISCONTINUED | OUTPATIENT
Start: 2024-09-04 | End: 2024-09-07 | Stop reason: HOSPADM

## 2024-09-04 RX ORDER — LORAZEPAM 1 MG/1
2 TABLET ORAL
Status: DISCONTINUED | OUTPATIENT
Start: 2024-09-04 | End: 2024-09-06

## 2024-09-04 RX ORDER — LORAZEPAM 1 MG/1
1 TABLET ORAL
Status: DISCONTINUED | OUTPATIENT
Start: 2024-09-04 | End: 2024-09-06

## 2024-09-04 RX ORDER — SODIUM CHLORIDE 0.9 % (FLUSH) 0.9 %
10 SYRINGE (ML) INJECTION AS NEEDED
Status: DISCONTINUED | OUTPATIENT
Start: 2024-09-04 | End: 2024-09-07 | Stop reason: HOSPADM

## 2024-09-04 RX ORDER — POTASSIUM CHLORIDE 750 MG/1
40 TABLET, FILM COATED, EXTENDED RELEASE ORAL ONCE
Status: COMPLETED | OUTPATIENT
Start: 2024-09-04 | End: 2024-09-04

## 2024-09-04 RX ORDER — DROPERIDOL 2.5 MG/ML
2.5 INJECTION, SOLUTION INTRAMUSCULAR; INTRAVENOUS ONCE
Status: COMPLETED | OUTPATIENT
Start: 2024-09-04 | End: 2024-09-04

## 2024-09-04 RX ORDER — POTASSIUM CHLORIDE 7.45 MG/ML
10 INJECTION INTRAVENOUS ONCE
Status: DISCONTINUED | OUTPATIENT
Start: 2024-09-04 | End: 2024-09-04

## 2024-09-04 RX ORDER — ONDANSETRON 2 MG/ML
8 INJECTION INTRAMUSCULAR; INTRAVENOUS ONCE
Status: COMPLETED | OUTPATIENT
Start: 2024-09-04 | End: 2024-09-04

## 2024-09-04 RX ORDER — SERTRALINE HYDROCHLORIDE 100 MG/1
100 TABLET, FILM COATED ORAL NIGHTLY
Status: DISCONTINUED | OUTPATIENT
Start: 2024-09-04 | End: 2024-09-07 | Stop reason: HOSPADM

## 2024-09-04 RX ORDER — AMOXICILLIN 250 MG
2 CAPSULE ORAL 2 TIMES DAILY PRN
Status: DISCONTINUED | OUTPATIENT
Start: 2024-09-04 | End: 2024-09-07 | Stop reason: HOSPADM

## 2024-09-04 RX ORDER — ARFORMOTEROL TARTRATE 15 UG/2ML
15 SOLUTION RESPIRATORY (INHALATION)
Status: DISCONTINUED | OUTPATIENT
Start: 2024-09-04 | End: 2024-09-07 | Stop reason: HOSPADM

## 2024-09-04 RX ORDER — SODIUM CHLORIDE 9 MG/ML
40 INJECTION, SOLUTION INTRAVENOUS AS NEEDED
Status: DISCONTINUED | OUTPATIENT
Start: 2024-09-04 | End: 2024-09-07 | Stop reason: HOSPADM

## 2024-09-04 RX ORDER — SODIUM CHLORIDE 0.9 % (FLUSH) 0.9 %
10 SYRINGE (ML) INJECTION EVERY 12 HOURS SCHEDULED
Status: DISCONTINUED | OUTPATIENT
Start: 2024-09-04 | End: 2024-09-07 | Stop reason: HOSPADM

## 2024-09-04 RX ORDER — BISACODYL 5 MG/1
5 TABLET, DELAYED RELEASE ORAL DAILY PRN
Status: DISCONTINUED | OUTPATIENT
Start: 2024-09-04 | End: 2024-09-07 | Stop reason: HOSPADM

## 2024-09-04 RX ORDER — LORAZEPAM 2 MG/ML
1 INJECTION INTRAMUSCULAR
Status: DISCONTINUED | OUTPATIENT
Start: 2024-09-04 | End: 2024-09-06

## 2024-09-04 RX ORDER — SPIRONOLACTONE 25 MG/1
25 TABLET ORAL DAILY
Status: DISCONTINUED | OUTPATIENT
Start: 2024-09-04 | End: 2024-09-04

## 2024-09-04 RX ORDER — BISACODYL 10 MG
10 SUPPOSITORY, RECTAL RECTAL DAILY PRN
Status: DISCONTINUED | OUTPATIENT
Start: 2024-09-04 | End: 2024-09-07 | Stop reason: HOSPADM

## 2024-09-04 RX ORDER — ATORVASTATIN CALCIUM 80 MG/1
80 TABLET, FILM COATED ORAL DAILY
Status: DISCONTINUED | OUTPATIENT
Start: 2024-09-04 | End: 2024-09-07 | Stop reason: HOSPADM

## 2024-09-04 RX ORDER — HYDROXYZINE HYDROCHLORIDE 25 MG/1
25 TABLET, FILM COATED ORAL ONCE AS NEEDED
Status: DISCONTINUED | OUTPATIENT
Start: 2024-09-04 | End: 2024-09-07 | Stop reason: HOSPADM

## 2024-09-04 RX ORDER — THIAMINE HYDROCHLORIDE 100 MG/ML
200 INJECTION, SOLUTION INTRAMUSCULAR; INTRAVENOUS EVERY 8 HOURS SCHEDULED
Status: DISCONTINUED | OUTPATIENT
Start: 2024-09-04 | End: 2024-09-06

## 2024-09-04 RX ORDER — BUSPIRONE HYDROCHLORIDE 15 MG/1
15 TABLET ORAL 2 TIMES DAILY PRN
Status: DISCONTINUED | OUTPATIENT
Start: 2024-09-04 | End: 2024-09-06

## 2024-09-04 RX ORDER — NITROGLYCERIN 0.4 MG/1
0.4 TABLET SUBLINGUAL
Status: DISCONTINUED | OUTPATIENT
Start: 2024-09-04 | End: 2024-09-07 | Stop reason: HOSPADM

## 2024-09-04 RX ADMIN — THIAMINE HYDROCHLORIDE 200 MG: 100 INJECTION, SOLUTION INTRAMUSCULAR; INTRAVENOUS at 21:14

## 2024-09-04 RX ADMIN — POTASSIUM CHLORIDE 10 MEQ: 7.46 INJECTION, SOLUTION INTRAVENOUS at 11:31

## 2024-09-04 RX ADMIN — POTASSIUM CHLORIDE 40 MEQ: 750 TABLET, EXTENDED RELEASE ORAL at 16:18

## 2024-09-04 RX ADMIN — THIAMINE HYDROCHLORIDE 200 MG: 100 INJECTION, SOLUTION INTRAMUSCULAR; INTRAVENOUS at 16:18

## 2024-09-04 RX ADMIN — Medication 10 ML: at 11:31

## 2024-09-04 RX ADMIN — POTASSIUM CHLORIDE 10 MEQ: 7.46 INJECTION, SOLUTION INTRAVENOUS at 08:56

## 2024-09-04 RX ADMIN — LORAZEPAM 2 MG: 1 TABLET ORAL at 07:48

## 2024-09-04 RX ADMIN — POTASSIUM CHLORIDE 40 MEQ: 750 TABLET, EXTENDED RELEASE ORAL at 05:55

## 2024-09-04 RX ADMIN — ARFORMOTEROL TARTRATE 15 MCG: 15 SOLUTION RESPIRATORY (INHALATION) at 20:29

## 2024-09-04 RX ADMIN — ATORVASTATIN CALCIUM 80 MG: 80 TABLET, FILM COATED ORAL at 11:30

## 2024-09-04 RX ADMIN — THIAMINE HYDROCHLORIDE 200 MG: 100 INJECTION, SOLUTION INTRAMUSCULAR; INTRAVENOUS at 10:18

## 2024-09-04 RX ADMIN — POTASSIUM CHLORIDE 10 MEQ: 7.46 INJECTION, SOLUTION INTRAVENOUS at 06:04

## 2024-09-04 RX ADMIN — MAGNESIUM SULFATE HEPTAHYDRATE 2 G: 40 INJECTION, SOLUTION INTRAVENOUS at 05:55

## 2024-09-04 RX ADMIN — POTASSIUM CHLORIDE 40 MEQ: 750 TABLET, EXTENDED RELEASE ORAL at 19:22

## 2024-09-04 RX ADMIN — METOPROLOL SUCCINATE 12.5 MG: 25 TABLET, EXTENDED RELEASE ORAL at 12:51

## 2024-09-04 RX ADMIN — FOLIC ACID 1 MG: 1 TABLET ORAL at 12:51

## 2024-09-04 RX ADMIN — Medication 10 ML: at 21:22

## 2024-09-04 RX ADMIN — ONDANSETRON 4 MG: 2 INJECTION, SOLUTION INTRAMUSCULAR; INTRAVENOUS at 04:55

## 2024-09-04 RX ADMIN — LORAZEPAM 2 MG: 2 INJECTION INTRAMUSCULAR; INTRAVENOUS at 22:38

## 2024-09-04 RX ADMIN — PANTOPRAZOLE SODIUM 40 MG: 40 TABLET, DELAYED RELEASE ORAL at 10:17

## 2024-09-04 RX ADMIN — DROPERIDOL 2.5 MG: 2.5 INJECTION, SOLUTION INTRAMUSCULAR; INTRAVENOUS at 05:52

## 2024-09-04 RX ADMIN — SERTRALINE 100 MG: 100 TABLET, FILM COATED ORAL at 21:14

## 2024-09-04 RX ADMIN — MAGNESIUM SULFATE HEPTAHYDRATE 2 G: 40 INJECTION, SOLUTION INTRAVENOUS at 10:39

## 2024-09-04 RX ADMIN — POTASSIUM CHLORIDE 40 MEQ: 750 TABLET, EXTENDED RELEASE ORAL at 22:01

## 2024-09-04 RX ADMIN — MAGNESIUM SULFATE HEPTAHYDRATE 2 G: 40 INJECTION, SOLUTION INTRAVENOUS at 08:28

## 2024-09-04 RX ADMIN — ASPIRIN 81 MG: 81 TABLET, COATED ORAL at 12:51

## 2024-09-04 RX ADMIN — PANTOPRAZOLE SODIUM 40 MG: 40 TABLET, DELAYED RELEASE ORAL at 16:21

## 2024-09-04 RX ADMIN — POTASSIUM CHLORIDE 10 MEQ: 7.46 INJECTION, SOLUTION INTRAVENOUS at 10:20

## 2024-09-04 RX ADMIN — LORAZEPAM 2 MG: 1 TABLET ORAL at 21:14

## 2024-09-04 RX ADMIN — POTASSIUM CHLORIDE 10 MEQ: 7.46 INJECTION, SOLUTION INTRAVENOUS at 07:26

## 2024-09-04 RX ADMIN — RIVAROXABAN 15 MG: 15 TABLET, FILM COATED ORAL at 19:23

## 2024-09-04 NOTE — ED PROVIDER NOTES
EMERGENCY DEPARTMENT ENCOUNTER  Room Number:  09/09  Date of encounter:  9/4/2024  PCP: Ginette Bryant MD  Patient Care Team:  Ginette Bryant MD as PCP - General (Family Medicine)     HPI:  Context: Javi Doran is a 64 y.o. male who presents to the ED c/o chief complaint of chest pain.  Patient complains of intermittent squeezing chest pain that began several hours ago.  Chest pain diffuse, greatest in sternum, does not radiate, is associated with shortness of breath, patient reports worse with exertion, has had nausea with gagging no emesis, reports to breakdown to sweat.  Patient reports he had been coughing prior to this, denies any fevers.    MEDICAL HISTORY REVIEW  Reviewed in EPIC    PAST MEDICAL HISTORY  Active Ambulatory Problems     Diagnosis Date Noted    Elevated alanine aminotransferase (ALT) level 01/21/2016    Anemia 01/21/2016    Anxiety 01/21/2016    Coronary arteriosclerosis in native artery 01/21/2016    Cobalamin deficiency 01/21/2016    Mass of breast 01/21/2016    Chronic kidney disease 01/21/2016    Acute on chronic systolic congestive heart failure 01/21/2016    Constipation 01/21/2016    Gastroesophageal reflux disease 01/21/2016    Fatigue 01/21/2016    Gastric ulcer 01/21/2016    Gynecomastia 01/21/2016    History of alcohol abuse 01/21/2016    Hyperlipidemia 01/21/2016    Hypogonadism in male 01/21/2016    Major depressive disorder, recurrent episode 01/21/2016    Primary insomnia 01/21/2016    Temporary cerebral vascular dysfunction 01/21/2016    Essential hypertension 01/21/2016    Benzodiazepine misuse 03/18/2016    EDMOND (acute kidney injury) 08/11/2024    Alcohol withdrawal syndrome without complication 08/11/2024     Resolved Ambulatory Problems     Diagnosis Date Noted    Hypokalemia 08/10/2024     Past Medical History:   Diagnosis Date    Alcoholism     Atrial fibrillation     Depression     Depression with anxiety     Obstructive  hypertrophic cardiomyopathy     Persistent insomnia     Solitary pulmonary nodule on lung CT        PAST SURGICAL HISTORY  Past Surgical History:   Procedure Laterality Date    ADENOIDECTOMY      CARDIAC SURGERY      HEMORRHOIDECTOMY      OTHER SURGICAL HISTORY      PTCA: DIAGNOSTIC CATH CORONARY DOMINANCE    OTHER SURGICAL HISTORY      TONSILLECTOMY WITH ADENOIDECTOMY    TONSILLECTOMY         FAMILY HISTORY  Family History   Problem Relation Age of Onset    Cardiomyopathy Brother        SOCIAL HISTORY  Social History     Socioeconomic History    Marital status: Single   Tobacco Use    Smoking status: Every Day     Current packs/day: 1.00     Types: Cigarettes   Vaping Use    Vaping status: Never Used   Substance and Sexual Activity    Alcohol use: Yes     Comment: former alcoholic. drinks occassionally.    Drug use: No    Sexual activity: Defer       ALLERGIES  Patient has no known allergies.    The patient's allergies have been reviewed    REVIEW OF SYSTEMS  All systems reviewed and negative except for those discussed in HPI.     PHYSICAL EXAM  I have reviewed the triage vital signs and nursing notes.  ED Triage Vitals [09/04/24 0438]   Temp Heart Rate Resp BP SpO2   97.2 °F (36.2 °C) 73 16 133/88 99 %      Temp src Heart Rate Source Patient Position BP Location FiO2 (%)   Tympanic Monitor -- -- --       General: No acute distress.  HENT: NCAT, PERRL, Nares patent.  Eyes: no scleral icterus.  Neck: trachea midline, no ROM limitations.  CV: regular rhythm, regular rate.  Respiratory: normal effort, CTAB.  Abdomen: soft, nondistended, NTTP, no rebound tenderness, no guarding or rigidity.  Musculoskeletal: no deformity.  Neuro: alert, moves all extremities, follows commands.  Skin: warm, dry.    LAB RESULTS  Recent Results (from the past 24 hour(s))   ECG 12 Lead Chest Pain    Collection Time: 09/04/24  4:50 AM   Result Value Ref Range    QT Interval 489 ms    QTC Interval 575 ms   Protime-INR    Collection Time:  09/04/24  4:59 AM    Specimen: Blood   Result Value Ref Range    Protime 16.1 (H) 11.7 - 14.2 Seconds    INR 1.27 (H) 0.90 - 1.10   aPTT    Collection Time: 09/04/24  4:59 AM    Specimen: Blood   Result Value Ref Range    PTT 31.3 22.7 - 35.4 seconds   Magnesium    Collection Time: 09/04/24  4:59 AM    Specimen: Blood   Result Value Ref Range    Magnesium 1.5 (L) 1.6 - 2.4 mg/dL   BNP    Collection Time: 09/04/24  4:59 AM    Specimen: Blood   Result Value Ref Range    proBNP 3,936.0 (H) 0.0 - 900.0 pg/mL   Comprehensive Metabolic Panel    Collection Time: 09/04/24  4:59 AM    Specimen: Blood   Result Value Ref Range    Glucose 109 (H) 65 - 99 mg/dL    BUN 6 (L) 8 - 23 mg/dL    Creatinine 1.36 (H) 0.76 - 1.27 mg/dL    Sodium 137 136 - 145 mmol/L    Potassium 2.3 (C) 3.5 - 5.2 mmol/L    Chloride 94 (L) 98 - 107 mmol/L    CO2 29.0 22.0 - 29.0 mmol/L    Calcium 8.6 8.6 - 10.5 mg/dL    Total Protein 6.9 6.0 - 8.5 g/dL    Albumin 4.1 3.5 - 5.2 g/dL    ALT (SGPT) 16 1 - 41 U/L    AST (SGOT) 29 1 - 40 U/L    Alkaline Phosphatase 193 (H) 39 - 117 U/L    Total Bilirubin 1.2 0.0 - 1.2 mg/dL    Globulin 2.8 gm/dL    A/G Ratio 1.5 g/dL    BUN/Creatinine Ratio 4.4 (L) 7.0 - 25.0    Anion Gap 14.0 5.0 - 15.0 mmol/L    eGFR 58.5 (L) >60.0 mL/min/1.73   High Sensitivity Troponin T    Collection Time: 09/04/24  4:59 AM    Specimen: Blood   Result Value Ref Range    HS Troponin T 52 (H) <22 ng/L   Green Top (Gel)    Collection Time: 09/04/24  4:59 AM   Result Value Ref Range    Extra Tube Hold for add-ons.    Lavender Top    Collection Time: 09/04/24  4:59 AM   Result Value Ref Range    Extra Tube hold for add-on    Gold Top - SST    Collection Time: 09/04/24  4:59 AM   Result Value Ref Range    Extra Tube Hold for add-ons.    Light Blue Top    Collection Time: 09/04/24  4:59 AM   Result Value Ref Range    Extra Tube Hold for add-ons.    CBC Auto Differential    Collection Time: 09/04/24  4:59 AM    Specimen: Blood   Result Value Ref  Range    WBC 5.81 3.40 - 10.80 10*3/mm3    RBC 3.95 (L) 4.14 - 5.80 10*6/mm3    Hemoglobin 10.6 (L) 13.0 - 17.7 g/dL    Hematocrit 32.7 (L) 37.5 - 51.0 %    MCV 82.8 79.0 - 97.0 fL    MCH 26.8 26.6 - 33.0 pg    MCHC 32.4 31.5 - 35.7 g/dL    RDW 18.3 (H) 12.3 - 15.4 %    RDW-SD 54.0 37.0 - 54.0 fl    MPV 8.3 6.0 - 12.0 fL    Platelets 197 140 - 450 10*3/mm3    Neutrophil % 69.3 42.7 - 76.0 %    Lymphocyte % 18.1 (L) 19.6 - 45.3 %    Monocyte % 10.7 5.0 - 12.0 %    Eosinophil % 0.7 0.3 - 6.2 %    Basophil % 0.7 0.0 - 1.5 %    Immature Grans % 0.5 0.0 - 0.5 %    Neutrophils, Absolute 4.03 1.70 - 7.00 10*3/mm3    Lymphocytes, Absolute 1.05 0.70 - 3.10 10*3/mm3    Monocytes, Absolute 0.62 0.10 - 0.90 10*3/mm3    Eosinophils, Absolute 0.04 0.00 - 0.40 10*3/mm3    Basophils, Absolute 0.04 0.00 - 0.20 10*3/mm3    Immature Grans, Absolute 0.03 0.00 - 0.05 10*3/mm3    nRBC 0.3 (H) 0.0 - 0.2 /100 WBC       I ordered the above labs and reviewed the results.    RADIOLOGY  XR Chest 1 View    Result Date: 9/4/2024  SINGLE VIEW OF THE CHEST  HISTORY: Chest pain and nausea  COMPARISON: August 17, 2024  FINDINGS: There is cardiomegaly. Patient is status post median sternotomy. Old left-sided rib fractures are seen. There may be some vascular congestion. No pneumothorax is seen. There is no definite vascular congestion. There is bibasilar consolidation. Small bilateral effusions are suspected.      Nonspecific bibasilar consolidation.  This report was finalized on 9/4/2024 5:26 AM by ESTRELLA LaddD on Workstation: [id number]       I ordered the above noted radiological studies. I reviewed the images and results. I agree with the radiologist interpretation.    PROCEDURES  Procedures    MEDICATIONS GIVEN IN ER  Medications   sodium chloride 0.9 % flush 10 mL (has no administration in time range)   hydrOXYzine (ATARAX) tablet 25 mg (has no administration in time range)   potassium chloride 10 mEq in 100 mL IVPB (10 mEq  Intravenous New Bag 9/4/24 0604)     And   potassium chloride 10 mEq in 100 mL IVPB (has no administration in time range)     And   potassium chloride 10 mEq in 100 mL IVPB (has no administration in time range)     And   potassium chloride 10 mEq in 100 mL IVPB (has no administration in time range)     And   potassium chloride 10 mEq in 100 mL IVPB (has no administration in time range)     And   potassium chloride 10 mEq in 100 mL IVPB (has no administration in time range)   magnesium sulfate 2g/50 mL (PREMIX) infusion (2 g Intravenous New Bag 9/4/24 0555)   ondansetron (ZOFRAN) injection 8 mg (4 mg Intravenous Given 9/4/24 0455)   potassium chloride (K-DUR,KLOR-CON) ER tablet 40 mEq (40 mEq Oral Given 9/4/24 0555)   droperidol (INAPSINE) injection 2.5 mg (2.5 mg Intravenous Given 9/4/24 0552)       PROGRESS, DATA ANALYSIS, CONSULTS, AND MEDICAL DECISION MAKING  A complete history and physical exam have been performed.  All available laboratory and imaging results have been reviewed by myself prior to disposition.    MDM    After the initial H&P, I discussed pertinent information from history and physical exam with patient/family.  Discussed differential diagnosis.  Discussed plan for ED evaluation/workup/treatment.  All questions answered.  Patient/family is agreeable with plan.  ED Course as of 09/04/24 0627   Wed Sep 04, 2024   0446 My differential diagnosis for chest pain includes but is not limited to:  Muscle strain, costochondritis, myositis, pleurisy, rib fracture, intercostal neuritis, herpes zoster, tumor, myocardial infarction, coronary syndrome, unstable angina, angina, aortic dissection, mitral valve prolapse, pericarditis, palpitations, pulmonary embolus, pneumonia, pneumothorax, lung cancer, GERD, esophagitis, esophageal spasm     [JG]   0453 EKG independently viewed and contemporaneously interpreted by ED physician. Time: 4:50 AM.  Rate 83.  Interpretation: Atrial fibrillation, normal axis, LVH, left  bundle branch block with appropriate discordant ST changes, Sgarbossa criteria negative.  Occasional unifocal PVCs, no contiguous PVCs. [JG]   0455 When compared with prior EKG on 8/19/2024, left bundle branch block is chronic in nature, patient was in normal sinus rhythm not atrial fibrillation at that time. [JG]   0455 Patient does have listed history of atrial fibrillation, is on anticoagulation. [JG]   0500 Patient requesting something for anxiety. [JG]   0543 Potassium 2.3.  Giving oral potassium, beginning K runs, patient also hypomagnesemic, giving IV magnesium. [JG]   0626 Phone call with Dr. Onofre [facility].  Discussed the patient, relevant history, exam, diagnostics, ED findings/progress, and concerns. They agree to admit the patient to telemetry observation under Dr. Everett. Care assumed by the admitting physician at this time.     [JG]      ED Course User Index  [JG] Raffi Londono MD       AS OF 06:27 EDT VITALS:    BP - 134/86  HR - 79  TEMP - 97.2 °F (36.2 °C) (Tympanic)  O2 SATS - 97%    DIAGNOSIS  Final diagnoses:   Chest pain, unspecified type   LBBB (left bundle branch block)   Elevated troponin   Hypokalemia   Hypomagnesemia   Chronic kidney disease, unspecified CKD stage   Anemia, unspecified type     Critical care:  Total critical care time of 30 minutes is exclusive of any other billable procedures and includes time spent with direct patient care and observation, retrospective chart review, management of acute condition, and consultation with other physicians.      DISPOSITION  ADMISSION    Discussed treatment plan and reason for admission with pt/family and admitting physician.  Pt/family voiced understanding of the plan for admission for further testing/treatment as needed.        Raffi Londono MD  09/04/24 3826

## 2024-09-04 NOTE — ED NOTES
"..Nursing report ED to floor  Javi Doran  64 y.o.  male    HPI :  HPI (Adult)  Stated Reason for Visit: chest pain  History Obtained From: patient, EMS    Chief Complaint  Chief Complaint   Patient presents with    Chest Pain       Admitting doctor:   Linda Everett MD    Admitting diagnosis:   The primary encounter diagnosis was Chest pain, unspecified type. Diagnoses of LBBB (left bundle branch block), Elevated troponin, Hypokalemia, Hypomagnesemia, Chronic kidney disease, unspecified CKD stage, and Anemia, unspecified type were also pertinent to this visit.    Code status:   Current Code Status       Date Active Code Status Order ID Comments User Context       9/4/2024 0715 CPR (Attempt to Resuscitate) [id number]  Linda Everett MD ED        Question Answer    Code Status (Patient has no pulse and is not breathing) CPR (Attempt to Resuscitate)    Medical Interventions (Patient has pulse or is breathing) Full Support    Level Of Support Discussed With Patient                    Allergies:   Patient has no known allergies.    Isolation:   No active isolations    Intake and Output    Intake/Output Summary (Last 24 hours) at 9/4/2024 0836  Last data filed at 9/4/2024 0806  Gross per 24 hour   Intake 750 ml   Output --   Net 750 ml       Weight:       09/04/24  0438   Weight: 77.1 kg (170 lb)       Most recent vitals:   Vitals:    09/04/24 0438 09/04/24 0546 09/04/24 0752   BP: 133/88 134/86 133/81   Pulse: 73 79 79   Resp: 16     Temp: 97.2 °F (36.2 °C)     TempSrc: Tympanic     SpO2: 99% 97% 97%   Weight: 77.1 kg (170 lb)     Height: 182.9 cm (72\")         Active LDAs/IV Access:   Lines, Drains & Airways       Active LDAs       Name Placement date Placement time Site Days    Peripheral IV 09/04/24 0759 Anterior;Proximal;Right Forearm 09/04/24  0759  Forearm  less than 1    Peripheral IV 09/04/24 0700 Anterior;Distal;Right Forearm 09/04/24  0700  Forearm  less than 1                    Labs (abnormal " labs have a star):   Labs Reviewed   PROTIME-INR - Abnormal; Notable for the following components:       Result Value    Protime 16.1 (*)     INR 1.27 (*)     All other components within normal limits   MAGNESIUM - Abnormal; Notable for the following components:    Magnesium 1.5 (*)     All other components within normal limits   BNP (IN-HOUSE) - Abnormal; Notable for the following components:    proBNP 3,936.0 (*)     All other components within normal limits    Narrative:     This assay is used as an aid in the diagnosis of individuals suspected of having heart failure. It can be used as an aid in the diagnosis of acute decompensated heart failure (ADHF) in patients presenting with signs and symptoms of ADHF to the emergency department (ED). In addition, NT-proBNP of <300 pg/mL indicates ADHF is not likely.    Age Range Result Interpretation  NT-proBNP Concentration (pg/mL:      <50             Positive            >450                   Gray                 300-450                    Negative             <300    50-75           Positive            >900                  Gray                300-900                  Negative            <300      >75             Positive            >1800                  Gray                300-1800                  Negative            <300   COMPREHENSIVE METABOLIC PANEL - Abnormal; Notable for the following components:    Glucose 109 (*)     BUN 6 (*)     Creatinine 1.36 (*)     Potassium 2.3 (*)     Chloride 94 (*)     Alkaline Phosphatase 193 (*)     BUN/Creatinine Ratio 4.4 (*)     eGFR 58.5 (*)     All other components within normal limits    Narrative:     GFR Normal >60  Chronic Kidney Disease <60  Kidney Failure <15     TROPONIN - Abnormal; Notable for the following components:    HS Troponin T 52 (*)     All other components within normal limits    Narrative:     High Sensitive Troponin T Reference Range:  <14.0 ng/L- Negative Female for AMI  <22.0 ng/L- Negative Male for  AMI  >=14 - Abnormal Female indicating possible myocardial injury.  >=22 - Abnormal Male indicating possible myocardial injury.   Clinicians would have to utilize clinical acumen, EKG, Troponin, and serial changes to determine if it is an Acute Myocardial Infarction or myocardial injury due to an underlying chronic condition.        CBC WITH AUTO DIFFERENTIAL - Abnormal; Notable for the following components:    RBC 3.95 (*)     Hemoglobin 10.6 (*)     Hematocrit 32.7 (*)     RDW 18.3 (*)     Lymphocyte % 18.1 (*)     nRBC 0.3 (*)     All other components within normal limits   HIGH SENSITIVITIY TROPONIN T 2HR - Abnormal; Notable for the following components:    HS Troponin T 45 (*)     Troponin T Delta -7 (*)     All other components within normal limits    Narrative:     High Sensitive Troponin T Reference Range:  <14.0 ng/L- Negative Female for AMI  <22.0 ng/L- Negative Male for AMI  >=14 - Abnormal Female indicating possible myocardial injury.  >=22 - Abnormal Male indicating possible myocardial injury.   Clinicians would have to utilize clinical acumen, EKG, Troponin, and serial changes to determine if it is an Acute Myocardial Infarction or myocardial injury due to an underlying chronic condition.        RESPIRATORY PANEL PCR W/ COVID-19 (SARS-COV-2), NP SWAB IN UTM/VTP, 2 HR TAT - Normal    Narrative:     In the setting of a positive respiratory panel with a viral infection PLUS a negative procalcitonin without other underlying concern for bacterial infection, consider observing off antibiotics or discontinuation of antibiotics and continue supportive care. If the respiratory panel is positive for atypical bacterial infection (Bordetella pertussis, Chlamydophila pneumoniae, or Mycoplasma pneumoniae), consider antibiotic de-escalation to target atypical bacterial infection.   APTT - Normal   RAINBOW DRAW    Narrative:     The following orders were created for panel order [place] Draw.  Procedure                                Abnormality         Status                     ---------                               -----------         ------                     Green Top (Gel)[[id number]]                                  Final result               Lavender Top[[id number]]                                     Final result               Gold Top - SST[[id number]]                                   Final result               Light Blue Top[[id number]]                                   Final result                 Please view results for these tests on the individual orders.   CBC AND DIFFERENTIAL    Narrative:     The following orders were created for panel order CBC & Differential.  Procedure                               Abnormality         Status                     ---------                               -----------         ------                     CBC Auto Differential[[id number]]        Abnormal            Final result                 Please view results for these tests on the individual orders.   GREEN TOP   LAVENDER TOP   GOLD TOP - SST   LIGHT BLUE TOP       EKG:   ECG 12 Lead Chest Pain   Preliminary Result   HEART RATE=83  bpm   RR Nuihbuzv=173  ms   CT Interval=  ms   P Horizontal Axis=  deg   P Front Axis=  deg   QRSD Nkuohzjz=049  ms   QT Mznzvxtm=753  ms   VKwO=169  ms   QRS Axis=82  deg   T Wave Axis=221  deg   - ABNORMAL ECG -   Atrial fibrillation   LVH with secondary repolarization abnormality   ST depr, consider ischemia, inferior leads   Prolonged QT interval   Baseline wander in lead(s) III   Date and Time of Study:2024-09-04 04:50:55          Meds given in ED:   Medications   sodium chloride 0.9 % flush 10 mL (has no administration in time range)   hydrOXYzine (ATARAX) tablet 25 mg (has no administration in time range)   potassium chloride 10 mEq in 100 mL IVPB (0 mEq Intravenous Stopped 9/4/24 0529)     And   potassium chloride 10 mEq in 100 mL IVPB (10 mEq Intravenous New Bag 9/4/24 9426)     And   potassium  chloride 10 mEq in 100 mL IVPB (has no administration in time range)     And   potassium chloride 10 mEq in 100 mL IVPB (has no administration in time range)     And   potassium chloride 10 mEq in 100 mL IVPB (has no administration in time range)     And   potassium chloride 10 mEq in 100 mL IVPB (has no administration in time range)   Potassium Replacement - Follow Nurse / BPA Driven Protocol (has no administration in time range)   Magnesium Standard Dose Replacement - Follow Nurse / BPA Driven Protocol (has no administration in time range)   Phosphorus Replacement - Follow Nurse / BPA Driven Protocol (has no administration in time range)   Calcium Replacement - Follow Nurse / BPA Driven Protocol (has no administration in time range)   atorvastatin (LIPITOR) tablet 80 mg (has no administration in time range)   pantoprazole (PROTONIX) EC tablet 40 mg (has no administration in time range)   sodium chloride 0.9 % flush 10 mL (has no administration in time range)   sodium chloride 0.9 % flush 10 mL (has no administration in time range)   sodium chloride 0.9 % infusion 40 mL (has no administration in time range)   sennosides-docusate (PERICOLACE) 8.6-50 MG per tablet 2 tablet (has no administration in time range)     And   polyethylene glycol (MIRALAX) packet 17 g (has no administration in time range)     And   bisacodyl (DULCOLAX) EC tablet 5 mg (has no administration in time range)     And   bisacodyl (DULCOLAX) suppository 10 mg (has no administration in time range)   nitroglycerin (NITROSTAT) SL tablet 0.4 mg (has no administration in time range)   busPIRone (BUSPAR) tablet 15 mg (has no administration in time range)   metoprolol succinate XL (TOPROL-XL) 24 hr tablet 12.5 mg (has no administration in time range)   rivaroxaban (XARELTO) tablet 15 mg (has no administration in time range)   sertraline (ZOLOFT) tablet 100 mg (has no administration in time range)   spironolactone (ALDACTONE) tablet 25 mg (has no  administration in time range)   arformoterol (BROVANA) nebulizer solution 15 mcg (has no administration in time range)     And   tiotropium (SPIRIVA RESPIMAT) 2.5 mcg/act aerosol solution inhaler (has no administration in time range)   LORazepam (ATIVAN) tablet 1 mg ( Oral Not Given:  See Alt 9/4/24 0748)     Or   LORazepam (ATIVAN) injection 1 mg ( Intravenous Not Given:  See Alt 9/4/24 0748)     Or   LORazepam (ATIVAN) tablet 2 mg (2 mg Oral Given 9/4/24 0748)     Or   LORazepam (ATIVAN) injection 2 mg ( Intravenous Not Given:  See Alt 9/4/24 0748)     Or   LORazepam (ATIVAN) injection 2 mg ( Intravenous Not Given:  See Alt 9/4/24 0748)     Or   LORazepam (ATIVAN) injection 2 mg ( Intramuscular Not Given:  See Alt 9/4/24 0748)   folic acid (FOLVITE) tablet 1 mg (has no administration in time range)   thiamine (B-1) injection 200 mg (has no administration in time range)     Followed by   thiamine (VITAMIN B-1) tablet 100 mg (has no administration in time range)   magnesium sulfate 2g/50 mL (PREMIX) infusion (2 g Intravenous New Bag 9/4/24 0828)   ondansetron (ZOFRAN) injection 8 mg (4 mg Intravenous Given 9/4/24 0455)   potassium chloride (K-DUR,KLOR-CON) ER tablet 40 mEq (40 mEq Oral Given 9/4/24 0555)   magnesium sulfate 2g/50 mL (PREMIX) infusion (0 g Intravenous Stopped 9/4/24 0806)   droperidol (INAPSINE) injection 2.5 mg (2.5 mg Intravenous Given 9/4/24 0552)       Imaging results:  XR Chest 1 View    Result Date: 9/4/2024  Nonspecific bibasilar consolidation.  This report was finalized on 9/4/2024 5:26 AM by Dr. Kyung Garcia M.D on Workstation: [id number]       Ambulatory status:   - up with assistance, stand by, pt has multiple IV's going at this time    Social issues:   Social History     Socioeconomic History    Marital status: Single   Tobacco Use    Smoking status: Every Day     Current packs/day: 1.00     Types: Cigarettes   Vaping Use    Vaping status: Never Used   Substance and Sexual Activity     Alcohol use: Yes     Comment: former alcoholic. drinks occassionally.    Drug use: No    Sexual activity: Defer       Peripheral Neurovascular  Peripheral Neurovascular (Adult)  Peripheral Neurovascular WDL: WDL    Neuro Cognitive  Neuro Cognitive (Adult)  Cognitive/Neuro/Behavioral WDL: WDL    Learning  Learning Assessment (Adult)  Learning Readiness and Ability: no barriers identified    Respiratory  Respiratory (Adult)  Airway WDL: WDL  Respiratory WDL  Respiratory WDL: .WDL except, rhythm/pattern  Rhythm/Pattern, Respiratory: shallow    Abdominal Pain       Pain Assessments  Pain (Adult)  (0-10) Pain Rating: Rest: 6  Pain Location: chest    NIH Stroke Scale       Sunni Mosher RN  09/04/24 08:36 EDT

## 2024-09-04 NOTE — PROGRESS NOTES
Clinical Pharmacy Services: Medication History    Javi Doran is a 64 y.o. male presenting to [facility] for   Chief Complaint   Patient presents with    Chest Pain       He  has a past medical history of Alcoholism, Atrial fibrillation, Constipation, Depression, Depression with anxiety, Obstructive hypertrophic cardiomyopathy, Persistent insomnia, and Solitary pulmonary nodule on lung CT.    Allergies as of 09/04/2024    (No Known Allergies)       Medication information was obtained from: Patient   Pharmacy and Phone Number:     Prior to Admission Medications       Prescriptions Last Dose Informant Patient Reported? Taking?    albuterol sulfate  (90 Base) MCG/ACT inhaler  Pharmacy Yes Yes    Inhale 2 puffs Every 4 (Four) Hours As Needed for Wheezing.    atorvastatin (LIPITOR) 80 MG tablet  Pharmacy, Self Yes Yes    Take 1 tablet by mouth Every Night.    busPIRone (BUSPAR) 15 MG tablet  Self, Pharmacy Yes Yes    Take 1 tablet by mouth 2 (Two) Times a Day As Needed (anxiety). Has not used in the past 6 months but has if needed    furosemide (LASIX) 20 MG tablet  Pharmacy, Self Yes Yes    Take 2 tablets by mouth 2 (Two) Times a Day.    hydrOXYzine pamoate (VISTARIL) 25 MG capsule   No Yes    Take 1 capsule by mouth 3 (Three) Times a Day As Needed for Anxiety for up to 30 days.    metoprolol succinate XL (TOPROL-XL) 25 MG 24 hr tablet  Pharmacy No Yes    Take 0.5 tablets by mouth Daily for 30 days.    pantoprazole (PROTONIX) 40 MG EC tablet  Pharmacy No Yes    Take 1 tablet by mouth 2 (Two) Times a Day Before Meals for 30 days.    potassium chloride 10 MEQ CR tablet  Self Yes Yes    Take 2 tablets by mouth 2 (Two) Times a Day.    rivaroxaban (XARELTO) 15 MG tablet  Pharmacy, Self Yes Yes    Take 1 tablet by mouth Daily With Dinner.    sertraline (ZOLOFT) 100 MG tablet  Self Yes Yes    Take 1 tablet by mouth Every Night.    spironolactone (ALDACTONE) 25 MG tablet  Pharmacy, Self Yes Yes     Take 1 tablet by mouth Daily.    Umeclidinium-Vilanterol (Anoro Ellipta) 62.5-25 MCG/ACT aerosol powder  inhaler  Self Yes Yes    Inhale 1 puff Daily.              Medication notes:     This medication list is complete to the best of my knowledge as of 9/4/2024    Please call if questions.    Vito Templeton  Medication History Technician  [phone]    9/4/2024 08:47 EDT

## 2024-09-04 NOTE — ED TRIAGE NOTES
Pt to triage from home via [facility] ems [id number] with c/o chest pain that started earlier. Pt history of cardiomyopathy, given toradol, aspirin and zofran  per ems, which helped with pain, but that has started to come back.

## 2024-09-04 NOTE — CONSULTS
Date of Consultation: 24    Referral Provider: Linda Everett MD     Reason for Consultation: Chest pain    Encounter Provider: Nishi Anguiano RN    Group of Service: [facility]     Patient Name: Javi Doran    :1960    Chief complaint:      History of Present Illness:      This is a 64-year-old male with a past medical history notable for alcohol use, hypertension, chronic kidney disease, coronary artery disease, heart failure/hypertrophic cardiomyopathy, and anemia.  He has multiple admissions, and is usually seen at [place].    He follows with [facility].  He had a prior cardioversion for AF/AFL with recurrence and had bradycardia after amiodarone initiation.  It was discontinued.  He has a history of coronary artery disease and had a cardiac catheterization done in 2023.  This showed 50 to 60% LAD stenosis and patent stents in other arteries.  FFR was negative and medical therapy was recommended.      He was admitted from 8/10/2024 through 2024 for a CHF exacerbation.  He was actually discharged from [facility] just before for treatment of alcoholic ketoacidosis.  He reported dyspnea.  2D echo during this hospital stay revealed ejection fraction of 54%.  Diuretics were initiated and he did not require any supplemental oxygen. It was felt that some component of his dyspnea was felt to be secondary to anxiety.    He presented to the emergency department today complaining of chest pain.  He reports it as being and intermittent squeezing sensation that began several hours ago.  It does not radiate, but is associated with shortness of breath.  It is worse with exertion.  He also endorses nausea with dry heaving, but no emesis.  He does report decreased p.o. intake.  He does have a history of alcohol abuse and drinks approximately 1 pint of alcohol every 3 days, usually vodka. In the emergency room his heart rate was 73, blood pressure 133/88.  EKG reads atrial  fibrillation with left bundle branch block. Other workup included proBNP 3936, high-sensitivity troponin 52, creatinine 1.36, magnesium 1.5.  Chest x-ray with nonspecific bibasilar consolidation.  He has been admitted for further management.    Cardiology has been asked to see this patient for chest pain.    He is somnolent and falling asleep intermittently on history. He denied current chest pain and stated he felt better. He has chronic sob that overall is unchanged. No edema. He is lying flat comfortably.         ECHO 8/15/2024    Limited echocardiogram to assess LV function.    Left ventricular systolic function is normal. Calculated left ventricular EF = 54.4%    Mitral valve is not fully visualized or assessed but there appears to be moderate mitral regurgitation.       Past Medical History:   Diagnosis Date    Alcoholism     Atrial fibrillation     Constipation     Depression     Depression with anxiety     Obstructive hypertrophic cardiomyopathy     Persistent insomnia     Solitary pulmonary nodule on lung CT          Past Surgical History:   Procedure Laterality Date    ADENOIDECTOMY      CARDIAC SURGERY      HEMORRHOIDECTOMY      OTHER SURGICAL HISTORY      PTCA: DIAGNOSTIC CATH CORONARY DOMINANCE    OTHER SURGICAL HISTORY      TONSILLECTOMY WITH ADENOIDECTOMY    TONSILLECTOMY           No Known Allergies      No current facility-administered medications on file prior to encounter.     Current Outpatient Medications on File Prior to Encounter   Medication Sig Dispense Refill    albuterol sulfate  (90 Base) MCG/ACT inhaler Inhale 2 puffs Every 4 (Four) Hours As Needed for Wheezing.      atorvastatin (LIPITOR) 80 MG tablet Take 1 tablet by mouth Every Night.      busPIRone (BUSPAR) 15 MG tablet Take 1 tablet by mouth 2 (Two) Times a Day As Needed (anxiety). Has not used in the past 6 months but has if needed      furosemide (LASIX) 20 MG tablet Take 2 tablets by mouth 2 (Two) Times a Day.       "hydrOXYzine pamoate (VISTARIL) 25 MG capsule Take 1 capsule by mouth 3 (Three) Times a Day As Needed for Anxiety for up to 30 days. 90 capsule 0    metoprolol succinate XL (TOPROL-XL) 25 MG 24 hr tablet Take 0.5 tablets by mouth Daily for 30 days. 15 tablet 0    pantoprazole (PROTONIX) 40 MG EC tablet Take 1 tablet by mouth 2 (Two) Times a Day Before Meals for 30 days. 60 tablet 0    potassium chloride 10 MEQ CR tablet Take 2 tablets by mouth 2 (Two) Times a Day.      rivaroxaban (XARELTO) 15 MG tablet Take 1 tablet by mouth Daily With Dinner.      sertraline (ZOLOFT) 100 MG tablet Take 1 tablet by mouth Every Night.      spironolactone (ALDACTONE) 25 MG tablet Take 1 tablet by mouth Daily.      Umeclidinium-Vilanterol (Anoro Ellipta) 62.5-25 MCG/ACT aerosol powder  inhaler Inhale 1 puff Daily.           Social History     Socioeconomic History    Marital status: Single   Tobacco Use    Smoking status: Every Day     Current packs/day: 1.00     Types: Cigarettes   Vaping Use    Vaping status: Never Used   Substance and Sexual Activity    Alcohol use: Yes     Comment: former alcoholic. drinks occassionally.    Drug use: No    Sexual activity: Defer         Family History   Problem Relation Age of Onset    Cardiomyopathy Brother          Objective:     Vitals:    09/04/24 0851 09/04/24 0955 09/04/24 1030 09/04/24 1113   BP: 120/89 127/93  117/61   BP Location:    Left arm   Patient Position:    Lying   Pulse: 84 76 77 73   Resp:   18 20   Temp:    98.6 °F (37 °C)   TempSrc:    Oral   SpO2: 96% 96% 97%    Weight:       Height:         Body mass index is 23.06 kg/m².  Flowsheet Rows      Flowsheet Row First Filed Value   Admission Height 182.9 cm (72\") Documented at 09/04/2024 0438   Admission Weight 77.1 kg (170 lb) Documented at 09/04/2024 0438             General:    No acute distress, alert and oriented x4, pleasant                   Head:    Normocephalic, atraumatic.   Eyes:          Conjunctivae and sclerae " normal, no icterus, PERRLA   Throat:   No oral lesions, no thrush, oral mucosa moist.    Neck:   Supple, trachea midline.   Lungs:     Clear to auscultation bilaterally     Heart:    Regular rhythm and normal rate. 2/6 systolic murmur at lUSB, gallops, or rubs noted.   Abdomen:     Soft, non-tender, non-distended, positive bowel sounds.    Extremities:   No clubbing, cyanosis, or edema.     Pulses:   Pulses palpable and equal bilaterally.    Skin:   No bleeding or rash.   Neuro:   Non-focal.  Moves all extremities well.    Psychiatric:   Normal mood and affect.                 Lab Review:                Results from last 7 days   Lab Units 09/04/24  0459   SODIUM mmol/L 137   POTASSIUM mmol/L 2.3*   CHLORIDE mmol/L 94*   CO2 mmol/L 29.0   BUN mg/dL 6*   CREATININE mg/dL 1.36*   GLUCOSE mg/dL 109*   CALCIUM mg/dL 8.6     Results from last 7 days   Lab Units 09/04/24  0759 09/04/24  0459   HSTROP T ng/L 45* 52*     Results from last 7 days   Lab Units 09/04/24  0459   WBC 10*3/mm3 5.81   HEMOGLOBIN g/dL 10.6*   HEMATOCRIT % 32.7*   PLATELETS 10*3/mm3 197     Results from last 7 days   Lab Units 09/04/24  0459   INR  1.27*   APTT seconds 31.3         Results from last 7 days   Lab Units 09/04/24  0459   MAGNESIUM mg/dL 1.5*           EKG (reviewed by me personally):            Assessment/plan    64-year-old male with a past medical history notable for alcohol use, hypertension, chronic kidney disease, coronary artery disease, heart failure/hypertrophic cardiomyopathy, and anemia. Cardiology consulted for cp. Patient is pain free at this time and describes atypical pain. Trop 50 and downtrending, similar to prior admissions. BNP elevated but down significantly from prior admission and he is grossly euvolemic on exam. Hypokalemic to 2.3, being repleted.    Chronic systolic heart failure   Hx of hypertrophic CM s/p myectomy and MVR  Moderate MR   Hx of paroxysmal atrial fibrillation w/ hx of MAZE prozedure  Hx of LBBB w/  prolonged Qtc  Nonobstructive CAD on [facility] 2023   Alcohol use disorder   -repeat TTE to reassess MR, not fully assessed on limited study  -K 2.3, being repleted. Hold diuretics at this time.   -continue ASA 81mg, atorvastatin 80mg, metoprolol 12.5mg   -spironolactone held in setting of hypotension  -continue xarelto       Thank you for the consult. We will continue to follow

## 2024-09-04 NOTE — H&P
Patient Name:  Javi Doran  YOB: 1960  MRN:  [id number]  Admit Date:  9/4/2024  Patient Care Team:  Ginette Bryant MD as PCP - General (Family Medicine)      Subjective   History Present Illness     Chief Complaint   Patient presents with    Chest Pain           History of Present Illness  Patient is a 64-year-old male with a history of including but not limited to alcohol use disorder, hypertension, CKD, CAD, heart failure/hypertrophic cardiomyopathy, anemia presents to the ED complaining of chest pain.Describes chest pain as diffuse, does not radiate to the neck or arm.  Primarily located in sternal region.  Does have shortness of breath with exertion.  Denies fevers or chills.  No nausea, vomiting or diarrhea.  Patient was found to have severe hypokalemia, potassium was 2.3 on admission.  On Lasix 20 mg twice daily which reports he has been taking as prescribed.  Reports decreased p.o. intake.  Does have history of alcohol use reports he has been trying to cut down, drinks approximately every 3, 1 pint of alcohol, usually vodka.         Review of Systems   GENERAL: No fevers, chills, sweats.    RESPIRATORY: No cough, +shortness of breath, hemoptysis or wheezing.   CVS: +chest pain, palpitations, orthopnea, dyspnea on exertion   GI: No melena or hematochezia. No abdominal pain. No nausea, vomiting, constipation, diarrhea    Personal History     Past Medical History:   Diagnosis Date    Alcoholism     Atrial fibrillation     Constipation     Depression     Depression with anxiety     Obstructive hypertrophic cardiomyopathy     Persistent insomnia     Solitary pulmonary nodule on lung CT      Past Surgical History:   Procedure Laterality Date    ADENOIDECTOMY      CARDIAC SURGERY      HEMORRHOIDECTOMY      OTHER SURGICAL HISTORY      PTCA: DIAGNOSTIC CATH CORONARY DOMINANCE    OTHER SURGICAL HISTORY      TONSILLECTOMY WITH ADENOIDECTOMY    TONSILLECTOMY       Family  History   Problem Relation Age of Onset    Cardiomyopathy Brother      Social History     Tobacco Use    Smoking status: Every Day     Current packs/day: 1.00     Types: Cigarettes   Vaping Use    Vaping status: Never Used   Substance Use Topics    Alcohol use: Yes     Comment: former alcoholic. drinks occassionally.    Drug use: No     No current facility-administered medications on file prior to encounter.     Current Outpatient Medications on File Prior to Encounter   Medication Sig Dispense Refill    albuterol sulfate  (90 Base) MCG/ACT inhaler Inhale 2 puffs Every 4 (Four) Hours As Needed for Wheezing.      atorvastatin (LIPITOR) 80 MG tablet Take 1 tablet by mouth Every Night.      busPIRone (BUSPAR) 15 MG tablet Take 1 tablet by mouth 2 (Two) Times a Day As Needed (anxiety). Has not used in the past 6 months but has if needed      furosemide (LASIX) 20 MG tablet Take 2 tablets by mouth 2 (Two) Times a Day.      hydrOXYzine pamoate (VISTARIL) 25 MG capsule Take 1 capsule by mouth 3 (Three) Times a Day As Needed for Anxiety for up to 30 days. 90 capsule 0    metoprolol succinate XL (TOPROL-XL) 25 MG 24 hr tablet Take 0.5 tablets by mouth Daily for 30 days. 15 tablet 0    pantoprazole (PROTONIX) 40 MG EC tablet Take 1 tablet by mouth 2 (Two) Times a Day Before Meals for 30 days. 60 tablet 0    potassium chloride 10 MEQ CR tablet Take 2 tablets by mouth 2 (Two) Times a Day.      rivaroxaban (XARELTO) 15 MG tablet Take 1 tablet by mouth Daily With Dinner.      sertraline (ZOLOFT) 100 MG tablet Take 1 tablet by mouth Every Night.      spironolactone (ALDACTONE) 25 MG tablet Take 1 tablet by mouth Daily.      Umeclidinium-Vilanterol (Anoro Ellipta) 62.5-25 MCG/ACT aerosol powder  inhaler Inhale 1 puff Daily.       No Known Allergies    Objective    Objective     Vital Signs  Temp:  [97.2 °F (36.2 °C)-98.6 °F (37 °C)] 98.6 °F (37 °C)  Heart Rate:  [73-84] 73  Resp:  [16-20] 20  BP: (117-134)/(61-93)  117/61  SpO2:  [96 %-99 %] 97 %  on  Flow (L/min):  [2] 2;   Device (Oxygen Therapy): room air  Body mass index is 23.06 kg/m².    Physical Exam  General: Alert and oriented x3, no acute distress.  HEENT: Normocephalic, atraumatic  Eyes: PERRL, EOMI, anicteric sclerae  Lungs: Clear to auscultation bilaterally, no crackles or wheezes  CV: Regular rate and rhythm, no murmurs rubs or gallops  Abdomen: Soft, nontender, nondistended.  Normoactive bowel sounds  Extremities: No significant peripheral edema  Skin: Clean/dry/intact, no rashes  Neuro: Cranial nerves II through XII intact, no gross focal neurological deficits appreciated  Psych: Appropriate mood and affect    Results Review:  I reviewed the patient's new clinical results.  I reviewed the patient's new imaging results and agree with the interpretation.  I reviewed the patient's other test results and agree with the interpretation  I personally viewed and interpreted the patient's EKG/Telemetry data  Discussed with ED provider.    Lab Results (last 24 hours)       Procedure Component Value Units Date/Time    Protime-INR [[id number]]  (Abnormal) Collected: 09/04/24 0459    Specimen: Blood Updated: 09/04/24 0522     Protime 16.1 Seconds      INR 1.27    aPTT [[id number]]  (Normal) Collected: 09/04/24 0459    Specimen: Blood Updated: 09/04/24 0522     PTT 31.3 seconds     Magnesium [[id number]]  (Abnormal) Collected: 09/04/24 0459    Specimen: Blood Updated: 09/04/24 0542     Magnesium 1.5 mg/dL     BNP [[id number]]  (Abnormal) Collected: 09/04/24 0459    Specimen: Blood Updated: 09/04/24 0541     proBNP 3,936.0 pg/mL     Narrative:      This assay is used as an aid in the diagnosis of individuals suspected of having heart failure. It can be used as an aid in the diagnosis of acute decompensated heart failure (ADHF) in patients presenting with signs and symptoms of ADHF to the emergency department (ED). In addition, NT-proBNP of <300 pg/mL indicates ADHF is not  likely.    Age Range Result Interpretation  NT-proBNP Concentration (pg/mL:      <50             Positive            >450                   Gray                 300-450                    Negative             <300    50-75           Positive            >900                  Gray                300-900                  Negative            <300      >75             Positive            >1800                  Gray                300-1800                  Negative            <300    CBC & Differential [[id number]]  (Abnormal) Collected: 09/04/24 0459    Specimen: Blood Updated: 09/04/24 0509    Narrative:      The following orders were created for panel order CBC & Differential.  Procedure                               Abnormality         Status                     ---------                               -----------         ------                     CBC Auto Differential[[id number]]        Abnormal            Final result                 Please view results for these tests on the individual orders.    Comprehensive Metabolic Panel [[id number]]  (Abnormal) Collected: 09/04/24 0459    Specimen: Blood Updated: 09/04/24 0543     Glucose 109 mg/dL      BUN 6 mg/dL      Creatinine 1.36 mg/dL      Sodium 137 mmol/L      Potassium 2.3 mmol/L      Chloride 94 mmol/L      CO2 29.0 mmol/L      Calcium 8.6 mg/dL      Total Protein 6.9 g/dL      Albumin 4.1 g/dL      ALT (SGPT) 16 U/L      AST (SGOT) 29 U/L      Alkaline Phosphatase 193 U/L      Total Bilirubin 1.2 mg/dL      Globulin 2.8 gm/dL      A/G Ratio 1.5 g/dL      BUN/Creatinine Ratio 4.4     Anion Gap 14.0 mmol/L      eGFR 58.5 mL/min/1.73     Narrative:      GFR Normal >60  Chronic Kidney Disease <60  Kidney Failure <15      High Sensitivity Troponin T [[id number]]  (Abnormal) Collected: 09/04/24 0459    Specimen: Blood Updated: 09/04/24 0541     HS Troponin T 52 ng/L     Narrative:      High Sensitive Troponin T Reference Range:  <14.0 ng/L- Negative Female for AMI  <22.0  ng/L- Negative Male for AMI  >=14 - Abnormal Female indicating possible myocardial injury.  >=22 - Abnormal Male indicating possible myocardial injury.   Clinicians would have to utilize clinical acumen, EKG, Troponin, and serial changes to determine if it is an Acute Myocardial Infarction or myocardial injury due to an underlying chronic condition.         CBC Auto Differential [[id number]]  (Abnormal) Collected: 09/04/24 0459    Specimen: Blood Updated: 09/04/24 0509     WBC 5.81 10*3/mm3      RBC 3.95 10*6/mm3      Hemoglobin 10.6 g/dL      Hematocrit 32.7 %      MCV 82.8 fL      MCH 26.8 pg      MCHC 32.4 g/dL      RDW 18.3 %      RDW-SD 54.0 fl      MPV 8.3 fL      Platelets 197 10*3/mm3      Neutrophil % 69.3 %      Lymphocyte % 18.1 %      Monocyte % 10.7 %      Eosinophil % 0.7 %      Basophil % 0.7 %      Immature Grans % 0.5 %      Neutrophils, Absolute 4.03 10*3/mm3      Lymphocytes, Absolute 1.05 10*3/mm3      Monocytes, Absolute 0.62 10*3/mm3      Eosinophils, Absolute 0.04 10*3/mm3      Basophils, Absolute 0.04 10*3/mm3      Immature Grans, Absolute 0.03 10*3/mm3      nRBC 0.3 /100 WBC     Respiratory Panel PCR w/COVID-19(SARS-CoV-2) LUIS/LUCIANO/ALFONSO/PAD/COR/MELIZA In-House, NP Swab in UTM/VTM, 2 HR TAT - Swab, Nasopharynx [[id number]]  (Normal) Collected: 09/04/24 0547    Specimen: Swab from Nasopharynx Updated: 09/04/24 0703     ADENOVIRUS, PCR Not Detected     Coronavirus 229E Not Detected     Coronavirus HKU1 Not Detected     Coronavirus NL63 Not Detected     Coronavirus OC43 Not Detected     COVID19 Not Detected     Human Metapneumovirus Not Detected     Human Rhinovirus/Enterovirus Not Detected     Influenza A PCR Not Detected     Influenza B PCR Not Detected     Parainfluenza Virus 1 Not Detected     Parainfluenza Virus 2 Not Detected     Parainfluenza Virus 3 Not Detected     Parainfluenza Virus 4 Not Detected     RSV, PCR Not Detected     Bordetella pertussis pcr Not Detected     Bordetella  parapertussis PCR Not Detected     Chlamydophila pneumoniae PCR Not Detected     Mycoplasma pneumo by PCR Not Detected    Narrative:      In the setting of a positive respiratory panel with a viral infection PLUS a negative procalcitonin without other underlying concern for bacterial infection, consider observing off antibiotics or discontinuation of antibiotics and continue supportive care. If the respiratory panel is positive for atypical bacterial infection (Bordetella pertussis, Chlamydophila pneumoniae, or Mycoplasma pneumoniae), consider antibiotic de-escalation to target atypical bacterial infection.    High Sensitivity Troponin T 2Hr [[id number]]  (Abnormal) Collected: 09/04/24 0759    Specimen: Blood Updated: 09/04/24 0833     HS Troponin T 45 ng/L      Troponin T Delta -7 ng/L     Narrative:      High Sensitive Troponin T Reference Range:  <14.0 ng/L- Negative Female for AMI  <22.0 ng/L- Negative Male for AMI  >=14 - Abnormal Female indicating possible myocardial injury.  >=22 - Abnormal Male indicating possible myocardial injury.   Clinicians would have to utilize clinical acumen, EKG, Troponin, and serial changes to determine if it is an Acute Myocardial Infarction or myocardial injury due to an underlying chronic condition.                 Imaging Results (Last 24 Hours)       Procedure Component Value Units Date/Time    XR Chest 1 View [[id number]] Collected: 09/04/24 0524     Updated: 09/04/24 0529    Narrative:      SINGLE VIEW OF THE CHEST     HISTORY: Chest pain and nausea     COMPARISON: August 17, 2024     FINDINGS:  There is cardiomegaly. Patient is status post median sternotomy. Old  left-sided rib fractures are seen. There may be some vascular  congestion. No pneumothorax is seen. There is no definite vascular  congestion. There is bibasilar consolidation. Small bilateral effusions  are suspected.       Impression:      Nonspecific bibasilar consolidation.     This report was finalized on  9/4/2024 5:26 AM by Dr. Kyung Garcia M.D on Workstation: [id number]               Results for orders placed during the hospital encounter of 08/10/24    Adult Transthoracic Echo Limited W/ Cont if Necessary Per Protocol    Interpretation Summary    Limited echocardiogram to assess LV function.    Left ventricular systolic function is normal. Calculated left ventricular EF = 54.4%    Mitral valve is not fully visualized or assessed but there appears to be moderate mitral regurgitation.      ECG 12 Lead Chest Pain   Preliminary Result   HEART RATE=83  bpm   RR Tbrooqnj=683  ms   [place] Interval=  ms   P Horizontal Axis=  deg   P Front Axis=  deg   QRSD Rhathtvj=859  ms   QT Xznlxvzo=797  ms   LMuI=116  ms   QRS Axis=82  deg   T Wave Axis=221  deg   - ABNORMAL ECG -   Atrial fibrillation   LVH with secondary repolarization abnormality   ST depr, consider ischemia, inferior leads   Prolonged QT interval   Baseline wander in lead(s) III   Date and Time of Study:2024-09-04 04:50:55           Assessment/Plan     Active Hospital Problems    Diagnosis  POA    Hypokalemia [E87.6]  Yes    Essential hypertension [I10]  Yes    Gastroesophageal reflux disease [K21.9]  Yes    Chronic kidney disease [N18.9]  Yes      Resolved Hospital Problems   No resolved problems to display.     Patient is a 64-year-old male with a history of including but not limited to alcohol use disorder, hypertension, CKD, CAD, heart failure/hypertrophic cardiomyopathy, atrial fibrillation, anemia presents to the ED complaining of chest pain    Severe hypokalemia/hypomagnesemia  -Likely multifactorial secondary to Lasix, alcohol use disorder, decreased p.o. intake  -Potassium 2.3 on admission, magnesium 1.5  -Has been initiated on electrolyte response protocol in the ED  -Patient is on Lasix 20 mg twice daily outpatient, will hold until potassium normalizes  -Continue to monitor and replete per protocol    Chest pain  Atrial fibrillation  -chronic  diastolic heart failure  -Patient currently denies chest pain  -I think symptoms related secondary severe hypokalemia as above  -Troponin mildly elevated at 52, however similar to prior.  Repeat troponin 25.  -Initial EKG with prolonged QTc, tall T waves, possible A-fib.  Changes consistent sj hypokalemia, will repeat EKG to monitor changes after replacement of potassium  -Metoprolol, atorvastatin, rivaroxaban resumed  -Add aspirin pending cardiology workup  -Hold Lasix until stable hypokalemia-resolved as above    Alcohol use disorder  -Drinks 1 pint of vodka every 3 days or so  -Initiate CIWA protocol, multivitamins.  Access consult.            I discussed the patient's findings and my recommendations with patient   VTE Prophylaxis - SCDs.  Code Status - Full code.       Linda Everett MD  [facility]  09/04/24  12:23 EDT

## 2024-09-04 NOTE — ED NOTES
"Nursing report ED to floor  Javi Doran  64 y.o.  male    HPI :  HPI (Adult)  Stated Reason for Visit: chest pain  History Obtained From: patient, EMS    Chief Complaint  Chief Complaint   Patient presents with    Chest Pain       Admitting doctor:   Linda Everett MD    Admitting diagnosis:   The primary encounter diagnosis was Chest pain, unspecified type. Diagnoses of LBBB (left bundle branch block), Elevated troponin, Hypokalemia, Hypomagnesemia, Chronic kidney disease, unspecified CKD stage, and Anemia, unspecified type were also pertinent to this visit.    Code status:   Current Code Status       Date Active Code Status Order ID Comments User Context       9/4/2024 0715 CPR (Attempt to Resuscitate) [id number]  Linda Everett MD ED        Question Answer    Code Status (Patient has no pulse and is not breathing) CPR (Attempt to Resuscitate)    Medical Interventions (Patient has pulse or is breathing) Full Support    Level Of Support Discussed With Patient                    Allergies:   Patient has no known allergies.    Isolation:   No active isolations    Intake and Output    Intake/Output Summary (Last 24 hours) at 9/4/2024 0728  Last data filed at 9/4/2024 0436  Gross per 24 hour   Intake 600 ml   Output --   Net 600 ml       Weight:       09/04/24 0438   Weight: 77.1 kg (170 lb)       Most recent vitals:   Vitals:    09/04/24 0438 09/04/24 0546   BP: 133/88 134/86   Pulse: 73 79   Resp: 16    Temp: 97.2 °F (36.2 °C)    TempSrc: Tympanic    SpO2: 99% 97%   Weight: 77.1 kg (170 lb)    Height: 182.9 cm (72\")        Active LDAs/IV Access:   Lines, Drains & Airways       Active LDAs       Name Placement date Placement time Site Days    Peripheral IV 08/10/24 1142 Anterior;Right Forearm 08/10/24  1142  Forearm  24    Peripheral IV 09/04/24 0437 Left;Posterior Hand 09/04/24 0437  Hand  less than 1                    Labs (abnormal labs have a star):   Labs Reviewed   PROTIME-INR - Abnormal; " Notable for the following components:       Result Value    Protime 16.1 (*)     INR 1.27 (*)     All other components within normal limits   MAGNESIUM - Abnormal; Notable for the following components:    Magnesium 1.5 (*)     All other components within normal limits   BNP (IN-HOUSE) - Abnormal; Notable for the following components:    proBNP 3,936.0 (*)     All other components within normal limits    Narrative:     This assay is used as an aid in the diagnosis of individuals suspected of having heart failure. It can be used as an aid in the diagnosis of acute decompensated heart failure (ADHF) in patients presenting with signs and symptoms of ADHF to the emergency department (ED). In addition, NT-proBNP of <300 pg/mL indicates ADHF is not likely.    Age Range Result Interpretation  NT-proBNP Concentration (pg/mL:      <50             Positive            >450                   Gray                 300-450                    Negative             <300    50-75           Positive            >900                  Gray                300-900                  Negative            <300      >75             Positive            >1800                  Gray                300-1800                  Negative            <300   COMPREHENSIVE METABOLIC PANEL - Abnormal; Notable for the following components:    Glucose 109 (*)     BUN 6 (*)     Creatinine 1.36 (*)     Potassium 2.3 (*)     Chloride 94 (*)     Alkaline Phosphatase 193 (*)     BUN/Creatinine Ratio 4.4 (*)     eGFR 58.5 (*)     All other components within normal limits    Narrative:     GFR Normal >60  Chronic Kidney Disease <60  Kidney Failure <15     TROPONIN - Abnormal; Notable for the following components:    HS Troponin T 52 (*)     All other components within normal limits    Narrative:     High Sensitive Troponin T Reference Range:  <14.0 ng/L- Negative Female for AMI  <22.0 ng/L- Negative Male for AMI  >=14 - Abnormal Female indicating possible myocardial  injury.  >=22 - Abnormal Male indicating possible myocardial injury.   Clinicians would have to utilize clinical acumen, EKG, Troponin, and serial changes to determine if it is an Acute Myocardial Infarction or myocardial injury due to an underlying chronic condition.        CBC WITH AUTO DIFFERENTIAL - Abnormal; Notable for the following components:    RBC 3.95 (*)     Hemoglobin 10.6 (*)     Hematocrit 32.7 (*)     RDW 18.3 (*)     Lymphocyte % 18.1 (*)     nRBC 0.3 (*)     All other components within normal limits   RESPIRATORY PANEL PCR W/ COVID-19 (SARS-COV-2), NP SWAB IN UTM/VTP, 2 HR TAT - Normal    Narrative:     In the setting of a positive respiratory panel with a viral infection PLUS a negative procalcitonin without other underlying concern for bacterial infection, consider observing off antibiotics or discontinuation of antibiotics and continue supportive care. If the respiratory panel is positive for atypical bacterial infection (Bordetella pertussis, Chlamydophila pneumoniae, or Mycoplasma pneumoniae), consider antibiotic de-escalation to target atypical bacterial infection.   APTT - Normal   RAINBOW DRAW    Narrative:     The following orders were created for panel order [place] Draw.  Procedure                               Abnormality         Status                     ---------                               -----------         ------                     Green Top (Gel)[[id number]]                                  Final result               Lavender Top[[id number]]                                     Final result               Gold Top - SST[[id number]]                                   Final result               Light Blue Top[[id number]]                                   Final result                 Please view results for these tests on the individual orders.   HIGH SENSITIVITIY TROPONIN T 2HR   CBC AND DIFFERENTIAL    Narrative:     The following orders were created for panel order CBC &  Differential.  Procedure                               Abnormality         Status                     ---------                               -----------         ------                     CBC Auto Differential[[id number]]        Abnormal            Final result                 Please view results for these tests on the individual orders.   GREEN TOP   LAVENDER TOP   GOLD TOP - SST   LIGHT BLUE TOP       EKG:   ECG 12 Lead Chest Pain   Preliminary Result   HEART RATE=83  bpm   RR Fbbaqpaf=958  ms   NE Interval=  ms   P Horizontal Axis=  deg   P Front Axis=  deg   QRSD Hktbywph=453  ms   QT Mysrzjye=544  ms   GBiW=967  ms   QRS Axis=82  deg   T Wave Axis=221  deg   - ABNORMAL ECG -   Atrial fibrillation   LVH with secondary repolarization abnormality   ST depr, consider ischemia, inferior leads   Prolonged QT interval   Baseline wander in lead(s) III   Date and Time of Study:2024-09-04 04:50:55          Meds given in ED:   Medications   sodium chloride 0.9 % flush 10 mL (has no administration in time range)   hydrOXYzine (ATARAX) tablet 25 mg (has no administration in time range)   potassium chloride 10 mEq in 100 mL IVPB (10 mEq Intravenous New Bag 9/4/24 0604)     And   potassium chloride 10 mEq in 100 mL IVPB (10 mEq Intravenous New Bag 9/4/24 0726)     And   potassium chloride 10 mEq in 100 mL IVPB (has no administration in time range)     And   potassium chloride 10 mEq in 100 mL IVPB (has no administration in time range)     And   potassium chloride 10 mEq in 100 mL IVPB (has no administration in time range)     And   potassium chloride 10 mEq in 100 mL IVPB (has no administration in time range)   magnesium sulfate 2g/50 mL (PREMIX) infusion (2 g Intravenous New Bag 9/4/24 0555)   Potassium Replacement - Follow Nurse / BPA Driven Protocol (has no administration in time range)   Magnesium Standard Dose Replacement - Follow Nurse / BPA Driven Protocol (has no administration in time range)   Phosphorus Replacement  - Follow Nurse / BPA Driven Protocol (has no administration in time range)   Calcium Replacement - Follow Nurse / BPA Driven Protocol (has no administration in time range)   atorvastatin (LIPITOR) tablet 80 mg (has no administration in time range)   pantoprazole (PROTONIX) EC tablet 40 mg (has no administration in time range)   sodium chloride 0.9 % flush 10 mL (has no administration in time range)   sodium chloride 0.9 % flush 10 mL (has no administration in time range)   sodium chloride 0.9 % infusion 40 mL (has no administration in time range)   sennosides-docusate (PERICOLACE) 8.6-50 MG per tablet 2 tablet (has no administration in time range)     And   polyethylene glycol (MIRALAX) packet 17 g (has no administration in time range)     And   bisacodyl (DULCOLAX) EC tablet 5 mg (has no administration in time range)     And   bisacodyl (DULCOLAX) suppository 10 mg (has no administration in time range)   nitroglycerin (NITROSTAT) SL tablet 0.4 mg (has no administration in time range)   busPIRone (BUSPAR) tablet 15 mg (has no administration in time range)   metoprolol succinate XL (TOPROL-XL) 24 hr tablet 12.5 mg (has no administration in time range)   rivaroxaban (XARELTO) tablet 15 mg (has no administration in time range)   sertraline (ZOLOFT) tablet 100 mg (has no administration in time range)   spironolactone (ALDACTONE) tablet 25 mg (has no administration in time range)   arformoterol (BROVANA) nebulizer solution 15 mcg (has no administration in time range)     And   tiotropium (SPIRIVA RESPIMAT) 2.5 mcg/act aerosol solution inhaler (has no administration in time range)   LORazepam (ATIVAN) tablet 1 mg (has no administration in time range)     Or   LORazepam (ATIVAN) injection 1 mg (has no administration in time range)     Or   LORazepam (ATIVAN) tablet 2 mg (has no administration in time range)     Or   LORazepam (ATIVAN) injection 2 mg (has no administration in time range)     Or   LORazepam (ATIVAN)  injection 2 mg (has no administration in time range)     Or   LORazepam (ATIVAN) injection 2 mg (has no administration in time range)   folic acid (FOLVITE) tablet 1 mg (has no administration in time range)   thiamine (B-1) injection 200 mg (has no administration in time range)     Followed by   thiamine (VITAMIN B-1) tablet 100 mg (has no administration in time range)   ondansetron (ZOFRAN) injection 8 mg (4 mg Intravenous Given 9/4/24 3276)   potassium chloride (K-DUR,KLOR-CON) ER tablet 40 mEq (40 mEq Oral Given 9/4/24 6470)   droperidol (INAPSINE) injection 2.5 mg (2.5 mg Intravenous Given 9/4/24 4110)       Imaging results:  XR Chest 1 View    Result Date: 9/4/2024  Nonspecific bibasilar consolidation.  This report was finalized on 9/4/2024 5:26 AM by Dr. Kyung Garcia M.D on Workstation: [id number]       Ambulatory status:   - up at [facility]    Social issues:   Social History     Socioeconomic History    Marital status: Single   Tobacco Use    Smoking status: Every Day     Current packs/day: 1.00     Types: Cigarettes   Vaping Use    Vaping status: Never Used   Substance and Sexual Activity    Alcohol use: Yes     Comment: former alcoholic. drinks occassionally.    Drug use: No    Sexual activity: Defer       Peripheral Neurovascular  Peripheral Neurovascular (Adult)  Peripheral Neurovascular WDL: WDL    Neuro Cognitive  Neuro Cognitive (Adult)  Cognitive/Neuro/Behavioral WDL: WDL    Learning  Learning Assessment (Adult)  Learning Readiness and Ability: no barriers identified    Respiratory  Respiratory (Adult)  Airway WDL: WDL  Respiratory WDL  Respiratory WDL: .WDL except, rhythm/pattern  Rhythm/Pattern, Respiratory: shallow    Abdominal Pain       Pain Assessments  Pain (Adult)  (0-10) Pain Rating: Rest: 6  Pain Location: chest    NIH Stroke Scale       Coreen Darnell RN  09/04/24 07:28 EDT

## 2024-09-04 NOTE — ED NOTES
"Nursing report ED to floor  Javi Doran  64 y.o.  male    HPI :  HPI (Adult)  Stated Reason for Visit: chest pain  History Obtained From: patient, EMS    Chief Complaint  Chief Complaint   Patient presents with    Chest Pain       Admitting doctor:   Linda Everett MD    Admitting diagnosis:   The primary encounter diagnosis was Chest pain, unspecified type. Diagnoses of LBBB (left bundle branch block), Elevated troponin, Hypokalemia, Hypomagnesemia, Chronic kidney disease, unspecified CKD stage, and Anemia, unspecified type were also pertinent to this visit.    Code status:   Current Code Status       Date Active Code Status Order ID Comments User Context       9/4/2024 0715 CPR (Attempt to Resuscitate) [id number]  Linda Everett MD ED        Question Answer    Code Status (Patient has no pulse and is not breathing) CPR (Attempt to Resuscitate)    Medical Interventions (Patient has pulse or is breathing) Full Support    Level Of Support Discussed With Patient                    Allergies:   Patient has no known allergies.    Isolation:   No active isolations    Intake and Output    Intake/Output Summary (Last 24 hours) at 9/4/2024 0919  Last data filed at 9/4/2024 0859  Gross per 24 hour   Intake 850 ml   Output --   Net 850 ml       Weight:       09/04/24  0438   Weight: 77.1 kg (170 lb)       Most recent vitals:   Vitals:    09/04/24 0438 09/04/24 0546 09/04/24 0752 09/04/24 0851   BP: 133/88 134/86 133/81 120/89   Pulse: 73 79 79 84   Resp: 16      Temp: 97.2 °F (36.2 °C)      TempSrc: Tympanic      SpO2: 99% 97% 97% 96%   Weight: 77.1 kg (170 lb)      Height: 182.9 cm (72\")          Active LDAs/IV Access:   Lines, Drains & Airways       Active LDAs       Name Placement date Placement time Site Days    Peripheral IV 09/04/24 0759 Anterior;Proximal;Right Forearm 09/04/24  0759  Forearm  less than 1    Peripheral IV 09/04/24 0700 Anterior;Distal;Right Forearm 09/04/24  0700  Forearm  less than 1 "                    Labs (abnormal labs have a star):   Labs Reviewed   PROTIME-INR - Abnormal; Notable for the following components:       Result Value    Protime 16.1 (*)     INR 1.27 (*)     All other components within normal limits   MAGNESIUM - Abnormal; Notable for the following components:    Magnesium 1.5 (*)     All other components within normal limits   BNP (IN-HOUSE) - Abnormal; Notable for the following components:    proBNP 3,936.0 (*)     All other components within normal limits    Narrative:     This assay is used as an aid in the diagnosis of individuals suspected of having heart failure. It can be used as an aid in the diagnosis of acute decompensated heart failure (ADHF) in patients presenting with signs and symptoms of ADHF to the emergency department (ED). In addition, NT-proBNP of <300 pg/mL indicates ADHF is not likely.    Age Range Result Interpretation  NT-proBNP Concentration (pg/mL:      <50             Positive            >450                   Gray                 300-450                    Negative             <300    50-75           Positive            >900                  Gray                300-900                  Negative            <300      >75             Positive            >1800                  Gray                300-1800                  Negative            <300   COMPREHENSIVE METABOLIC PANEL - Abnormal; Notable for the following components:    Glucose 109 (*)     BUN 6 (*)     Creatinine 1.36 (*)     Potassium 2.3 (*)     Chloride 94 (*)     Alkaline Phosphatase 193 (*)     BUN/Creatinine Ratio 4.4 (*)     eGFR 58.5 (*)     All other components within normal limits    Narrative:     GFR Normal >60  Chronic Kidney Disease <60  Kidney Failure <15     TROPONIN - Abnormal; Notable for the following components:    HS Troponin T 52 (*)     All other components within normal limits    Narrative:     High Sensitive Troponin T Reference Range:  <14.0 ng/L- Negative Female for  AMI  <22.0 ng/L- Negative Male for AMI  >=14 - Abnormal Female indicating possible myocardial injury.  >=22 - Abnormal Male indicating possible myocardial injury.   Clinicians would have to utilize clinical acumen, EKG, Troponin, and serial changes to determine if it is an Acute Myocardial Infarction or myocardial injury due to an underlying chronic condition.        CBC WITH AUTO DIFFERENTIAL - Abnormal; Notable for the following components:    RBC 3.95 (*)     Hemoglobin 10.6 (*)     Hematocrit 32.7 (*)     RDW 18.3 (*)     Lymphocyte % 18.1 (*)     nRBC 0.3 (*)     All other components within normal limits   HIGH SENSITIVITIY TROPONIN T 2HR - Abnormal; Notable for the following components:    HS Troponin T 45 (*)     Troponin T Delta -7 (*)     All other components within normal limits    Narrative:     High Sensitive Troponin T Reference Range:  <14.0 ng/L- Negative Female for AMI  <22.0 ng/L- Negative Male for AMI  >=14 - Abnormal Female indicating possible myocardial injury.  >=22 - Abnormal Male indicating possible myocardial injury.   Clinicians would have to utilize clinical acumen, EKG, Troponin, and serial changes to determine if it is an Acute Myocardial Infarction or myocardial injury due to an underlying chronic condition.        RESPIRATORY PANEL PCR W/ COVID-19 (SARS-COV-2), NP SWAB IN UTM/VTP, 2 HR TAT - Normal    Narrative:     In the setting of a positive respiratory panel with a viral infection PLUS a negative procalcitonin without other underlying concern for bacterial infection, consider observing off antibiotics or discontinuation of antibiotics and continue supportive care. If the respiratory panel is positive for atypical bacterial infection (Bordetella pertussis, Chlamydophila pneumoniae, or Mycoplasma pneumoniae), consider antibiotic de-escalation to target atypical bacterial infection.   APTT - Normal   RAINBOW DRAW    Narrative:     The following orders were created for panel order  [place] Draw.  Procedure                               Abnormality         Status                     ---------                               -----------         ------                     Green Top (Gel)[[id number]]                                  Final result               Lavender Top[[id number]]                                     Final result               Gold Top - SST[[id number]]                                   Final result               Light Blue Top[[id number]]                                   Final result                 Please view results for these tests on the individual orders.   CBC AND DIFFERENTIAL    Narrative:     The following orders were created for panel order CBC & Differential.  Procedure                               Abnormality         Status                     ---------                               -----------         ------                     CBC Auto Differential[[id number]]        Abnormal            Final result                 Please view results for these tests on the individual orders.   GREEN TOP   LAVENDER TOP   GOLD TOP - SST   LIGHT BLUE TOP       EKG:   ECG 12 Lead Chest Pain   Preliminary Result   HEART RATE=83  bpm   RR Fvxxgqcu=815  ms   WA Interval=  ms   P Horizontal Axis=  deg   P Front Axis=  deg   QRSD Rpsiphlg=252  ms   QT Owrnfkpm=398  ms   YGbO=045  ms   QRS Axis=82  deg   T Wave Axis=221  deg   - ABNORMAL ECG -   Atrial fibrillation   LVH with secondary repolarization abnormality   ST depr, consider ischemia, inferior leads   Prolonged QT interval   Baseline wander in lead(s) III   Date and Time of Study:2024-09-04 04:50:55          Meds given in ED:   Medications   sodium chloride 0.9 % flush 10 mL (has no administration in time range)   hydrOXYzine (ATARAX) tablet 25 mg (has no administration in time range)   potassium chloride 10 mEq in 100 mL IVPB (0 mEq Intravenous Stopped 9/4/24 0729)     And   potassium chloride 10 mEq in 100 mL IVPB (0 mEq Intravenous  Stopped 9/4/24 0859)     And   potassium chloride 10 mEq in 100 mL IVPB (10 mEq Intravenous New Bag 9/4/24 0856)     And   potassium chloride 10 mEq in 100 mL IVPB (has no administration in time range)     And   potassium chloride 10 mEq in 100 mL IVPB (has no administration in time range)     And   potassium chloride 10 mEq in 100 mL IVPB (has no administration in time range)   Potassium Replacement - Follow Nurse / BPA Driven Protocol (has no administration in time range)   Magnesium Standard Dose Replacement - Follow Nurse / BPA Driven Protocol (has no administration in time range)   Phosphorus Replacement - Follow Nurse / BPA Driven Protocol (has no administration in time range)   Calcium Replacement - Follow Nurse / BPA Driven Protocol (has no administration in time range)   atorvastatin (LIPITOR) tablet 80 mg (has no administration in time range)   pantoprazole (PROTONIX) EC tablet 40 mg (has no administration in time range)   sodium chloride 0.9 % flush 10 mL (has no administration in time range)   sodium chloride 0.9 % flush 10 mL (has no administration in time range)   sodium chloride 0.9 % infusion 40 mL (has no administration in time range)   sennosides-docusate (PERICOLACE) 8.6-50 MG per tablet 2 tablet (has no administration in time range)     And   polyethylene glycol (MIRALAX) packet 17 g (has no administration in time range)     And   bisacodyl (DULCOLAX) EC tablet 5 mg (has no administration in time range)     And   bisacodyl (DULCOLAX) suppository 10 mg (has no administration in time range)   nitroglycerin (NITROSTAT) SL tablet 0.4 mg (has no administration in time range)   busPIRone (BUSPAR) tablet 15 mg (has no administration in time range)   metoprolol succinate XL (TOPROL-XL) 24 hr tablet 12.5 mg (has no administration in time range)   rivaroxaban (XARELTO) tablet 15 mg (has no administration in time range)   sertraline (ZOLOFT) tablet 100 mg (has no administration in time range)    spironolactone (ALDACTONE) tablet 25 mg (has no administration in time range)   arformoterol (BROVANA) nebulizer solution 15 mcg (has no administration in time range)     And   tiotropium (SPIRIVA RESPIMAT) 2.5 mcg/act aerosol solution inhaler (has no administration in time range)   LORazepam (ATIVAN) tablet 1 mg ( Oral Not Given:  See Alt 9/4/24 0748)     Or   LORazepam (ATIVAN) injection 1 mg ( Intravenous Not Given:  See Alt 9/4/24 0748)     Or   LORazepam (ATIVAN) tablet 2 mg (2 mg Oral Given 9/4/24 0748)     Or   LORazepam (ATIVAN) injection 2 mg ( Intravenous Not Given:  See Alt 9/4/24 0748)     Or   LORazepam (ATIVAN) injection 2 mg ( Intravenous Not Given:  See Alt 9/4/24 0748)     Or   LORazepam (ATIVAN) injection 2 mg ( Intramuscular Not Given:  See Alt 9/4/24 0748)   folic acid (FOLVITE) tablet 1 mg (has no administration in time range)   thiamine (B-1) injection 200 mg (has no administration in time range)     Followed by   thiamine (VITAMIN B-1) tablet 100 mg (has no administration in time range)   magnesium sulfate 2g/50 mL (PREMIX) infusion (2 g Intravenous New Bag 9/4/24 0828)   ondansetron (ZOFRAN) injection 8 mg (4 mg Intravenous Given 9/4/24 0455)   potassium chloride (K-DUR,KLOR-CON) ER tablet 40 mEq (40 mEq Oral Given 9/4/24 0555)   magnesium sulfate 2g/50 mL (PREMIX) infusion (0 g Intravenous Stopped 9/4/24 0806)   droperidol (INAPSINE) injection 2.5 mg (2.5 mg Intravenous Given 9/4/24 0552)       Imaging results:  XR Chest 1 View    Result Date: 9/4/2024  Nonspecific bibasilar consolidation.  This report was finalized on 9/4/2024 5:26 AM by Dr. Kyung Garcia M.D on Workstation: [id number]       Ambulatory status:   - up with assist    Social issues:   Social History     Socioeconomic History    Marital status: Single   Tobacco Use    Smoking status: Every Day     Current packs/day: 1.00     Types: Cigarettes   Vaping Use    Vaping status: Never Used   Substance and Sexual Activity     Alcohol use: Yes     Comment: former alcoholic. drinks occassionally.    Drug use: No    Sexual activity: Defer       Peripheral Neurovascular  Peripheral Neurovascular (Adult)  Peripheral Neurovascular WDL: WDL    Neuro Cognitive  Neuro Cognitive (Adult)  Cognitive/Neuro/Behavioral WDL: WDL    Learning  Learning Assessment (Adult)  Learning Readiness and Ability: no barriers identified    Respiratory  Respiratory (Adult)  Airway WDL: WDL  Respiratory WDL  Respiratory WDL: .WDL except, rhythm/pattern  Rhythm/Pattern, Respiratory: shallow    Abdominal Pain       Pain Assessments  Pain (Adult)  (0-10) Pain Rating: Rest: 6  Pain Location: chest    NIH Stroke Scale       Coreen Darnell RN  09/04/24 09:19 EDT

## 2024-09-04 NOTE — PLAN OF CARE
Goal Outcome Evaluation:  Plan of Care Reviewed With: patient        Progress: no change  Outcome Evaluation: Patient admitted due to SOB, Chest pain, alcohol withdrawal, drowssiness, open eyes by calling his name reciving Potasium and Magnesium IV  hypotensive notify MD aware condition of patient, afebril  in room air afib LBB on monitor. will continue monitor                                TRANSFER - OUT REPORT:    Verbal report given to Ingrid Hannah on Reva Spragueort  being transferred to CVICU for routine progression of care       Report consisted of patients Situation, Background, Assessment and   Recommendations(SBAR). Information from the following report(s) OR Summary was reviewed with the receiving nurse. Lines:   Double Lumen 05/18/18 Right Internal jugular (Active)       Warren Ramon Dual 05/18/18 Right Neck (Active)       Peripheral IV 05/17/18 Left Antecubital (Active)   Site Assessment Clean, dry, & intact 5/18/2018  7:01 AM   Phlebitis Assessment 0 5/18/2018  7:01 AM   Infiltration Assessment 0 5/18/2018  7:01 AM   Dressing Status Clean, dry, & intact 5/18/2018  7:01 AM   Dressing Type Transparent;Tape 5/18/2018  7:01 AM   Hub Color/Line Status Pink;Patent; Infusing 5/18/2018  7:01 AM   Alcohol Cap Used No 5/18/2018  7:01 AM       Peripheral IV 05/17/18 Right Wrist (Active)   Site Assessment Clean, dry, & intact 5/18/2018  7:01 AM   Phlebitis Assessment 0 5/18/2018  7:01 AM   Infiltration Assessment 0 5/18/2018  7:01 AM   Dressing Status Clean, dry, & intact 5/18/2018  7:01 AM   Dressing Type Transparent;Tape 5/18/2018  7:01 AM   Hub Color/Line Status Green;Patent; Flushed 5/18/2018  7:01 AM   Alcohol Cap Used No 5/18/2018  7:01 AM       Arterial Line 05/18/18 Left Radial artery (Active)        Opportunity for questions and clarification was provided.       Patient transported with:   Monitor

## 2024-09-05 LAB
ALBUMIN SERPL-MCNC: 3.4 G/DL (ref 3.5–5.2)
ALBUMIN/GLOB SERPL: 1.3 G/DL
ALP SERPL-CCNC: 156 U/L (ref 39–117)
ALT SERPL W P-5'-P-CCNC: 12 U/L (ref 1–41)
ANION GAP SERPL CALCULATED.3IONS-SCNC: 8.8 MMOL/L (ref 5–15)
AST SERPL-CCNC: 25 U/L (ref 1–40)
BILIRUB SERPL-MCNC: 1.2 MG/DL (ref 0–1.2)
BUN SERPL-MCNC: 9 MG/DL (ref 8–23)
BUN/CREAT SERPL: 7 (ref 7–25)
CALCIUM SPEC-SCNC: 8.5 MG/DL (ref 8.6–10.5)
CHLORIDE SERPL-SCNC: 101 MMOL/L (ref 98–107)
CO2 SERPL-SCNC: 28.2 MMOL/L (ref 22–29)
CREAT SERPL-MCNC: 1.29 MG/DL (ref 0.76–1.27)
DEPRECATED RDW RBC AUTO: 54.4 FL (ref 37–54)
EGFRCR SERPLBLD CKD-EPI 2021: 62.3 ML/MIN/1.73
ERYTHROCYTE [DISTWIDTH] IN BLOOD BY AUTOMATED COUNT: 18.1 % (ref 12.3–15.4)
GLOBULIN UR ELPH-MCNC: 2.7 GM/DL
GLUCOSE SERPL-MCNC: 99 MG/DL (ref 65–99)
HCT VFR BLD AUTO: 30.5 % (ref 37.5–51)
HGB BLD-MCNC: 9.8 G/DL (ref 13–17.7)
MAGNESIUM SERPL-MCNC: 2.5 MG/DL (ref 1.6–2.4)
MCH RBC QN AUTO: 26.7 PG (ref 26.6–33)
MCHC RBC AUTO-ENTMCNC: 32.1 G/DL (ref 31.5–35.7)
MCV RBC AUTO: 83.1 FL (ref 79–97)
PHOSPHATE SERPL-MCNC: 2.8 MG/DL (ref 2.5–4.5)
PLATELET # BLD AUTO: 159 10*3/MM3 (ref 140–450)
PMV BLD AUTO: 8.8 FL (ref 6–12)
POTASSIUM SERPL-SCNC: 3.4 MMOL/L (ref 3.5–5.2)
POTASSIUM SERPL-SCNC: 4.7 MMOL/L (ref 3.5–5.2)
PROT SERPL-MCNC: 6.1 G/DL (ref 6–8.5)
QT INTERVAL: 468 MS
QT INTERVAL: 489 MS
QTC INTERVAL: 502 MS
QTC INTERVAL: 575 MS
RBC # BLD AUTO: 3.67 10*6/MM3 (ref 4.14–5.8)
SODIUM SERPL-SCNC: 138 MMOL/L (ref 136–145)
WBC NRBC COR # BLD AUTO: 5.28 10*3/MM3 (ref 3.4–10.8)

## 2024-09-05 PROCEDURE — 85027 COMPLETE CBC AUTOMATED: CPT | Performed by: STUDENT IN AN ORGANIZED HEALTH CARE EDUCATION/TRAINING PROGRAM

## 2024-09-05 PROCEDURE — 94761 N-INVAS EAR/PLS OXIMETRY MLT: CPT

## 2024-09-05 PROCEDURE — 94799 UNLISTED PULMONARY SVC/PX: CPT

## 2024-09-05 PROCEDURE — 36415 COLL VENOUS BLD VENIPUNCTURE: CPT | Performed by: STUDENT IN AN ORGANIZED HEALTH CARE EDUCATION/TRAINING PROGRAM

## 2024-09-05 PROCEDURE — 83735 ASSAY OF MAGNESIUM: CPT | Performed by: STUDENT IN AN ORGANIZED HEALTH CARE EDUCATION/TRAINING PROGRAM

## 2024-09-05 PROCEDURE — 99214 OFFICE O/P EST MOD 30 MIN: CPT | Performed by: INTERNAL MEDICINE

## 2024-09-05 PROCEDURE — 97110 THERAPEUTIC EXERCISES: CPT

## 2024-09-05 PROCEDURE — 25010000002 THIAMINE PER 100 MG: Performed by: STUDENT IN AN ORGANIZED HEALTH CARE EDUCATION/TRAINING PROGRAM

## 2024-09-05 PROCEDURE — 97162 PT EVAL MOD COMPLEX 30 MIN: CPT

## 2024-09-05 PROCEDURE — 94664 DEMO&/EVAL PT USE INHALER: CPT

## 2024-09-05 PROCEDURE — 97166 OT EVAL MOD COMPLEX 45 MIN: CPT

## 2024-09-05 PROCEDURE — 25010000002 THIAMINE HCL 200 MG/2ML SOLUTION: Performed by: STUDENT IN AN ORGANIZED HEALTH CARE EDUCATION/TRAINING PROGRAM

## 2024-09-05 PROCEDURE — 84132 ASSAY OF SERUM POTASSIUM: CPT | Performed by: HOSPITALIST

## 2024-09-05 PROCEDURE — G0378 HOSPITAL OBSERVATION PER HR: HCPCS

## 2024-09-05 PROCEDURE — 84100 ASSAY OF PHOSPHORUS: CPT | Performed by: STUDENT IN AN ORGANIZED HEALTH CARE EDUCATION/TRAINING PROGRAM

## 2024-09-05 PROCEDURE — 80053 COMPREHEN METABOLIC PANEL: CPT | Performed by: STUDENT IN AN ORGANIZED HEALTH CARE EDUCATION/TRAINING PROGRAM

## 2024-09-05 PROCEDURE — 97535 SELF CARE MNGMENT TRAINING: CPT

## 2024-09-05 RX ORDER — POTASSIUM CHLORIDE 750 MG/1
40 TABLET, FILM COATED, EXTENDED RELEASE ORAL EVERY 4 HOURS
Status: COMPLETED | OUTPATIENT
Start: 2024-09-05 | End: 2024-09-05

## 2024-09-05 RX ORDER — SPIRONOLACTONE 25 MG/1
25 TABLET ORAL DAILY
Status: DISCONTINUED | OUTPATIENT
Start: 2024-09-05 | End: 2024-09-07 | Stop reason: HOSPADM

## 2024-09-05 RX ADMIN — THIAMINE HYDROCHLORIDE 200 MG: 100 INJECTION, SOLUTION INTRAMUSCULAR; INTRAVENOUS at 05:47

## 2024-09-05 RX ADMIN — PANTOPRAZOLE SODIUM 40 MG: 40 TABLET, DELAYED RELEASE ORAL at 08:45

## 2024-09-05 RX ADMIN — FOLIC ACID 1 MG: 1 TABLET ORAL at 08:45

## 2024-09-05 RX ADMIN — LORAZEPAM 2 MG: 1 TABLET ORAL at 05:46

## 2024-09-05 RX ADMIN — SERTRALINE 100 MG: 100 TABLET, FILM COATED ORAL at 20:55

## 2024-09-05 RX ADMIN — THIAMINE HYDROCHLORIDE 200 MG: 100 INJECTION, SOLUTION INTRAMUSCULAR; INTRAVENOUS at 14:08

## 2024-09-05 RX ADMIN — POTASSIUM CHLORIDE 40 MEQ: 750 TABLET, EXTENDED RELEASE ORAL at 08:45

## 2024-09-05 RX ADMIN — ARFORMOTEROL TARTRATE 15 MCG: 15 SOLUTION RESPIRATORY (INHALATION) at 19:55

## 2024-09-05 RX ADMIN — THIAMINE HYDROCHLORIDE 200 MG: 100 INJECTION, SOLUTION INTRAMUSCULAR; INTRAVENOUS at 21:00

## 2024-09-05 RX ADMIN — RIVAROXABAN 15 MG: 15 TABLET, FILM COATED ORAL at 17:14

## 2024-09-05 RX ADMIN — POTASSIUM CHLORIDE 40 MEQ: 750 TABLET, EXTENDED RELEASE ORAL at 12:14

## 2024-09-05 RX ADMIN — SPIRONOLACTONE 25 MG: 25 TABLET, FILM COATED ORAL at 14:08

## 2024-09-05 RX ADMIN — LORAZEPAM 1 MG: 1 TABLET ORAL at 20:58

## 2024-09-05 RX ADMIN — Medication 10 ML: at 08:45

## 2024-09-05 RX ADMIN — ASPIRIN 81 MG: 81 TABLET, COATED ORAL at 08:45

## 2024-09-05 RX ADMIN — ATORVASTATIN CALCIUM 80 MG: 80 TABLET, FILM COATED ORAL at 08:45

## 2024-09-05 RX ADMIN — ARFORMOTEROL TARTRATE 15 MCG: 15 SOLUTION RESPIRATORY (INHALATION) at 06:43

## 2024-09-05 RX ADMIN — Medication 10 ML: at 20:56

## 2024-09-05 RX ADMIN — PANTOPRAZOLE SODIUM 40 MG: 40 TABLET, DELAYED RELEASE ORAL at 17:14

## 2024-09-05 RX ADMIN — METOPROLOL SUCCINATE 12.5 MG: 25 TABLET, EXTENDED RELEASE ORAL at 08:45

## 2024-09-05 RX ADMIN — LORAZEPAM 1 MG: 1 TABLET ORAL at 14:08

## 2024-09-05 NOTE — PLAN OF CARE
Goal Outcome Evaluation:      Denies chest pain, denies the need for nausea medication, sylvie meals, ciwa highest 10, ativan PRN, denies soa, slept most of shift. Potassium protocol

## 2024-09-05 NOTE — PROGRESS NOTES
"    DAILY PROGRESS NOTE  [facility]    Patient Identification:  Name: Javi Doran  Age: 64 y.o.  Sex: male  :  1960  MRN: [id number]         Primary Care Physician: Ginette Bryant MD    Subjective:  Interval History: Feeling better overall.  Denies any confusion.  Admits to decreased sleep.  Denies any hallucination or confusion chest pain nausea or vomiting    Objective: Resting though easily awakened and conversational and pleasant.  No family present at bedside    Scheduled Meds:arformoterol, 15 mcg, Nebulization, BID - RT   And  tiotropium bromide monohydrate, 2 puff, Inhalation, Daily - RT  aspirin, 81 mg, Oral, Daily  atorvastatin, 80 mg, Oral, Daily  folic acid, 1 mg, Oral, Daily  metoprolol succinate XL, 12.5 mg, Oral, Q24H  pantoprazole, 40 mg, Oral, BID AC  rivaroxaban, 15 mg, Oral, Daily With Dinner  sertraline, 100 mg, Oral, Nightly  sodium chloride, 10 mL, Intravenous, Q12H  spironolactone, 25 mg, Oral, Daily  thiamine (B-1) IV, 200 mg, Intravenous, Q8H   Followed by  [START ON 2024] thiamine, 100 mg, Oral, Daily      Continuous Infusions:     Vital signs in last 24 hours:  Temp:  [97.3 °F (36.3 °C)-97.9 °F (36.6 °C)] 97.4 °F (36.3 °C)  Heart Rate:  [67-73] 71  Resp:  [18] 18  BP: ()/(53-80) 107/75    Intake/Output:    Intake/Output Summary (Last 24 hours) at 2024 1240  Last data filed at 2024 0325  Gross per 24 hour   Intake 300 ml   Output 525 ml   Net -225 ml       Exam:  /75 (BP Location: Left arm, Patient Position: Lying)   Pulse 71   Temp 97.4 °F (36.3 °C)   Resp 18   Ht 182.9 cm (72.01\")   Wt 70.9 kg (156 lb 3.2 oz)   SpO2 98%   BMI 21.18 kg/m²     General Appearance:    Alert, cooperative, nontoxic, AAOx3, nontremulous                          Head:    Normocephalic, without obvious abnormality, atraumatic                           Eyes:    PERRL, nonicteric, EOM's intact, both eyes                         Throat:   " Oral mucosa pink and moist                           Neck:   No JVD                         Lungs:    Clear to auscultation bilaterally, respirations unlabored                          Heart:    Regular rate and rhythm, S1 and S2 normal                  Abdomen:     Soft, nontender, bowel sounds active                 Extremities: Moving all, no cyanosis or edema                        Pulses:   Pulses palpable in all extremities                            Skin:   Skin is warm and dry, nonjaundiced                  neurologic:   CNII-XII intact     Data Review:  Labs in chart were reviewed.    Assessment:  Active Hospital Problems    Diagnosis  POA    Hypokalemia [E87.6]  Yes    Essential hypertension [I10]  Yes    Gastroesophageal reflux disease [K21.9]  Yes    Chronic kidney disease [N18.9]  Yes      Resolved Hospital Problems   No resolved problems to display.       Plan:    Electrolyte imbalances secondary to alcohol abuse   -Replacing K/mag   -Lasix/spironolactone on hold/euvolemic -hypotension improving currently 107/75 and likely will restart tomorrow pending further trends    Chest pain resolved with PAF/chronic diastolic CHF   -Cardiology following-repeat TTE   -Continue ASA/statin/metoprolol/spironolactone/Xarelto    CKD 3A-B stable with improved creatinine    PT/CCP for discharge needs -hopeful for DC maybe tomorrow    Christiano Purcell MD  9/5/2024  12:40 EDT

## 2024-09-05 NOTE — PLAN OF CARE
Goal Outcome Evaluation:      Patient is a 64 y.o. male admitted to [facility] on 9/4/2024 for chest pain and elevated troponin. PMHx includes alcohol use and CKD. Patient is independent at baseline and lives at home alone- denies any fall history or prior AD use. Today, patient performed bed mobility with SV, required SV-SBA for transfers, and ambulated 75' SV-SBA without any AD. Mild dizziness reported during ambulation trial though no overt LOB or veering noted. Although near baseline, patient may benefit from skilled PT services acutely to address functional deficits as well as improve level of independence prior to discharge. Anticipate returning home with possible HH PT upon DC. PT will follow peripherally. Encourage [facility]IC for all meals and ambulation 3x/day to promote functional mobility during hospitalization.      Anticipated Discharge Disposition (PT): home with home health

## 2024-09-05 NOTE — THERAPY EVALUATION
Patient Name: Javi Doran  : 1960    MRN: [id number]                              Today's Date: 2024       Admit Date: 2024    Visit Dx:     ICD-10-CM ICD-9-CM   1. Chest pain, unspecified type  R07.9 786.50   2. LBBB (left bundle branch block)  I44.7 426.3   3. Elevated troponin  R79.89 790.6   4. Hypokalemia  E87.6 276.8   5. Hypomagnesemia  E83.42 275.2   6. Chronic kidney disease, unspecified CKD stage  N18.9 585.9   7. Anemia, unspecified type  D64.9 285.9     Patient Active Problem List   Diagnosis    Elevated alanine aminotransferase (ALT) level    Anemia    Anxiety    Coronary arteriosclerosis in native artery    Cobalamin deficiency    Mass of breast    Chronic kidney disease    Acute on chronic systolic congestive heart failure    Constipation    Gastroesophageal reflux disease    Fatigue    Gastric ulcer    Gynecomastia    History of alcohol abuse    Hyperlipidemia    Hypogonadism in male    Major depressive disorder, recurrent episode    Primary insomnia    Temporary cerebral vascular dysfunction    Essential hypertension    Benzodiazepine misuse    EDMOND (acute kidney injury)    Alcohol withdrawal syndrome without complication    Hypokalemia     Past Medical History:   Diagnosis Date    Alcoholism     Atrial fibrillation     Constipation     Depression     Depression with anxiety     Obstructive hypertrophic cardiomyopathy     Persistent insomnia     Solitary pulmonary nodule on lung CT      Past Surgical History:   Procedure Laterality Date    ADENOIDECTOMY      CARDIAC SURGERY      HEMORRHOIDECTOMY      OTHER SURGICAL HISTORY      PTCA: DIAGNOSTIC CATH CORONARY DOMINANCE    OTHER SURGICAL HISTORY      TONSILLECTOMY WITH ADENOIDECTOMY    TONSILLECTOMY        General Information       Row Name 24 0829          Physical Therapy Time and Intention    Document Type evaluation  -MS     Mode of Treatment physical therapy  -MS       Row Name 24 7565          General Information     Patient Profile Reviewed yes  -MS     Prior Level of Function independent:;all household mobility  -MS     Existing Precautions/Restrictions fall  -MS     Barriers to Rehab none identified  -MS       Row Name 09/05/24 0940          Living Environment    People in Home alone  -MS       Row Name 09/05/24 0940          Home Main Entrance    Number of Stairs, Main Entrance three  -MS       Row Name 09/05/24 0940          Cognition    Orientation Status (Cognition) oriented x 4  -MS       Row Name 09/05/24 0940 09/05/24 0939       Safety Issues, Functional Mobility    Safety Issues Affecting Function (Mobility) insight into deficits/self-awareness;positioning of assistive device;sequencing abilities  -MS --    Impairments Affecting Function (Mobility) balance;endurance/activity tolerance  -MS --    Comment, Safety Issues/Impairments (Mobility) Gait belt and non skid socks donned.  -MS Gait belt and non skid socks donned.  -MS              User Key  (r) = Recorded By, (t) = Taken By, (c) = Cosigned By      Initials Name Provider Type    MS Brittani Tomlinson, PT Physical Therapist                   Mobility       Row Name 09/05/24 0941          Bed Mobility    Bed Mobility supine-sit;sit-supine  -MS     Supine-Sit [place] (Bed Mobility) supervision  -MS     Sit-Supine [place] (Bed Mobility) supervision  -MS       Row Name 09/05/24 0941          Sit-Stand Transfer    Sit-Stand [place] (Transfers) supervision;standby assist;verbal cues  -MS     Assistive Device (Sit-Stand Transfers) --  no AD  -MS       Row Name 09/05/24 0941          Gait/Stairs (Locomotion)    [place] Level (Gait) supervision;standby assist;verbal cues  -MS     Assistive Device (Gait) --  no AD  -MS     Patient was able to Ambulate yes  -MS     Distance in Feet (Gait) 75  -MS     Deviations/Abnormal Patterns (Gait) nita decreased;gait speed decreased  -MS     Bilateral Gait Deviations forward flexed posture  -MS     Comment,  (Gait/Stairs) No overt LOB or veering noted. Mild dizziness reported but subsided following ambulation. Declined sitting UIC.  -MS               User Key  (r) = Recorded By, (t) = Taken By, (c) = Cosigned By      Initials Name Provider Type    Brittani Ruiz, PT Physical Therapist                   Obj/Interventions       Row Name 09/05/24 0942          Range of Motion Comprehensive    General Range of Motion no range of motion deficits identified  -MS       Row Name 09/05/24 0942          Strength Comprehensive (MMT)    Comment, General Manual Muscle Testing (MMT) Assessment B LEs grossly 75% WFL  -MS       Row Name 09/05/24 0942          Balance    Balance Assessment sitting static balance;standing static balance  -MS     Static Sitting Balance standby assist  -MS     Position, Sitting Balance sitting edge of bed  -MS     Static Standing Balance standby assist  -MS     Position/Device Used, Standing Balance unsupported  -MS       Row Name 09/05/24 0942          Sensory Assessment (Somatosensory)    Sensory Assessment (Somatosensory) sensation intact  -MS               User Key  (r) = Recorded By, (t) = Taken By, (c) = Cosigned By      Initials Name Provider Type    Brittani Ruiz, PT Physical Therapist                   Goals/Plan    No documentation.                  Clinical Impression       Row Name 09/05/24 0942          Pain    Pretreatment Pain Rating 0/10 - no pain  -MS     Posttreatment Pain Rating 0/10 - no pain  -MS       Row Name 09/05/24 0942          Therapy Assessment/Plan (PT)    Rehab Potential (PT) fair, will monitor progress closely  -MS     Criteria for Skilled Interventions Met (PT) yes;meets criteria  -MS     Therapy Frequency (PT) 3 times/wk  -MS       Row Name 09/05/24 0942          Vital Signs    O2 Delivery Pre Treatment room air  -MS       Row Name 09/05/24 0942          Positioning and Restraints    Pre-Treatment Position in bed  -MS     Post Treatment Position bed  -MS      In Bed notified nsg;call light within reach;fowlers;encouraged to call for assist;exit alarm on  -MS               User Key  (r) = Recorded By, (t) = Taken By, (c) = Cosigned By      Initials Name Provider Type    MS TomlinsonBrittani, PT Physical Therapist                   Outcome Measures       Row Name 09/05/24 0943          How much help from another person do you currently need...    Turning from your back to your side while in flat bed without using bedrails? 4  -MS     Moving from lying on back to sitting on the side of a flat bed without bedrails? 4  -MS     Moving to and from a bed to a chair (including a wheelchair)? 4  -MS     Standing up from a chair using your arms (e.g., wheelchair, bedside chair)? 3  -MS     Climbing 3-5 steps with a railing? 3  -MS     To walk in hospital room? 3  -MS     AM-PAC 6 Clicks Score (PT) 21  -MS     Highest Level of Mobility Goal 6 --> Walk 10 steps or more  -MS               User Key  (r) = Recorded By, (t) = Taken By, (c) = Cosigned By      Initials Name Provider Type    MS YanceysBrittani, PT Physical Therapist                                   PT Recommendation and Plan           Time Calculation:         PT Charges       Row Name 09/05/24 0939             Time Calculation    Start Time 0807  -MS      Stop Time 0822  -MS      Time Calculation (min) 15 min  -MS      PT Received On 09/05/24  -MS      PT - Next Appointment 09/09/24  -MS      PT Goal Re-Cert Due Date 09/12/24  -MS                User Key  (r) = Recorded By, (t) = Taken By, (c) = Cosigned By      Initials Name Provider Type    Brittani Ruiz PT Physical Therapist                  Therapy Charges for Today       Code Description Service Date Service Provider Modifiers Qty    51596946684 HC PT EVAL MOD COMPLEXITY 3 9/5/2024 Brittani Tomlinson, PT GP 1    98269711225 HC PT THER PROC EA 15 MIN 9/5/2024 Brittani Tomlinson, PT GP 1            PT G-Codes  AM-PAC 6 Clicks Score (PT): 21  PT Discharge  Summary  Anticipated Discharge Disposition (PT): home with home health    Brittani Tomlinson, PT  9/5/2024

## 2024-09-05 NOTE — PLAN OF CARE
Goal Outcome Evaluation:  Plan of Care Reviewed With: patient           Outcome Evaluation: Pt admit with chest pain. History includes ETOH abuse, cardioversion, multiple admissions. Pt lives alone. Pt presents asleep in bed.  Pt is SBA to move OOB, neeraj shoes, walk to bathroom.  Pt brushes teeth at sink and walks away from sink with water running.  No overt LOB but pt demo weaving on feet.   Will cont to follow for skilled OT for continued activity tolerance and safety/independence.      Anticipated Discharge Disposition (OT): home with supervision

## 2024-09-05 NOTE — THERAPY EVALUATION
Patient Name: Javi Doran  : 1960    MRN: [id number]                              Today's Date: 2024       Admit Date: 2024    Visit Dx:     ICD-10-CM ICD-9-CM   1. Chest pain, unspecified type  R07.9 786.50   2. LBBB (left bundle branch block)  I44.7 426.3   3. Elevated troponin  R79.89 790.6   4. Hypokalemia  E87.6 276.8   5. Hypomagnesemia  E83.42 275.2   6. Chronic kidney disease, unspecified CKD stage  N18.9 585.9   7. Anemia, unspecified type  D64.9 285.9     Patient Active Problem List   Diagnosis    Elevated alanine aminotransferase (ALT) level    Anemia    Anxiety    Coronary arteriosclerosis in native artery    Cobalamin deficiency    Mass of breast    Chronic kidney disease    Acute on chronic systolic congestive heart failure    Constipation    Gastroesophageal reflux disease    Fatigue    Gastric ulcer    Gynecomastia    History of alcohol abuse    Hyperlipidemia    Hypogonadism in male    Major depressive disorder, recurrent episode    Primary insomnia    Temporary cerebral vascular dysfunction    Essential hypertension    Benzodiazepine misuse    EDMOND (acute kidney injury)    Alcohol withdrawal syndrome without complication    Hypokalemia     Past Medical History:   Diagnosis Date    Alcoholism     Atrial fibrillation     Constipation     Depression     Depression with anxiety     Obstructive hypertrophic cardiomyopathy     Persistent insomnia     Solitary pulmonary nodule on lung CT      Past Surgical History:   Procedure Laterality Date    ADENOIDECTOMY      CARDIAC SURGERY      HEMORRHOIDECTOMY      OTHER SURGICAL HISTORY      PTCA: DIAGNOSTIC CATH CORONARY DOMINANCE    OTHER SURGICAL HISTORY      TONSILLECTOMY WITH ADENOIDECTOMY    TONSILLECTOMY        General Information       Row Name 24 1310          OT Time and Intention    Document Type evaluation;therapy note (daily note)  -LE     Mode of Treatment individual therapy;occupational therapy  -LE       Row Name 24  1310          General Information    Patient Profile Reviewed yes  -LE     Prior Level of Function independent:;transfer;ADL's  -LE     Existing Precautions/Restrictions fall  CIWA, seizure  -LE     Barriers to Rehab --  ETOH  -LE       Row Name 09/05/24 1310          Living Environment    People in Home alone  -LE       Row Name 09/05/24 1310          Cognition    Orientation Status (Cognition) oriented x 4  -LE       Row Name 09/05/24 1310          Safety Issues, Functional Mobility    Safety Issues Affecting Function (Mobility) insight into deficits/self-awareness;judgment;problem-solving  -LE     Impairments Affecting Function (Mobility) balance;endurance/activity tolerance  -LE     Comment, Safety Issues/Impairments (Mobility) non skid socks and gait belt worn.  -LE               User Key  (r) = Recorded By, (t) = Taken By, (c) = Cosigned By      Initials Name Provider Type    Marysol Cuevas OTR Occupational Therapist                     Mobility/ADL's       Row Name 09/05/24 1311          Bed Mobility    Bed Mobility supine-sit;sit-supine;scooting/bridging  -LE     Supine-Sit Henry (Bed Mobility) standby assist  -LE     Sit-Supine [place] (Bed Mobility) standby assist  -LE       Row Name 09/05/24 1311          Transfers    Transfers sit-stand transfer;stand-sit transfer;bed-chair transfer;toilet transfer  -LE       Row Name 09/05/24 1311          Sit-Stand Transfer    Sit-Stand Henry (Transfers) standby assist  -LE       Row Name 09/05/24 1311          Stand-Sit Transfer    Stand-Sit Henry (Transfers) standby assist  -LE       Row Name 09/05/24 1311          Toilet Transfer    Type (Toilet Transfer) stand pivot/stand step  -LE     Henry Level (Toilet Transfer) standby assist  -LE       Row Name 09/05/24 1311          Functional Mobility    Functional Mobility- Ind. Level standby assist  -LE     Functional Mobility- Comment bed to bathroom to sink to bed.  -LE       Row Name  09/05/24 1311          Activities of Daily Living    BADL Assessment/Intervention toileting;feeding;grooming;lower body dressing;upper body dressing;bathing  -[facility] Name 09/05/24 1311          Lower Body Dressing Assessment/Training    [place] Level (Lower Body Dressing) doff;don;socks;set up;standby assist  -LE       [facility] Name 09/05/24 1311          Toileting Assessment/Training    Comment, (Toileting) did not void  -[facility] Name 09/05/24 1311          Grooming Assessment/Training    [place] Level (Grooming) oral care regimen;wash face, hands;standby assist;set up;verbal cues  -LE     Position (Grooming) unsupported standing;sink side  -LE     Comment, (Grooming) walks away from sink with water running.  -LE               User Key  (r) = Recorded By, (t) = Taken By, (c) = Cosigned By      Initials Name Provider Type    Marysol Cuevas OTR Occupational Therapist                   Obj/Interventions       [facility] Name 09/05/24 1312          Range of Motion Comprehensive    General Range of Motion bilateral upper extremity ROM WFL  -[facility] Name 09/05/24 1312          Strength Comprehensive (MMT)    Comment, General Manual Muscle Testing (MMT) Assessment B  4/5.  -LE       Row Name 09/05/24 1312          Balance    Balance Assessment sitting static balance;standing static balance;standing dynamic balance  -LE     Static Sitting Balance independent  -LE     Static Standing Balance standby assist  -LE     Dynamic Standing Balance standby assist  -LE               User Key  (r) = Recorded By, (t) = Taken By, (c) = Cosigned By      Initials Name Provider Type    Marysol Cuevas OTR Occupational Therapist                   Goals/Plan       Row Name 09/05/24 1313          Transfer Goal 1 (OT)    Activity/Assistive Device (Transfer Goal 1, OT) sit-to-stand/stand-to-sit;bed-to-chair/chair-to-bed;toilet  -LE     [place] Level/Cues Needed (Transfer Goal 1, OT) independent  -LE     Time Frame  (Transfer Goal 1, OT) 2 weeks  -LE     Progress/Outcome (Transfer Goal 1, OT) goal ongoing  -LE       [facility] Name 09/05/24 1313          Dressing Goal 1 (OT)    Activity/Device (Dressing Goal 1, OT) upper body dressing;lower body dressing  -LE     [place]/Cues Needed (Dressing Goal 1, OT) independent  -LE     Time Frame (Dressing Goal 1, OT) 2 weeks  -LE     Progress/Outcome (Dressing Goal 1, OT) goal ongoing  -[facility] Name 09/05/24 1313          Toileting Goal 1 (OT)    Activity/Device (Toileting Goal 1, OT) toileting skills, all;commode, 3-in-1;grab bar/safety frame  -LE     [place] Level/Cues Needed (Toileting Goal 1, OT) independent  -LE     Time Frame (Toileting Goal 1, OT) 2 weeks  -LE     Progress/Outcome (Toileting Goal 1, OT) goal ongoing  -[facility] Name 09/05/24 1313          Problem Specific Goal 1 (OT)    Problem Specific Goal 1 (OT) Ind ambulation to/from bathroom with walker.  -LE     Time Frame (Problem Specific Goal 1, OT) 2 weeks  -LE     Progress/Outcome (Problem Specific Goal 1, OT) goal ongoing  -LE       Row Name 09/05/24 1313          Therapy Assessment/Plan (OT)    Planned Therapy Interventions (OT) activity tolerance training;adaptive equipment training;BADL retraining;functional balance retraining;patient/caregiver education/training;strengthening exercise;transfer/mobility retraining  -LE               User Key  (r) = Recorded By, (t) = Taken By, (c) = Cosigned By      Initials Name Provider Type    Marysol Cuevas OTR Occupational Therapist                   Clinical Impression       Row Name 09/05/24 1312          Pain Assessment    Pretreatment Pain Rating 0/10 - no pain  -LE       Row Name 09/05/24 1312          Plan of Care Review    Plan of Care Reviewed With patient  -LE     Outcome Evaluation Pt admit with chest pain. History includes ETOH abuse, cardioversion, multiple admissions. Pt lives alone. Pt presents asleep in bed.  Pt is SBA to move OOB, neeraj shoes, walk  to bathroom.  Pt brushes teeth at sink and walks away from sink with water running.  No overt LOB but pt demo weaving on feet.   Will cont to follow for skilled OT for continued activity tolerance and safety/independence.  -PATY       Row Name 09/05/24 1312          Therapy Assessment/Plan (OT)    Patient/Family Therapy Goal Statement (OT) Return to prior level of function.  -LE     Rehab Potential (OT) fair, will monitor progress closely  -LE     Criteria for Skilled Therapeutic Interventions Met (OT) meets criteria;yes  -LE     Therapy Frequency (OT) 3 times/wk  -LE       Row Name 09/05/24 1312          Therapy Plan Review/Discharge Plan (OT)    Equipment Needs Upon Discharge (OT) --  has a walker at home.  -LE     Anticipated Discharge Disposition (OT) home with supervision  -PATY       [facility] Name 09/05/24 1312          Vital Signs    O2 Delivery Pre Treatment room air  -[facility] Name 09/05/24 1312          Positioning and Restraints    Pre-Treatment Position in bed  -LE     Post Treatment Position bed  -LE     In Bed notified nsg;fowlers;call light within reach;encouraged to call for assist;exit alarm on  -LE               User Key  (r) = Recorded By, (t) = Taken By, (c) = Cosigned By      Initials Name Provider Type    Marysol Cuevas OTR Occupational Therapist                   Outcome Measures       Row Name 09/05/24 1314          How much help from another is currently needed...    Putting on and taking off regular lower body clothing? 3  -LE     Bathing (including washing, rinsing, and drying) 3  -LE     Toileting (which includes using toilet bed pan or urinal) 3  -LE     Putting on and taking off regular upper body clothing 3  -LE     Taking care of personal grooming (such as brushing teeth) 3  -LE     Eating meals 4  -LE     AM-PAC 6 Clicks Score (OT) 19  -LE       Row Name 09/05/24 0943          How much help from another person do you currently need...    Turning from your back to your side while in flat  bed without using bedrails? 4  -MS     Moving from lying on back to sitting on the side of a flat bed without bedrails? 4  -MS     Moving to and from a bed to a chair (including a wheelchair)? 4  -MS     Standing up from a chair using your arms (e.g., wheelchair, bedside chair)? 3  -MS     Climbing 3-5 steps with a railing? 3  -MS     To walk in hospital room? 3  -MS     AM-PAC 6 Clicks Score (PT) 21  -MS     Highest Level of Mobility Goal 6 --> Walk 10 steps or more  -MS       Row Name 09/05/24 1314          Functional Assessment    Outcome Measure Options AM-PAC 6 Clicks Daily Activity (OT)  -PATY               User Key  (r) = Recorded By, (t) = Taken By, (c) = Cosigned By      Initials Name Provider Type    Marysol Cuevas, OTR Occupational Therapist    MS DaviBrittani, PT Physical Therapist                    Occupational Therapy Education       Title: PT OT SLP Therapies (In Progress)       Topic: Occupational Therapy (In Progress)       Point: ADL training (Done)       Description:   Instruct learner(s) on proper safety adaptation and remediation techniques during self care or transfers.   Instruct in proper use of assistive devices.                  Learning Progress Summary             Patient Acceptance, E, Bed IU by PATY at 9/5/2024 1314    Comment: role of OT, plan of care, fall risk, benefit of activity                         Point: Home exercise program (Not Started)       Description:   Instruct learner(s) on appropriate technique for monitoring, assisting and/or progressing therapeutic exercises/activities.                  Learner Progress:  Not documented in this visit.              Point: Precautions (Done)       Description:   Instruct learner(s) on prescribed precautions during self-care and functional transfers.                  Learning Progress Summary             Patient Acceptance, E, Bed IU by PATY at 9/5/2024 1314    Comment: role of OT, plan of care, fall risk, benefit of activity                          Point: Body mechanics (Done)       Description:   Instruct learner(s) on proper positioning and spine alignment during self-care, functional mobility activities and/or exercises.                  Learning Progress Summary             Patient Acceptance, E, Bed IU by PATY at 9/5/2024 1314    Comment: role of OT, plan of care, fall risk, benefit of activity                                         User Key       Initials Effective Dates Name Provider Type Discipline    PATY 06/16/21 -  Marysol Thomas, OTR Occupational Therapist OT                  OT Recommendation and Plan  Planned Therapy Interventions (OT): activity tolerance training, adaptive equipment training, BADL retraining, functional balance retraining, patient/caregiver education/training, strengthening exercise, transfer/mobility retraining  Therapy Frequency (OT): 3 times/wk  Plan of Care Review  Plan of Care Reviewed With: patient  Outcome Evaluation: Pt admit with chest pain. History includes ETOH abuse, cardioversion, multiple admissions. Pt lives alone. Pt presents asleep in bed.  Pt is SBA to move OOB, neeraj shoes, walk to bathroom.  Pt brushes teeth at sink and walks away from sink with water running.  No overt LOB but pt demo weaving on feet.   Will cont to follow for skilled OT for continued activity tolerance and safety/independence.     Time Calculation:   Evaluation Complexity (OT)  Review Occupational Profile/Medical/Therapy History Complexity: expanded/moderate complexity  Assessment, Occupational Performance/Identification of Deficit Complexity: 3-5 performance deficits  Clinical Decision Making Complexity (OT): detailed assessment/moderate complexity  Overall Complexity of Evaluation (OT): moderate complexity     Time Calculation- OT       Row Name 09/05/24 1315             Time Calculation- OT    OT Start Time 1020  -LE      OT Stop Time 1033  -LE      OT Time Calculation (min) 13 min  -LE      Total Timed Code Minutes- OT 8  minute(s)  -LE      OT Received On 09/05/24  -LE      OT - Next Appointment 09/09/24  -LE      OT Goal Re-Cert Due Date 09/19/24  -LE         Timed Charges    80081 - OT Self Care/Mgmt Minutes 8  -LE         Total Minutes    Timed Charges Total Minutes 8  -LE       Total Minutes 8  -LE                User Key  (r) = Recorded By, (t) = Taken By, (c) = Cosigned By      Initials Name Provider Type    Marysol Cuevas OTR Occupational Therapist                  Therapy Charges for Today       Code Description Service Date Service Provider Modifiers Qty    [id number]  OT SELF CARE/MGMT/TRAIN EA 15 MIN 9/5/2024 Marysol Thomas OTR GO 1    [id number]  OT EVAL MOD COMPLEXITY 2 9/5/2024 Marysol Thomas OTR GO 1                 GABRIEL Saunders  9/5/2024

## 2024-09-05 NOTE — CONSULTS
"  Access center consult.    Met with patient in room #408. Introduced self and role. Patient agreed to be evaluated.    Patient is a 65yo D/W/M. He is alert and oriented x3. Patient lives at home alone. [facility]: na. Children: 2 sons, 1 daughter. Occupation: disabled. Hobbies: home remodeling. Education: high school. Legal: DUI. : Reggie Doran/son: [phone]. : na. Support system: family. History of violence/trauma/abuse: denies. Patient feels safe in his home environment.     Access consulted for alcohol.  Patient presented to the ED 9/4/24 with c/o chest pain. He was admitted with hypokalemia. Past medical history alcoholism, A-Fib, depression, persistent insomnia, obstructive hypertrophic cardiomyopathy, EMMANUELLE. Access consult completed 8/11/24 for alcohol. He has been seen in the past by Dr. Helms, noted drug seeking behavior related to (ativan). Patient has been seen in the ED and multiple admissions related to alcohol intoxication/withdrawal. He reports past rehab and current with AA. He reports longest sobriety 1- 1 1/2 months. Patient reports having a \"temporary\" sponsor. He reports has been unable to attend AA the past 1 1/2 weeks due to illness. He reports last ETOH intake 9/2/24. Patient reports he drinks 5-10 shots of whiskey daily. He reports recent increase in ETOH consumption. Patient admits to having an alcohol problem. Patient denies past blackouts or memory loss. He has a history of seizures. Current alcohol craving 5/10. Last CIWA 2. No BAL available. He denies illicit drug use. Patient uses tobacco.     Throughout encounter patient is drowsy, requiring frequent redirection. Patient denies any past inpatient psychiatric care. He reports past counseling. He denies SI/HI/A/V/H. Denies wish to be dead. Sleep: decreased. Appetite: good. Depression: 5/10. Anxiety: 6/10. Current medications: zoloft, atarax. He reports having an outpatient psychiatrist referral to follow " up with for medication management. Patient reports interest in rehab but reports has been declined before due to his medical history. Patient accepted outpatient/inpatient [facility] resources. He reports planning to continue AA meetings.  He denies any questions at this time. Access will follow and available to further discuss [facility] treatment. Access available to provide counseling resources if indicated.

## 2024-09-05 NOTE — PROGRESS NOTES
"Patient Name: Javi Doran  :1960  64 y.o.      Patient Care Team:  Ginette Bryant MD as PCP - General (Family Medicine)    Interval History:   Heart rate and blood pressure controlled.    Subjective:  Following for history of obstructive cardiomyopathy.    Objective   Vital Signs  Temp:  [97.3 °F (36.3 °C)-97.9 °F (36.6 °C)] 97.4 °F (36.3 °C)  Heart Rate:  [67-73] 71  Resp:  [18] 18  BP: ()/(53-80) 107/75    Intake/Output Summary (Last 24 hours) at 2024 1232  Last data filed at 2024 0325  Gross per 24 hour   Intake 300 ml   Output 525 ml   Net -225 ml     Flowsheet Rows      Flowsheet Row First Filed Value   Admission Height 182.9 cm (72\") Documented at 2024   Admission Weight 77.1 kg (170 lb) Documented at 2024 043            Physical Exam:   General Appearance:  Sleepy and lethargic.   Lungs:     Clear to auscultation.  Normal respiratory effort and rate.      Heart:    Regular rhythm and normal rate, normal S1 and S2, no murmurs, gallops or rubs.     Chest Wall:    No abnormalities observed   Abdomen:     Soft, nontender, positive bowel sounds.     Extremities:   no cyanosis, clubbing or edema.  No marked joint deformities.       Results Review:    Results from last 7 days   Lab Units 24   SODIUM mmol/L 138   POTASSIUM mmol/L 3.4*   CHLORIDE mmol/L 101   CO2 mmol/L 28.2   BUN mg/dL 9   CREATININE mg/dL 1.29*   GLUCOSE mg/dL 99   CALCIUM mg/dL 8.5*     Results from last 7 days   Lab Units 24  0759 24   HSTROP T ng/L 45* 52*     Results from last 7 days   Lab Units 24   WBC 10*3/mm3 5.28   HEMOGLOBIN g/dL 9.8*   HEMATOCRIT % 30.5*   PLATELETS 10*3/mm3 159     Results from last 7 days   Lab Units 24   INR  1.27*   APTT seconds 31.3         Results from last 7 days   Lab Units 09/05/24  0447   MAGNESIUM mg/dL 2.5*             Medication Review:   arformoterol, 15 mcg, Nebulization, BID - RT   " And  tiotropium bromide monohydrate, 2 puff, Inhalation, Daily - RT  aspirin, 81 mg, Oral, Daily  atorvastatin, 80 mg, Oral, Daily  folic acid, 1 mg, Oral, Daily  metoprolol succinate XL, 12.5 mg, Oral, Q24H  pantoprazole, 40 mg, Oral, BID AC  rivaroxaban, 15 mg, Oral, Daily With Dinner  sertraline, 100 mg, Oral, Nightly  sodium chloride, 10 mL, Intravenous, Q12H  spironolactone, 25 mg, Oral, Daily  thiamine (B-1) IV, 200 mg, Intravenous, Q8H   Followed by  [START ON 9/9/2024] thiamine, 100 mg, Oral, Daily              Assessment & Plan     1.  Chest pain.  Troponin decreasing and chronically elevated.  Type II myocardial infarction secondary to chronic kidney disease.  2.  Coronary artery disease.  Heart cath in 2023 showed 50 to 60% stenosis and patent stents.  Medical therapy was recommended.Ejection fraction 54% by echo in August 2024.  3.  Hypertrophic cardiomyopathy status post myectomy  4.  Mitral regurgitation status post mitral valve repair.  Echocardiogram pending to reassess mitral regurgitation.  5.  Atrial fibrillation.  On metoprolol and rivaroxaban.  6.  Alcohol and tobacco use.  7.  Hypokalemia.    -Waiting for repeat echocardiogram.  -Resume Aldactone which will help with potassium.  Continue to hold furosemide.    Celeste Olivarez MD, [facility]  09/05/24  12:32 EDT

## 2024-09-06 ENCOUNTER — APPOINTMENT (OUTPATIENT)
Dept: CARDIOLOGY | Facility: HOSPITAL | Age: 64
DRG: 281 | End: 2024-09-06
Payer: MEDICARE

## 2024-09-06 PROBLEM — F10.10 ALCOHOL ABUSE: Status: ACTIVE | Noted: 2024-09-06

## 2024-09-06 LAB
AORTIC DIMENSIONLESS INDEX: 0.8 (DI)
ASCENDING AORTA: 3.7 CM
BH CV ECHO MEAS - ACS: 1.93 CM
BH CV ECHO MEAS - AI P1/2T: 258.8 MSEC
BH CV ECHO MEAS - AO MAX PG: 3.8 MMHG
BH CV ECHO MEAS - AO MEAN PG: 1.88 MMHG
BH CV ECHO MEAS - AO ROOT DIAM: 2.9 CM
BH CV ECHO MEAS - AO V2 MAX: 97.3 CM/SEC
BH CV ECHO MEAS - AO V2 VTI: 16.5 CM
BH CV ECHO MEAS - EDV(CUBED): 161.2 ML
BH CV ECHO MEAS - EDV(MOD-SP2): 166 ML
BH CV ECHO MEAS - EDV(MOD-SP4): 165 ML
BH CV ECHO MEAS - EF(MOD-BP): 43.6 %
BH CV ECHO MEAS - EF(MOD-SP2): 47 %
BH CV ECHO MEAS - EF(MOD-SP4): 41.2 %
BH CV ECHO MEAS - ESV(CUBED): 92.2 ML
BH CV ECHO MEAS - ESV(MOD-SP2): 88 ML
BH CV ECHO MEAS - ESV(MOD-SP4): 97 ML
BH CV ECHO MEAS - FS: 17 %
BH CV ECHO MEAS - IVS/LVPW: 1.36 CM
BH CV ECHO MEAS - IVSD: 1.44 CM
BH CV ECHO MEAS - LAT PEAK E' VEL: 12.7 CM/SEC
BH CV ECHO MEAS - LV DIASTOLIC VOL/BSA (35-75): 86.3 CM2
BH CV ECHO MEAS - LV MASS(C)D: 284.1 GRAMS
BH CV ECHO MEAS - LV MAX PG: 2.9 MMHG
BH CV ECHO MEAS - LV MEAN PG: 1.29 MMHG
BH CV ECHO MEAS - LV SYSTOLIC VOL/BSA (12-30): 50.7 CM2
BH CV ECHO MEAS - LV V1 MAX: 85.7 CM/SEC
BH CV ECHO MEAS - LV V1 VTI: 14 CM
BH CV ECHO MEAS - LVIDD: 5.4 CM
BH CV ECHO MEAS - LVIDS: 4.5 CM
BH CV ECHO MEAS - LVPWD: 1.06 CM
BH CV ECHO MEAS - MED PEAK E' VEL: 4.1 CM/SEC
BH CV ECHO MEAS - MR MAX PG: 63.8 MMHG
BH CV ECHO MEAS - MR MAX VEL: 399.3 CM/SEC
BH CV ECHO MEAS - MV DEC SLOPE: 264.4 CM/SEC2
BH CV ECHO MEAS - MV DEC TIME: 0.29 SEC
BH CV ECHO MEAS - MV E MAX VEL: 79.7 CM/SEC
BH CV ECHO MEAS - MV MAX PG: 2.6 MMHG
BH CV ECHO MEAS - MV MEAN PG: 1 MMHG
BH CV ECHO MEAS - MV P1/2T: 91.7 MSEC
BH CV ECHO MEAS - MV V2 VTI: 20 CM
BH CV ECHO MEAS - MVA(P1/2T): 2.4 CM2
BH CV ECHO MEAS - PA ACC TIME: 0.12 SEC
BH CV ECHO MEAS - PA V2 MAX: 78.7 CM/SEC
BH CV ECHO MEAS - RAP SYSTOLE: 8 MMHG
BH CV ECHO MEAS - RV MAX PG: 1.09 MMHG
BH CV ECHO MEAS - RV V1 MAX: 52.3 CM/SEC
BH CV ECHO MEAS - RV V1 VTI: 8.8 CM
BH CV ECHO MEAS - RVSP: 20.3 MMHG
BH CV ECHO MEAS - SV(MOD-SP2): 78 ML
BH CV ECHO MEAS - SV(MOD-SP4): 68 ML
BH CV ECHO MEAS - SVI(MOD-SP2): 40.8 ML/M2
BH CV ECHO MEAS - SVI(MOD-SP4): 35.6 ML/M2
BH CV ECHO MEAS - TR MAX PG: 17.3 MMHG
BH CV ECHO MEAS - TR MAX VEL: 207.7 CM/SEC
BH CV ECHO MEASUREMENTS AVERAGE E/E' RATIO: 9.49
BH CV XLRA - TDI S': 5.4 CM/SEC
LEFT ATRIUM VOLUME INDEX: 47.9 ML/M2
SINUS: 3.2 CM
STJ: 3.1 CM

## 2024-09-06 PROCEDURE — 93306 TTE W/DOPPLER COMPLETE: CPT

## 2024-09-06 PROCEDURE — 99232 SBSQ HOSP IP/OBS MODERATE 35: CPT | Performed by: NURSE PRACTITIONER

## 2024-09-06 PROCEDURE — 94761 N-INVAS EAR/PLS OXIMETRY MLT: CPT

## 2024-09-06 PROCEDURE — 94664 DEMO&/EVAL PT USE INHALER: CPT

## 2024-09-06 PROCEDURE — 93306 TTE W/DOPPLER COMPLETE: CPT | Performed by: INTERNAL MEDICINE

## 2024-09-06 PROCEDURE — 25510000001 PERFLUTREN 6.52 MG/ML SUSPENSION 2 ML VIAL: Performed by: STUDENT IN AN ORGANIZED HEALTH CARE EDUCATION/TRAINING PROGRAM

## 2024-09-06 PROCEDURE — 94799 UNLISTED PULMONARY SVC/PX: CPT

## 2024-09-06 PROCEDURE — 25010000002 THIAMINE PER 100 MG: Performed by: STUDENT IN AN ORGANIZED HEALTH CARE EDUCATION/TRAINING PROGRAM

## 2024-09-06 RX ORDER — BUSPIRONE HYDROCHLORIDE 15 MG/1
15 TABLET ORAL 3 TIMES DAILY
Status: DISCONTINUED | OUTPATIENT
Start: 2024-09-06 | End: 2024-09-07 | Stop reason: HOSPADM

## 2024-09-06 RX ORDER — LORAZEPAM 1 MG/1
1 TABLET ORAL EVERY 8 HOURS PRN
Status: DISCONTINUED | OUTPATIENT
Start: 2024-09-06 | End: 2024-09-07 | Stop reason: HOSPADM

## 2024-09-06 RX ADMIN — Medication 10 ML: at 08:57

## 2024-09-06 RX ADMIN — Medication 10 ML: at 20:45

## 2024-09-06 RX ADMIN — SPIRONOLACTONE 25 MG: 25 TABLET, FILM COATED ORAL at 08:57

## 2024-09-06 RX ADMIN — LORAZEPAM 1 MG: 1 TABLET ORAL at 20:44

## 2024-09-06 RX ADMIN — METOPROLOL SUCCINATE 12.5 MG: 25 TABLET, EXTENDED RELEASE ORAL at 08:57

## 2024-09-06 RX ADMIN — LORAZEPAM 1 MG: 1 TABLET ORAL at 01:25

## 2024-09-06 RX ADMIN — BUSPIRONE HYDROCHLORIDE 15 MG: 15 TABLET ORAL at 13:08

## 2024-09-06 RX ADMIN — PERFLUTREN 2 ML: 6.52 INJECTION, SUSPENSION INTRAVENOUS at 07:50

## 2024-09-06 RX ADMIN — FOLIC ACID 1 MG: 1 TABLET ORAL at 08:57

## 2024-09-06 RX ADMIN — BUSPIRONE HYDROCHLORIDE 15 MG: 15 TABLET ORAL at 20:43

## 2024-09-06 RX ADMIN — LORAZEPAM 1 MG: 1 TABLET ORAL at 06:34

## 2024-09-06 RX ADMIN — ASPIRIN 81 MG: 81 TABLET, COATED ORAL at 08:57

## 2024-09-06 RX ADMIN — RIVAROXABAN 15 MG: 15 TABLET, FILM COATED ORAL at 16:59

## 2024-09-06 RX ADMIN — THIAMINE HYDROCHLORIDE 200 MG: 100 INJECTION, SOLUTION INTRAMUSCULAR; INTRAVENOUS at 06:34

## 2024-09-06 RX ADMIN — PANTOPRAZOLE SODIUM 40 MG: 40 TABLET, DELAYED RELEASE ORAL at 08:57

## 2024-09-06 RX ADMIN — ATORVASTATIN CALCIUM 80 MG: 80 TABLET, FILM COATED ORAL at 08:57

## 2024-09-06 RX ADMIN — PANTOPRAZOLE SODIUM 40 MG: 40 TABLET, DELAYED RELEASE ORAL at 16:58

## 2024-09-06 RX ADMIN — ARFORMOTEROL TARTRATE 15 MCG: 15 SOLUTION RESPIRATORY (INHALATION) at 20:01

## 2024-09-06 RX ADMIN — ARFORMOTEROL TARTRATE 15 MCG: 15 SOLUTION RESPIRATORY (INHALATION) at 09:48

## 2024-09-06 RX ADMIN — SERTRALINE 100 MG: 100 TABLET, FILM COATED ORAL at 20:44

## 2024-09-06 RX ADMIN — BUSPIRONE HYDROCHLORIDE 15 MG: 15 TABLET ORAL at 16:57

## 2024-09-06 NOTE — CASE MANAGEMENT/SOCIAL WORK
Discharge Planning Assessment  [facility]     Patient Name: Javi Doran  MRN: [id number]  Today's Date: 9/6/2024    Admit Date: 9/4/2024    Plan: Home, family transport or pt may need Cab voucher.   Discharge Needs Assessment       Row Name 09/06/24 1222       Living Environment    People in Home alone    Current Living Arrangements home    Able to Return to Prior Arrangements yes       Transition Planning    Patient/Family Anticipates Transition to home    Patient/Family Anticipated Services at Transition     Transportation Anticipated car, drives self;family or friend will provide       Discharge Needs Assessment    Readmission Within the Last 30 Days no previous admission in last 30 days    Equipment Currently Used at Home oxygen    Concerns to be Addressed discharge planning    Anticipated Changes Related to Illness none    Equipment Needed After Discharge none    Outpatient/Agency/Support Group Needs homecare agency    Discharge Facility/Level of Care Needs home with home health                   Discharge Plan       Row Name 09/06/24 1222       Plan    Plan Home, family transport or pt may need Cab voucher.    Patient/Family in Agreement with Plan yes    Plan Comments CCP met with pt at bedside, introduced self and role of CCP. Face sheet information and pharmacy verified. Pt lives in a single-story home alone. Pt still drives, IADL's, and has an oxygen concentrator at home but does not use it. Pt denies having a living will. Pt is enrolled in meds to beds and denies trouble affording medications. Pt declines HH or SNF history. CCP discussed PT/OT recs for HH at CT. Pt declines HH referrals at this time. CT plan is home, family transport or pt may need cab voucher home. Uday RUIZ/CCP                  Continued Care and Services - Admitted Since 9/4/2024    No active coordination exists for this encounter.       Expected Discharge Date and Time       Expected Discharge Date Expected  Discharge Time    Sep 6, 2024            Demographic Summary    No documentation.                  Functional Status       Row Name 09/06/24 1221       Functional Status    Usual Activity Tolerance moderate    Current Activity Tolerance moderate       Assessment of Health Literacy    How often do you have someone help you read hospital materials? Occasionally    How often do you have problems learning about your medical condition because of difficulty understanding written information? Occasionally    How often do you have a problem understanding what is told to you about your medical condition? Occasionally    How confident are you filling out medical forms by yourself? Quite a bit    Health Literacy Good       Functional Status, IADL    Medications independent    Meal Preparation independent    Housekeeping independent    Laundry independent    Shopping independent                               Lilliam Brush RN

## 2024-09-06 NOTE — PROGRESS NOTES
"    DAILY PROGRESS NOTE  [facility]    Patient Identification:  Name: Javi Doran  Age: 64 y.o.  Sex: male  :  1960  MRN: [id number]         Primary Care Physician: Ginette Bryant MD    Subjective:  Interval History: No confusion.  He is oriented x 3.  Denies any hallucination.  Admits to anxieties and feels sleepy.  He was resting comfortably upon entering room.  He does not feel tremulous and I cannot appreciate any tremor on exam.  Speech is fluent    Objective: Conversational and otherwise pleasant.  No family at bedside    Scheduled Meds:arformoterol, 15 mcg, Nebulization, BID - RT   And  tiotropium bromide monohydrate, 2 puff, Inhalation, Daily - RT  aspirin, 81 mg, Oral, Daily  atorvastatin, 80 mg, Oral, Daily  busPIRone, 15 mg, Oral, TID  folic acid, 1 mg, Oral, Daily  metoprolol succinate XL, 12.5 mg, Oral, Q24H  pantoprazole, 40 mg, Oral, BID AC  rivaroxaban, 15 mg, Oral, Daily With Dinner  sertraline, 100 mg, Oral, Nightly  sodium chloride, 10 mL, Intravenous, Q12H  spironolactone, 25 mg, Oral, Daily  thiamine (B-1) IV, 200 mg, Intravenous, Q8H   Followed by  [START ON 2024] thiamine, 100 mg, Oral, Daily      Continuous Infusions:     Vital signs in last 24 hours:  Temp:  [97.5 °F (36.4 °C)-97.7 °F (36.5 °C)] 97.7 °F (36.5 °C)  Heart Rate:  [67-70] 67  Resp:  [18] 18  BP: (104-114)/(79-88) 110/82    Intake/Output:  No intake or output data in the 24 hours ending 24 1021    Exam:  /82   Pulse 67   Temp 97.7 °F (36.5 °C) (Oral)   Resp 18   Ht 182.9 cm (72\")   Wt 70.3 kg (155 lb)   SpO2 93%   BMI 21.02 kg/m²     General Appearance:    Alert, cooperative, AAOx3, chronically disheveled                         Throat:   Oral mucosa pink and moist                           Neck:   No JVD                         Lungs:    Clear to auscultation bilaterally, respirations unlabored                 Chest Wall:    No tenderness or deformity       "                    Heart:    Regular rate and rhythm, S1 and S2 normal                  Abdomen:     Soft, nontender, bowel sounds active                 Extremities: Generalized weakness though moving all, no pitting edema                        pulses:   Pulses palpable in all extremities                            Skin:   Skin is warm and dry, nonjaundiced                  neurologic:   CNII-XII intact, no focal deficits noted or tremor     Data Review:  Labs in chart were reviewed.    Assessment:  Active Hospital Problems    Diagnosis  POA    **Alcohol abuse [F10.10]  Unknown    Hypokalemia [E87.6]  Yes    Benzodiazepine misuse [F13.90]  Yes    Essential hypertension [I10]  Yes    Gastroesophageal reflux disease [K21.9]  Yes    Chronic kidney disease [N18.9]  Yes      Resolved Hospital Problems   No resolved problems to display.       Plan:    I came back today with the intentions to discharge this patient home but after review of MAR, he is requiring way more Ativan the need be.  I feel CIWA is very subjective and he is utilizing this for his anxieties.  I do not feel any alcoholic DTs are present at this time.  CIWA discontinued.  Ativan written 1 mg p.o. Q8 as needed for a total of 2 doses today with BuSpar increased to 15 mg and scheduled 3 times daily.  This patient is using the Ativan to treat his anxiety's and he needs to stop has no benzos will be prescribed post DC   -Past history is of benzodiazepine abuse       Electrolyte imbalances replaced -recheck in a.m.    Lasix remains on hold and he remains euvolemic and his blood pressure remains soft.  Resume pending trends    Chest pain with chronic elevated troponins per cardiology with type II MI secondary to CKD   -Echo noted   -Continue ASA statin metoprolol and Xarelto and Sparano    CKD 3a progressing towards B -creatinine 1.29    GERD-PPI      PT/OT following-CCP for DC needs/SNF    Christiano Purcell MD  9/6/2024  10:21 EDT

## 2024-09-06 NOTE — PROGRESS NOTES
"    Patient Name: Javi Doran  :1960  64 y.o.      Patient Care Team:  Ginette Bryant MD as PCP - General (Family Medicine)    Chief Complaint: follow up obstructive cardiomyopathy    Interval History: lying flat without orthopnea. No cardiac complaints. No chest pain.        Objective   Vital Signs  Temp:  [97.5 °F (36.4 °C)-97.7 °F (36.5 °C)] 97.7 °F (36.5 °C)  Heart Rate:  [67-70] 67  Resp:  [18] 18  BP: (104-114)/(79-88) 110/82  No intake or output data in the 24 hours ending 24 09  Flowsheet Rows      Flowsheet Row First Filed Value   Admission Height 182.9 cm (72\") Documented at 2024   Admission Weight 77.1 kg (170 lb) Documented at 2024            Physical Exam:   General Appearance:    Alert, cooperative, in no acute distress   Lungs:     Clear to auscultation.  Normal respiratory effort and rate.      Heart:    Regular rhythm and normal rate, normal S1 and S2, no murmurs, gallops or rubs.     Chest Wall:    No abnormalities observed   Abdomen:     Soft, nontender, positive bowel sounds.     Extremities:   no cyanosis, clubbing or edema.  No marked joint deformities.  Adequate musculoskeletal strength.       Results Review:    Results from last 7 days   Lab Units 24  1823 24   SODIUM mmol/L  --  138   POTASSIUM mmol/L 4.7 3.4*   CHLORIDE mmol/L  --  101   CO2 mmol/L  --  28.2   BUN mg/dL  --  9   CREATININE mg/dL  --  1.29*   GLUCOSE mg/dL  --  99   CALCIUM mg/dL  --  8.5*     Results from last 7 days   Lab Units 24  0759 24   HSTROP T ng/L 45* 52*     Results from last 7 days   Lab Units 24   WBC 10*3/mm3 5.28   HEMOGLOBIN g/dL 9.8*   HEMATOCRIT % 30.5*   PLATELETS 10*3/mm3 159     Results from last 7 days   Lab Units 24   INR  1.27*   APTT seconds 31.3     Results from last 7 days   Lab Units 24  0447   MAGNESIUM mg/dL 2.5*                   Medication Review:   arformoterol, " 15 mcg, Nebulization, BID - RT   And  tiotropium bromide monohydrate, 2 puff, Inhalation, Daily - RT  aspirin, 81 mg, Oral, Daily  atorvastatin, 80 mg, Oral, Daily  folic acid, 1 mg, Oral, Daily  metoprolol succinate XL, 12.5 mg, Oral, Q24H  pantoprazole, 40 mg, Oral, BID AC  rivaroxaban, 15 mg, Oral, Daily With Dinner  sertraline, 100 mg, Oral, Nightly  sodium chloride, 10 mL, Intravenous, Q12H  spironolactone, 25 mg, Oral, Daily  thiamine (B-1) IV, 200 mg, Intravenous, Q8H   Followed by  [START ON 9/9/2024] thiamine, 100 mg, Oral, Daily       Echo     Left ventricular systolic function is mildly decreased. Calculated left ventricular EF = 43.6%.  Hypokinetic septum present.  Basal inferior wall is hypokinetic.    Left ventricular wall thickness is consistent with moderate concentric hypertrophy.    Right ventricle not well-visualized but appears dilated with reduced systolic function.    Left ventricular diastolic function is consistent with (grade I) impaired relaxation.    There is severe biatrial enlargement  present.    There is mild to moderate mitral valve regurgitation present.    IVC measurement consistent with elevated right atrial filling pressure.       Assessment & Plan   Chest pain, troponin decreasing and chronically elevated. Likely elevated due to renal insufficiency.  Coronary artery disease, cor angiography in 2023 showed EF 50-60% prox LAD stenosis (not hemodynamically significant by FFR)  with patent circ and RCA stents. Medical therapy was recommended.  Hypertrophic cardiomyopathy status post myectomy   Mitral valve regurgitation, previous MV repair  Paroxysmal atrial fibrillation on rivaroxaban and metoprolol.   Alcohol and tobacco anuse   Hypokalemia , improved.  Chronic systolic heart failure, fluctuating EFs over the years. Anywhere from 33 to 54%.   Echo this admission with EF 40-45%. Chest pain has resolved and no real anginal features. Continue medical therapy.     Volume status appears  stable. Hold off on restarting loop diuretic. May do ok with spironolactone alone. Could consider SGLT2-I.     Needs close follow up with primary cards -- he plans on following with Dr. Ellis.     LELA Hernandez  [facility]  09/06/24  09:23 EDT

## 2024-09-06 NOTE — NURSING NOTE
Potassium 4.7 on redraw. PO ativan 1 mg given for CIWA 8. Possibly home today. VSS, Aox4. Drowsy most of the time.

## 2024-09-07 ENCOUNTER — READMISSION MANAGEMENT (OUTPATIENT)
Dept: CALL CENTER | Facility: HOSPITAL | Age: 64
End: 2024-09-07
Payer: MEDICARE

## 2024-09-07 VITALS
HEART RATE: 74 BPM | TEMPERATURE: 97.7 F | SYSTOLIC BLOOD PRESSURE: 118 MMHG | DIASTOLIC BLOOD PRESSURE: 83 MMHG | BODY MASS INDEX: 22.04 KG/M2 | HEIGHT: 72 IN | WEIGHT: 162.7 LBS | RESPIRATION RATE: 18 BRPM | OXYGEN SATURATION: 99 %

## 2024-09-07 PROBLEM — I34.0 MITRAL VALVE REGURGITATION: Status: ACTIVE | Noted: 2024-09-07

## 2024-09-07 PROBLEM — I50.42 CHRONIC COMBINED SYSTOLIC AND DIASTOLIC HEART FAILURE: Status: ACTIVE | Noted: 2024-09-07

## 2024-09-07 PROBLEM — I48.0 PAROXYSMAL ATRIAL FIBRILLATION: Status: ACTIVE | Noted: 2024-09-07

## 2024-09-07 PROBLEM — I42.2 HYPERTROPHIC CARDIOMYOPATHY: Status: ACTIVE | Noted: 2024-09-07

## 2024-09-07 PROBLEM — R07.9 CHEST PAIN: Status: ACTIVE | Noted: 2024-09-07

## 2024-09-07 PROBLEM — R79.89 ELEVATED TROPONIN: Status: ACTIVE | Noted: 2024-09-07

## 2024-09-07 PROBLEM — E83.42 HYPOMAGNESEMIA: Status: ACTIVE | Noted: 2024-09-07

## 2024-09-07 PROBLEM — I25.10 CAD (CORONARY ARTERY DISEASE): Status: ACTIVE | Noted: 2024-09-07

## 2024-09-07 LAB
ALBUMIN SERPL-MCNC: 3.3 G/DL (ref 3.5–5.2)
ALBUMIN/GLOB SERPL: 1.2 G/DL
ALP SERPL-CCNC: 145 U/L (ref 39–117)
ALT SERPL W P-5'-P-CCNC: 8 U/L (ref 1–41)
ANION GAP SERPL CALCULATED.3IONS-SCNC: 9.6 MMOL/L (ref 5–15)
AST SERPL-CCNC: 19 U/L (ref 1–40)
BILIRUB SERPL-MCNC: 1.1 MG/DL (ref 0–1.2)
BUN SERPL-MCNC: 13 MG/DL (ref 8–23)
BUN/CREAT SERPL: 10.7 (ref 7–25)
CALCIUM SPEC-SCNC: 8.6 MG/DL (ref 8.6–10.5)
CHLORIDE SERPL-SCNC: 105 MMOL/L (ref 98–107)
CO2 SERPL-SCNC: 25.4 MMOL/L (ref 22–29)
CREAT SERPL-MCNC: 1.21 MG/DL (ref 0.76–1.27)
DEPRECATED RDW RBC AUTO: 54.8 FL (ref 37–54)
EGFRCR SERPLBLD CKD-EPI 2021: 67.3 ML/MIN/1.73
ERYTHROCYTE [DISTWIDTH] IN BLOOD BY AUTOMATED COUNT: 17.6 % (ref 12.3–15.4)
GLOBULIN UR ELPH-MCNC: 2.7 GM/DL
GLUCOSE SERPL-MCNC: 78 MG/DL (ref 65–99)
HCT VFR BLD AUTO: 32.2 % (ref 37.5–51)
HGB BLD-MCNC: 10.2 G/DL (ref 13–17.7)
MAGNESIUM SERPL-MCNC: 1.7 MG/DL (ref 1.6–2.4)
MCH RBC QN AUTO: 27.2 PG (ref 26.6–33)
MCHC RBC AUTO-ENTMCNC: 31.7 G/DL (ref 31.5–35.7)
MCV RBC AUTO: 85.9 FL (ref 79–97)
PLATELET # BLD AUTO: 149 10*3/MM3 (ref 140–450)
PMV BLD AUTO: 9.2 FL (ref 6–12)
POTASSIUM SERPL-SCNC: 3.9 MMOL/L (ref 3.5–5.2)
PROT SERPL-MCNC: 6 G/DL (ref 6–8.5)
RBC # BLD AUTO: 3.75 10*6/MM3 (ref 4.14–5.8)
SODIUM SERPL-SCNC: 140 MMOL/L (ref 136–145)
WBC NRBC COR # BLD AUTO: 7.12 10*3/MM3 (ref 3.4–10.8)

## 2024-09-07 PROCEDURE — 85027 COMPLETE CBC AUTOMATED: CPT | Performed by: HOSPITALIST

## 2024-09-07 PROCEDURE — 94799 UNLISTED PULMONARY SVC/PX: CPT

## 2024-09-07 PROCEDURE — 83735 ASSAY OF MAGNESIUM: CPT | Performed by: HOSPITALIST

## 2024-09-07 PROCEDURE — 94761 N-INVAS EAR/PLS OXIMETRY MLT: CPT

## 2024-09-07 PROCEDURE — 99232 SBSQ HOSP IP/OBS MODERATE 35: CPT | Performed by: NURSE PRACTITIONER

## 2024-09-07 PROCEDURE — 80053 COMPREHEN METABOLIC PANEL: CPT | Performed by: HOSPITALIST

## 2024-09-07 RX ORDER — BUSPIRONE HYDROCHLORIDE 15 MG/1
15 TABLET ORAL 3 TIMES DAILY PRN
Qty: 30 TABLET | Refills: 0 | Status: SHIPPED | OUTPATIENT
Start: 2024-09-07

## 2024-09-07 RX ORDER — ASPIRIN 81 MG/1
81 TABLET ORAL DAILY
Qty: 30 TABLET | Refills: 0 | Status: SHIPPED | OUTPATIENT
Start: 2024-09-08

## 2024-09-07 RX ORDER — METOPROLOL SUCCINATE 25 MG/1
12.5 TABLET, EXTENDED RELEASE ORAL
Qty: 30 TABLET | Refills: 0 | Status: SHIPPED | OUTPATIENT
Start: 2024-09-08

## 2024-09-07 RX ORDER — PANTOPRAZOLE SODIUM 40 MG/1
40 TABLET, DELAYED RELEASE ORAL
Qty: 60 TABLET | Refills: 0 | Status: SHIPPED | OUTPATIENT
Start: 2024-09-07

## 2024-09-07 RX ADMIN — ARFORMOTEROL TARTRATE 15 MCG: 15 SOLUTION RESPIRATORY (INHALATION) at 08:35

## 2024-09-07 RX ADMIN — ASPIRIN 81 MG: 81 TABLET, COATED ORAL at 08:49

## 2024-09-07 RX ADMIN — TIOTROPIUM BROMIDE INHALATION SPRAY 2 PUFF: 3.12 SPRAY, METERED RESPIRATORY (INHALATION) at 08:36

## 2024-09-07 RX ADMIN — FOLIC ACID 1 MG: 1 TABLET ORAL at 08:49

## 2024-09-07 RX ADMIN — Medication 100 MG: at 08:49

## 2024-09-07 RX ADMIN — BUSPIRONE HYDROCHLORIDE 15 MG: 15 TABLET ORAL at 08:49

## 2024-09-07 RX ADMIN — ATORVASTATIN CALCIUM 80 MG: 80 TABLET, FILM COATED ORAL at 08:49

## 2024-09-07 RX ADMIN — Medication 10 ML: at 08:50

## 2024-09-07 RX ADMIN — METOPROLOL SUCCINATE 12.5 MG: 25 TABLET, EXTENDED RELEASE ORAL at 08:49

## 2024-09-07 RX ADMIN — PANTOPRAZOLE SODIUM 40 MG: 40 TABLET, DELAYED RELEASE ORAL at 06:49

## 2024-09-07 RX ADMIN — SPIRONOLACTONE 25 MG: 25 TABLET, FILM COATED ORAL at 08:50

## 2024-09-07 NOTE — DISCHARGE SUMMARY
"    Patient Name: Javi Doran  : 1960  MRN: [id number]    Date of Admission: 2024  Date of Discharge:  2024  Primary Care Physician: Ginette Bryant MD      Chief Complaint:   Chest Pain      Discharge Diagnoses     Active Hospital Problems    Diagnosis  POA    **Alcohol abuse [F10.10]  Yes    Hypomagnesemia [E83.42]  Yes    CAD (coronary artery disease) [I25.10]  Yes    Mitral valve regurgitation [I34.0]  Yes    Hypertrophic cardiomyopathy [I42.2]  Yes    Chest pain [R07.9]  Yes    Elevated troponin [R79.89]  Yes    Paroxysmal atrial fibrillation [I48.0]  Yes    Chronic combined systolic and diastolic heart failure [I50.42]  Yes    Hypokalemia [E87.6]  Yes    Benzodiazepine misuse [F13.90]  Yes    Essential hypertension [I10]  Yes    Gastroesophageal reflux disease [K21.9]  Yes    Stage 2 chronic kidney disease [N18.2]  Yes      Resolved Hospital Problems   No resolved problems to display.        Hospital Course     Mr. Doran is a 64 y.o. male with a history of chronic combined systolic and diastolic heart failure, hypertrophic cardiomyopathy s/p myectomy, MVR s/p MV repair, paroxysmal atrial fibrillation on Xarelto, CAD, alcohol use, CKD stage 2, GERD who presented to [facility] initially complaining of chest pain.  Please see the admitting history and physical for further details.  He was admitted to the hospital for further evaluation and treatment.     Chest pain, elevated troponin  Chronic combined systolic and diastolic heart failure  Hypertrophic cardiomyopathy  MVR s/p MV repair  Paroxysmal atrial fibrillation on Xarelto  - Cardiology consulted and followed during hospital stay. Per cardiology notes: \"angiography in  showed EF 50-60% prox LAD stenosis (not hemodynamically significant by FFR)  with patent circ and RCA stents. Medical therapy was recommended.\" Imaging showing pleural effusions, 2D Echocardiogram showing Left ventricular systolic " function is mildly decreased LV systolic function, EF = 43.6%.  Hypokinetic septum present.  Basal inferior wall is hypokinetic.Left ventricular wall thickness is consistent with moderate concentric hypertrophy.Right ventricle not well-visualized but appears dilated with reduced systolic function. Left ventricular diastolic function is consistent with (grade I) impaired relaxation. Severe biatrial enlargement  present, mild to moderate mitral valve regurgitation present. Troponin felt to be chronically elevated, likely in part from renal dysfunction. He was treated with ASA, statin, BB, Spironolactone, Xarelto, tolerated well. Diuretic had to be held due to renal dysfunction and soft blood pressures.  Cardiology evaluated prior to discharge, they stated that patient's volume status appeared stable, they recommended continuation of holding loop diuretic at this time, but okay to continue other medications as prescribed.  ACE/ARB not started by Cardiology due to blood pressure per review of documentation. They also made note that initiation of SGLT2 inhibitor could be considered in the future.  They subsequently cleared patient for discharge from cardiac perspective, no further testing indicated at this time, patient plans to follow-up with Dr. Ellis after discharge, recommend that he follow-up within 1 to 2 weeks after discharge for reassessment. GDMT addressed and deferred to Cardiology, defer further decisions surrounding GDMT to primary cardiologist. Patient provided discharge instructions on weight monitoring and when to return to hospital. Recommend low sodium diet.  Strongly encourage medication compliance. Recommend that patient check blood pressure 2-3 times daily and record those values in log book and take it to next PCP visit to allow for greater insight into treatment efficacy to guide further management decisions. Recommend heart healthy/low sodium diet.    Alcohol use with concern for  "withdrawal  Anxiety/depression  - Patient treated with CIWA, thiamine, folic acid during hospital stay and closely monitored. Also, had home dose of BuSpar increased to TID, which helped symptoms as well.  On day of discharge, patient was resting comfortably, he denied any evidence of withdrawal symptoms, he was no longer requiring Ativan and felt like he was stable to go home.  Strongly encourage ongoing alcohol cessation after discharge.  Can continue increased dose of BuSpar, however, patient will need close follow-up with his PCP within a week after discharge to reassess symptoms  to guide ongoing medication management.    Hypomagnesemia  Hypokalemia  - Values noted to be low during hospital stay, repleted per protocol and subsequently improved.  Values normalized on most recent testing prior to discharge.  Recommend repeat level check with PCP within 1 week after discharge.    CKD 2  - Creatinine fluctuated during hospital stay, 1.59 on admission, peaked at 2.02, diuretic had to be held, subsequent creatinine values have improved, 1.21 on most recent labs prior to discharge, which appears to be closer to prior baseline values. Cardiology recommending continuation of holding diuretic, but okay to continue Aldactone at discharge. Given that he will be discharged on this medication, strongly recommend close outpatient lab monitoring. Recommend repeat BMP with PCP within 1 week for reassessment.    GERD  - Continue PPI as prescribed.     Mediastinal adenopathy  - Noted on CT scan, \"as before, Attention on follow-up with chest   CT in 8 weeks is recommended to ensure stability and for resolution and   exclude any possibility of neoplasm.\" Strongly recommend attention to this by PCP with ordering of CT scan per radiology recommendations, will defer management to them.        Day of Discharge     Subjective:  Patient seen and examined this morning.  Hospital day 3.  He is awake and alert, resting in bed, has not " required further Ativan since scheduled doses yesterday, he denies withdrawal symptoms.  He has been evaluated and cleared for discharge by cardiology.    Physical Exam:  Temp:  [97.4 °F (36.3 °C)-98.1 °F (36.7 °C)] 98.1 °F (36.7 °C)  Heart Rate:  [67-83] 73  Resp:  [18-20] 20  BP: (105-121)/(64-87) 121/87  Body mass index is 22.07 kg/m².  Physical Exam  Vitals and nursing note reviewed.   Constitutional:       General: He is awake. He is not in acute distress.     Comments: Chronically ill-appearing   HENT:      Head: Atraumatic.   Cardiovascular:      Rate and Rhythm: Normal rate and regular rhythm.      Pulses: Normal pulses.      Heart sounds: Normal heart sounds.   Pulmonary:      Effort: Pulmonary effort is normal. No respiratory distress.      Breath sounds: Normal breath sounds.   Abdominal:      Palpations: Abdomen is soft.      Tenderness: There is no abdominal tenderness.   Skin:     General: Skin is warm and dry.   Neurological:      Mental Status: He is alert and oriented to person, place, and time.   Psychiatric:         Behavior: Behavior is cooperative.         Consultants     Consult Orders (all) (From admission, onward)       Start     Ordered    09/04/24 2258  Inpatient Access Center Consult  Once        Provider:  (Not yet assigned)    09/04/24 2257 09/04/24 1226  Inpatient Cardiology Consult  Once        Specialty:  Cardiology  Provider:  Dandy Kilpatrick MD    09/04/24 1225    09/04/24 0609  A (on-call MD unless specified) Details  Once        Specialty:  Hospitalist  Provider:  (Not yet assigned)    09/04/24 0608                  Procedures     * Surgery not found *    Imaging Results (All)       Procedure Component Value Units Date/Time    XR Chest 1 View [[id number]] Collected: 09/04/24 0524     Updated: 09/04/24 0529    Narrative:      SINGLE VIEW OF THE CHEST     HISTORY: Chest pain and nausea     COMPARISON: August 17, 2024     FINDINGS:  There is cardiomegaly. Patient is status  post median sternotomy. Old  left-sided rib fractures are seen. There may be some vascular  congestion. No pneumothorax is seen. There is no definite vascular  congestion. There is bibasilar consolidation. Small bilateral effusions  are suspected.       Impression:      Nonspecific bibasilar consolidation.     This report was finalized on 9/4/2024 5:26 AM by Dr. Kyung Garcia M.D on Workstation: [id number]               Results for orders placed during the hospital encounter of 09/04/24    Adult Transthoracic Echo Complete W/ Cont if Necessary Per Protocol    Interpretation Summary    Left ventricular systolic function is mildly decreased. Calculated left ventricular EF = 43.6%.  Hypokinetic septum present.  Basal inferior wall is hypokinetic.    Left ventricular wall thickness is consistent with moderate concentric hypertrophy.    Right ventricle not well-visualized but appears dilated with reduced systolic function.    Left ventricular diastolic function is consistent with (grade I) impaired relaxation.    There is severe biatrial enlargement  present.    There is mild to moderate mitral valve regurgitation present.    IVC measurement consistent with elevated right atrial filling pressure.    Pertinent Labs     Results from last 7 days   Lab Units 09/07/24 0254 09/05/24 0447 09/04/24  0459   WBC 10*3/mm3 7.12 5.28 5.81   HEMOGLOBIN g/dL 10.2* 9.8* 10.6*   PLATELETS 10*3/mm3 149 159 197     Results from last 7 days   Lab Units 09/07/24  0254 09/05/24  1823 09/05/24 0447 09/04/24  1301 09/04/24  0459   SODIUM mmol/L 140  --  138  --  137   POTASSIUM mmol/L 3.9 4.7 3.4* 2.6* 2.3*   CHLORIDE mmol/L 105  --  101  --  94*   CO2 mmol/L 25.4  --  28.2  --  29.0   BUN mg/dL 13  --  9  --  6*   CREATININE mg/dL 1.21  --  1.29*  --  1.36*   GLUCOSE mg/dL 78  --  99  --  109*   EGFR mL/min/1.73 67.3  --  62.3  --  58.5*     Results from last 7 days   Lab Units 09/07/24  0254 09/05/24 0447 09/04/24  0459   ALBUMIN  "g/dL 3.3* 3.4* 4.1   BILIRUBIN mg/dL 1.1 1.2 1.2   ALK PHOS U/L 145* 156* 193*   AST (SGOT) U/L 19 25 29   ALT (SGPT) U/L 8 12 16     Results from last 7 days   Lab Units 09/07/24  0254 09/05/24  0447 09/04/24  0459   CALCIUM mg/dL 8.6 8.5* 8.6   ALBUMIN g/dL 3.3* 3.4* 4.1   MAGNESIUM mg/dL 1.7 2.5* 1.5*   PHOSPHORUS mg/dL  --  2.8  --        Results from last 7 days   Lab Units 09/04/24  0759 09/04/24  0459   HSTROP T ng/L 45* 52*   PROBNP pg/mL  --  3,936.0*           Invalid input(s): \"LDLCALC\"      Results from last 7 days   Lab Units 09/04/24  0547   COVID19  Not Detected       Test Results Pending at Discharge       Discharge Details        Discharge Medications        New Medications        Instructions Start Date   aspirin 81 MG EC tablet   81 mg, Oral, Daily   Start Date: September 8, 2024            Changes to Medications        Instructions Start Date   busPIRone 15 MG tablet  Commonly known as: BUSPAR  What changed: when to take this   15 mg, Oral, 3 Times Daily PRN, Has not used in the past 6 months but has if needed             Continue These Medications        Instructions Start Date   albuterol sulfate  (90 Base) MCG/ACT inhaler  Commonly known as: PROVENTIL HFA;VENTOLIN HFA;PROAIR HFA   2 puffs, Inhalation, Every 4 Hours PRN      Anoro Ellipta 62.5-25 MCG/ACT aerosol powder  inhaler  Generic drug: Umeclidinium-Vilanterol   1 puff, Inhalation, Daily - RT      atorvastatin 80 MG tablet  Commonly known as: LIPITOR   80 mg, Oral, Nightly      hydrOXYzine pamoate 25 MG capsule  Commonly known as: VISTARIL   25 mg, Oral, 3 Times Daily PRN      metoprolol succinate XL 25 MG 24 hr tablet  Commonly known as: TOPROL-XL   12.5 mg, Oral, Every 24 Hours Scheduled   Start Date: September 8, 2024     pantoprazole 40 MG EC tablet  Commonly known as: PROTONIX   40 mg, Oral, 2 Times Daily Before Meals      rivaroxaban 15 MG tablet  Commonly known as: XARELTO   15 mg, Oral, Daily With Dinner      sertraline 100 " MG tablet  Commonly known as: ZOLOFT   100 mg, Oral, Nightly      spironolactone 25 MG tablet  Commonly known as: ALDACTONE   25 mg, Oral, Daily             Stop These Medications      furosemide 20 MG tablet  Commonly known as: LASIX     potassium chloride 10 MEQ CR tablet              No Known Allergies    Discharge Disposition:  Home or Self Care      Discharge Diet:  Diet Order   Procedures    Diet: Regular/House; Fluid Consistency: Thin (IDDSI 0)       Discharge Activity:   Activity Instructions       Gradually Increase Activity Until at Pre-Hospitalization Level              CODE STATUS:    Code Status and Medical Interventions: CPR (Attempt to Resuscitate); Full Support   Ordered at: 09/04/24 0715     Level Of Support Discussed With:    Patient     Code Status (Patient has no pulse and is not breathing):    CPR (Attempt to Resuscitate)     Medical Interventions (Patient has pulse or is breathing):    Full Support       No future appointments.  Additional Instructions for the Follow-ups that You Need to Schedule       Discharge Follow-up with PCP   As directed       Currently Documented PCP:    Ginette Bryant MD    PCP Phone Number:    [phone]     Follow Up Details: within 1 week for reassessment        Discharge Follow-up with Specialty: cardiology   As directed      Specialty: cardiology   Follow Up Details: within 1 week for reassessment, please call to schedule appointment               Follow-up Information       Ginette Bryant MD .    Specialty: Family Medicine  Why: within 1 week for reassessment  Contact information:  [address] [facility] [address] [phone]                             Additional Instructions for the Follow-ups that You Need to Schedule       Discharge Follow-up with PCP   As directed       Currently Documented PCP:    Ginette Bryant MD    PCP Phone Number:    [phone]     Follow Up Details: within 1  week for reassessment        Discharge Follow-up with Specialty: cardiology   As directed      Specialty: cardiology   Follow Up Details: within 1 week for reassessment, please call to schedule appointment            Time Spent on Discharge:  Greater than 30 minutes      Tai Rodriguez DO  [facility]  09/07/24  08:56 EDT

## 2024-09-07 NOTE — PROGRESS NOTES
"[facility]    Patient Name: Javi Doran  :1960  64 y.o.  LOS: 1  Encounter Provider: LELA Iqbal      Patient Care Team:  Ginette Bryant MD as PCP - General (Family Medicine)    Chief Complaint: Follow-up obstructive cardiomyopathy    Interval History: Denies dyspnea, euvolemic       Objective   Vital Signs  Temp:  [97.4 °F (36.3 °C)-97.7 °F (36.5 °C)] 97.7 °F (36.5 °C)  Heart Rate:  [67-77] 75  Resp:  [18] 18  BP: (105-120)/(64-82) 120/81    Intake/Output Summary (Last 24 hours) at 2024  Last data filed at 2024  Gross per 24 hour   Intake 0 ml   Output 200 ml   Net -200 ml     Flowsheet Rows      Flowsheet Row First Filed Value   Admission Height 182.9 cm (72\") Documented at 2024   Admission Weight 77.1 kg (170 lb) Documented at 2024              Constitutional:       Appearance: Normal appearance. Well-developed.   Eyes:      Conjunctiva/sclera: Conjunctivae normal.   Neck:      Vascular: No carotid bruit.   Pulmonary:      Effort: Pulmonary effort is normal.      Breath sounds: Normal breath sounds.   Cardiovascular:      Normal rate. Regular rhythm. Normal S1. Normal S2.       Murmurs: There is no murmur.      No gallop.  No click. No rub.   Edema:     Peripheral edema absent.   Musculoskeletal: Normal range of motion. Skin:     General: Skin is warm and dry.   Neurological:      Mental Status: Alert and oriented to person, place, and time.      GCS: GCS eye subscore is 4. GCS verbal subscore is 5. GCS motor subscore is 6.   Psychiatric:         Speech: Speech normal.         Behavior: Behavior normal.         Thought Content: Thought content normal.         Judgment: Judgment normal.           Pertinent Test Results:  Results from last 7 days   Lab Units 24  0254 24  1823 24  0447 24  1301 24  0459   SODIUM mmol/L 140  --  138  --  137   POTASSIUM mmol/L 3.9 4.7 3.4* 2.6* " "2.3*   CHLORIDE mmol/L 105  --  101  --  94*   CO2 mmol/L 25.4  --  28.2  --  29.0   BUN mg/dL 13  --  9  --  6*   CREATININE mg/dL 1.21  --  1.29*  --  1.36*   GLUCOSE mg/dL 78  --  99  --  109*   CALCIUM mg/dL 8.6  --  8.5*  --  8.6   AST (SGOT) U/L 19  --  25  --  29   ALT (SGPT) U/L 8  --  12  --  16     Results from last 7 days   Lab Units 09/04/24  0759 09/04/24 0459   HSTROP T ng/L 45* 52*     Results from last 7 days   Lab Units 09/07/24 0254 09/05/24 0447 09/04/24 0459   WBC 10*3/mm3 7.12 5.28 5.81   HEMOGLOBIN g/dL 10.2* 9.8* 10.6*   HEMATOCRIT % 32.2* 30.5* 32.7*   PLATELETS 10*3/mm3 149 159 197     Results from last 7 days   Lab Units 09/04/24 0459   INR  1.27*   APTT seconds 31.3     Results from last 7 days   Lab Units 09/07/24 0254 09/05/24 0447 09/04/24 0459   MAGNESIUM mg/dL 1.7 2.5* 1.5*           Invalid input(s): \"LDLCALC\"  Results from last 7 days   Lab Units 09/04/24 0459   PROBNP pg/mL 3,936.0*               Medication Review:   arformoterol, 15 mcg, Nebulization, BID - RT   And  tiotropium bromide monohydrate, 2 puff, Inhalation, Daily - RT  aspirin, 81 mg, Oral, Daily  atorvastatin, 80 mg, Oral, Daily  busPIRone, 15 mg, Oral, TID  folic acid, 1 mg, Oral, Daily  metoprolol succinate XL, 12.5 mg, Oral, Q24H  pantoprazole, 40 mg, Oral, BID AC  rivaroxaban, 15 mg, Oral, Daily With Dinner  sertraline, 100 mg, Oral, Nightly  sodium chloride, 10 mL, Intravenous, Q12H  spironolactone, 25 mg, Oral, Daily  thiamine, 100 mg, Oral, Daily              Assessment & Plan     Active Hospital Problems    Diagnosis  POA    **Alcohol abuse [F10.10]  Unknown    Hypokalemia [E87.6]  Yes    Benzodiazepine misuse [F13.90]  Yes    Essential hypertension [I10]  Yes    Gastroesophageal reflux disease [K21.9]  Yes    Chronic kidney disease [N18.9]  Yes      Resolved Hospital Problems   No resolved problems to display.        Chest pain  Troponin decreasing and chronically elevated secondary to renal " insufficiency  CAD  Coronary angiography 2023 revealed 50-60% proximal LAD stenosis not hemodynamically significant by FFR, patent circumflex and RCA stents   Continue medical management  Hypertrophic cardiomyopathy status post myectomy  Mitral valve regurgitation with previous MV repair  PAF  Alcohol and tobacco abuse  Hypokalemia  Chronic HFrEF  LVEF fluctuating over the years, LVEF 40-45%  Volume status stable  No loop diuretic at this point  Consider SGLT2 inhibitor  Continue spironolactone    Plans to follow with Dr. Ellis.  Stable for discharge from cardiovascular standpoint.           LELA Iqbal  [facility]   [facility]  [address] [place] [address]  Office: [phone]    09/07/24  07:26 EDT

## 2024-09-07 NOTE — PLAN OF CARE
Problem: Adult Inpatient Plan of Care  Goal: Plan of Care Review  Outcome: Met  Flowsheets (Taken 9/7/2024 1352)  Progress: improving  Goal: Patient-Specific Goal (Individualized)  Outcome: Met  Goal: Absence of Hospital-Acquired Illness or Injury  Outcome: Met  Intervention: Identify and Manage Fall Risk  Recent Flowsheet Documentation  Taken 9/7/2024 1200 by Kerri Zuniga RN  Safety Promotion/Fall Prevention:   safety round/check completed   activity supervised   assistive device/personal items within reach   clutter free environment maintained   fall prevention program maintained   nonskid shoes/slippers when out of bed  Taken 9/7/2024 0800 by Kerri Zungia RN  Safety Promotion/Fall Prevention: safety round/check completed  Intervention: Prevent Skin Injury  Recent Flowsheet Documentation  Taken 9/7/2024 1200 by Kerri Zuniga RN  Body Position: position changed independently  Taken 9/7/2024 0800 by Kerri Zuniga RN  Body Position: position changed independently  Intervention: Prevent and Manage VTE (Venous Thromboembolism) Risk  Recent Flowsheet Documentation  Taken 9/7/2024 1200 by Kerri Zuniga RN  Activity Management: activity encouraged  Taken 9/7/2024 0800 by Kerri Zuniga RN  Activity Management: activity encouraged  Intervention: Prevent Infection  Recent Flowsheet Documentation  Taken 9/7/2024 1200 by Kerri Zuniga RN  Infection Prevention: single patient room provided  Taken 9/7/2024 0800 by Kerri Zuniga RN  Infection Prevention: single patient room provided  Goal: Optimal Comfort and Wellbeing  Outcome: Met  Intervention: Provide Person-Centered Care  Recent Flowsheet Documentation  Taken 9/7/2024 0800 by Kerri Zuniga RN  Trust Relationship/Rapport:   care explained   choices provided   emotional support provided   empathic listening provided   questions answered   questions encouraged   reassurance provided   thoughts/feelings acknowledged  Goal: Readiness for Transition of  Care  Outcome: Met  Goal: Plan of Care Review  Outcome: Met  Flowsheets (Taken 9/7/2024 1352)  Progress: improving  Goal: Patient-Specific Goal (Individualized)  Outcome: Met  Goal: Absence of Hospital-Acquired Illness or Injury  Outcome: Met  Intervention: Identify and Manage Fall Risk  Recent Flowsheet Documentation  Taken 9/7/2024 1200 by Kerri Zuniga RN  Safety Promotion/Fall Prevention:   safety round/check completed   activity supervised   assistive device/personal items within reach   clutter free environment maintained   fall prevention program maintained   nonskid shoes/slippers when out of bed  Taken 9/7/2024 0800 by Kerri Zuniga RN  Safety Promotion/Fall Prevention: safety round/check completed  Intervention: Prevent Skin Injury  Recent Flowsheet Documentation  Taken 9/7/2024 1200 by Kerri Zuniga RN  Body Position: position changed independently  Taken 9/7/2024 0800 by Kerri Zuniga RN  Body Position: position changed independently  Intervention: Prevent and Manage VTE (Venous Thromboembolism) Risk  Recent Flowsheet Documentation  Taken 9/7/2024 1200 by Kerri Zuniga RN  Activity Management: activity encouraged  Taken 9/7/2024 0800 by Kerri Zuniga RN  Activity Management: activity encouraged  Intervention: Prevent Infection  Recent Flowsheet Documentation  Taken 9/7/2024 1200 by Kerri Zuniga RN  Infection Prevention: single patient room provided  Taken 9/7/2024 0800 by Kerri Zuniga RN  Infection Prevention: single patient room provided  Goal: Optimal Comfort and Wellbeing  Outcome: Met  Intervention: Provide Person-Centered Care  Recent Flowsheet Documentation  Taken 9/7/2024 0800 by Kerri Zuniga RN  Trust Relationship/Rapport:   care explained   choices provided   emotional support provided   empathic listening provided   questions answered   questions encouraged   reassurance provided   thoughts/feelings acknowledged  Goal: Readiness for Transition of Care  Outcome: Met      Problem: Alcohol Withdrawal  Goal: Alcohol Withdrawal Symptom Control  Outcome: Met     Problem: Acute Neurologic Deterioration (Alcohol Withdrawal)  Goal: Optimal Neurologic Function  Outcome: Met  Intervention: Minimize or Manage Acute Neurologic Symptoms  Recent Flowsheet Documentation  Taken 9/7/2024 0800 by Kerri Zuniga RN  Cerebral Perfusion Promotion: blood pressure monitored     Problem: Substance Misuse (Alcohol Withdrawal)  Goal: Readiness for Change Identified  Outcome: Met  Intervention: Partner to Facilitate Behavior Change  Recent Flowsheet Documentation  Taken 9/7/2024 0800 by Kerri Zuniga RN  Supportive Measures: active listening utilized  Intervention: Promote Psychosocial Wellbeing  Recent Flowsheet Documentation  Taken 9/7/2024 0800 by Kerri Zuniga RN  Family/Support System Care: support provided     Problem: Dysrhythmia  Goal: Normalized Cardiac Rhythm  Outcome: Met     Problem: Adjustment to Illness (Heart Failure)  Goal: Optimal Coping  Outcome: Met  Intervention: Support Psychosocial Response  Recent Flowsheet Documentation  Taken 9/7/2024 0800 by Kerri Zuniga RN  Supportive Measures: active listening utilized  Family/Support System Care: support provided     Problem: Cardiac Output Decreased (Heart Failure)  Goal: Optimal Cardiac Output  Outcome: Met  Intervention: Optimize Cardiac Output  Recent Flowsheet Documentation  Taken 9/7/2024 0800 by Kerri Zuniga RN  Environmental Support: calm environment promoted     Problem: Dysrhythmia (Heart Failure)  Goal: Stable Heart Rate and Rhythm  Outcome: Met     Problem: Fluid Imbalance (Heart Failure)  Goal: Fluid Balance  Outcome: Met     Problem: Functional Ability Impaired (Heart Failure)  Goal: Optimal Functional Ability  Outcome: Met  Intervention: Optimize Functional Ability  Recent Flowsheet Documentation  Taken 9/7/2024 1200 by Kerri Zuniga RN  Activity Management: activity encouraged  Taken 9/7/2024 0800 by Kerri Zuniga  RN  Activity Management: activity encouraged     Problem: Oral Intake Inadequate (Heart Failure)  Goal: Optimal Nutrition Intake  Outcome: Met     Problem: Respiratory Compromise (Heart Failure)  Goal: Effective Oxygenation and Ventilation  Outcome: Met  Intervention: Promote Airway Secretion Clearance  Recent Flowsheet Documentation  Taken 9/7/2024 0800 by Kerri Zuniga RN  Cough And Deep Breathing: done independently per patient     Problem: Sleep Disordered Breathing (Heart Failure)  Goal: Effective Breathing Pattern During Sleep  Outcome: Met     Problem: Electrolyte Imbalance  Goal: Electrolyte Balance  Outcome: Met     Problem: Heart Failure Comorbidity  Goal: Maintenance of Heart Failure Symptom Control  Outcome: Met  Intervention: Maintain Heart Failure-Management  Recent Flowsheet Documentation  Taken 9/7/2024 1200 by Kerri Zuniga RN  Medication Review/Management: medications reviewed  Taken 9/7/2024 0800 by Kerri Zuniga RN  Medication Review/Management: medications reviewed     Problem: Hypertension Comorbidity  Goal: Blood Pressure in Desired Range  Outcome: Met  Intervention: Maintain Blood Pressure Management  Recent Flowsheet Documentation  Taken 9/7/2024 1200 by Kerri Zuniga RN  Medication Review/Management: medications reviewed  Taken 9/7/2024 0800 by Kerri Zuniga RN  Medication Review/Management: medications reviewed     Problem: Skin Injury Risk Increased  Goal: Skin Health and Integrity  Outcome: Met  Intervention: Optimize Skin Protection  Recent Flowsheet Documentation  Taken 9/7/2024 1200 by Kerri Zuniga RN  Head of Bed (HOB) Positioning: HOB at 30-45 degrees  Taken 9/7/2024 0800 by Kerri Zuniga RN  Head of Bed (HOB) Positioning: HOB at 30-45 degrees   Goal Outcome Evaluation:           Progress: improving

## 2024-09-08 NOTE — CASE MANAGEMENT/SOCIAL WORK
Case Management Discharge Note      Final Note: home via cab voucher         Selected Continued Care - Discharged on 9/7/2024 Admission date: 9/4/2024 - Discharge disposition: Home or Self Care      Destination    No services have been selected for the patient.                Durable Medical Equipment    No services have been selected for the patient.                Dialysis/Infusion    No services have been selected for the patient.                Home Medical Care    No services have been selected for the patient.                Therapy    No services have been selected for the patient.                Community Resources    No services have been selected for the patient.                Community & DME    No services have been selected for the patient.                    Transportation Services  Taxi: [employer]    Final Discharge Disposition Code: 01 - home or self-care

## 2024-09-08 NOTE — OUTREACH NOTE
Prep Survey      Flowsheet Row Responses   [facility] facility patient discharged from? [facility]   Is LACE score < 7 ? No   Eligibility Readm Mgmt   Discharge diagnosis alcohol abuse   Does the patient have one of the following disease processes/diagnoses(primary or secondary)? Other   Does the patient have Home health ordered? No   Is there a DME ordered? No   Prep survey completed? Yes            BEBE RODRIGUEZ - Registered Nurse

## 2024-09-11 ENCOUNTER — READMISSION MANAGEMENT (OUTPATIENT)
Dept: CALL CENTER | Facility: HOSPITAL | Age: 64
End: 2024-09-11
Payer: MEDICARE

## 2024-09-11 NOTE — OUTREACH NOTE
Medical Week 1 Survey      Flowsheet Row Responses   [facility] patient discharged from? [place]   Does the patient have one of the following disease processes/diagnoses(primary or secondary)? Other   Week 1 attempt successful? No   Unsuccessful attempts Attempt 1            Katina BUTLER - Licensed Nurse

## 2024-09-13 NOTE — PROGRESS NOTES
Enter Query Response Below      Query Response: Noncardiac chest pain with a type II myocardial infarction secondary to chronic kidney disease.    Electronically signed by Celeste Olivarez MD, 24, 11:14 AM EDT.               If applicable, please update the problem list.       Patient: Javi Doran        : 1960  Account: [id number]           Admit Date: 2024        How to Respond to this query:       a. Click New Note     b. Answer query within the yellow box.                c. Update the Problem List, if applicable.      If you have any questions about this query contact me at: [email or web address]     Dr. Olivarez,    This query was sent to Dr. Rodriguez who deferred to cardiology.  Patient presented on  with complaints of chest pain.  Patient was treated with IV fluids and discharged with new medication aspirin.  Discharge summary includes plan for patient to stop taking furosemide and potassium chloride.    Please provide the etiology of patient's chest pain.                By submitting this query, we are merely seeking further clarification of documentation to accurately reflect all conditions that you are monitoring, evaluating, treating or that extend the hospitalization or utilize additional resources of care. Please utilize your independent clinical judgment when addressing the question(s) above.     This query and your response, once completed, will be entered into the legal medical record.    Sincerely,  Lisbeth Ash RN CCDS [facility]  Clinical Documentation Integrity Program

## 2024-09-16 ENCOUNTER — READMISSION MANAGEMENT (OUTPATIENT)
Dept: CALL CENTER | Facility: HOSPITAL | Age: 64
End: 2024-09-16
Payer: MEDICARE

## 2024-11-19 ENCOUNTER — LAB REQUISITION (OUTPATIENT)
Dept: LAB | Facility: HOSPITAL | Age: 64
End: 2024-11-19
Payer: MEDICARE

## 2024-11-19 DIAGNOSIS — I48.0 PAROXYSMAL ATRIAL FIBRILLATION: ICD-10-CM

## 2024-11-19 DIAGNOSIS — I50.9 HEART FAILURE, UNSPECIFIED: ICD-10-CM

## 2024-11-19 DIAGNOSIS — I50.84 END STAGE HEART FAILURE: ICD-10-CM

## 2024-11-19 DIAGNOSIS — I48.91 UNSPECIFIED ATRIAL FIBRILLATION: ICD-10-CM

## 2024-11-19 LAB
ALBUMIN SERPL-MCNC: 4.1 G/DL (ref 3.5–5.2)
ALBUMIN/GLOB SERPL: 1.2 G/DL
ALP SERPL-CCNC: 83 U/L (ref 39–117)
ALT SERPL W P-5'-P-CCNC: 13 U/L (ref 1–41)
ANION GAP SERPL CALCULATED.3IONS-SCNC: 13 MMOL/L (ref 5–15)
AST SERPL-CCNC: 34 U/L (ref 1–40)
BASOPHILS # BLD AUTO: 0.06 10*3/MM3 (ref 0–0.2)
BASOPHILS NFR BLD AUTO: 0.7 % (ref 0–1.5)
BILIRUB SERPL-MCNC: 0.6 MG/DL (ref 0–1.2)
BUN SERPL-MCNC: 14 MG/DL (ref 8–23)
BUN/CREAT SERPL: 12.2 (ref 7–25)
CALCIUM SPEC-SCNC: 9.2 MG/DL (ref 8.6–10.5)
CHLORIDE SERPL-SCNC: 96 MMOL/L (ref 98–107)
CO2 SERPL-SCNC: 25 MMOL/L (ref 22–29)
CREAT SERPL-MCNC: 1.15 MG/DL (ref 0.76–1.27)
DEPRECATED RDW RBC AUTO: 54.5 FL (ref 37–54)
EGFRCR SERPLBLD CKD-EPI 2021: 71.5 ML/MIN/1.73
EOSINOPHIL # BLD AUTO: 0.04 10*3/MM3 (ref 0–0.4)
EOSINOPHIL NFR BLD AUTO: 0.4 % (ref 0.3–6.2)
ERYTHROCYTE [DISTWIDTH] IN BLOOD BY AUTOMATED COUNT: 17 % (ref 12.3–15.4)
GLOBULIN UR ELPH-MCNC: 3.3 GM/DL
GLUCOSE SERPL-MCNC: 80 MG/DL (ref 65–99)
HCT VFR BLD AUTO: 29.2 % (ref 37.5–51)
HGB BLD-MCNC: 9.2 G/DL (ref 13–17.7)
IMM GRANULOCYTES # BLD AUTO: 0.03 10*3/MM3 (ref 0–0.05)
IMM GRANULOCYTES NFR BLD AUTO: 0.3 % (ref 0–0.5)
LYMPHOCYTES # BLD AUTO: 0.86 10*3/MM3 (ref 0.7–3.1)
LYMPHOCYTES NFR BLD AUTO: 9.4 % (ref 19.6–45.3)
MAGNESIUM SERPL-MCNC: 2 MG/DL (ref 1.6–2.4)
MCH RBC QN AUTO: 27.7 PG (ref 26.6–33)
MCHC RBC AUTO-ENTMCNC: 31.5 G/DL (ref 31.5–35.7)
MCV RBC AUTO: 88 FL (ref 79–97)
MONOCYTES # BLD AUTO: 0.53 10*3/MM3 (ref 0.1–0.9)
MONOCYTES NFR BLD AUTO: 5.8 % (ref 5–12)
NEUTROPHILS NFR BLD AUTO: 7.62 10*3/MM3 (ref 1.7–7)
NEUTROPHILS NFR BLD AUTO: 83.4 % (ref 42.7–76)
NT-PROBNP SERPL-MCNC: 2969 PG/ML (ref 0–900)
PLATELET # BLD AUTO: 210 10*3/MM3 (ref 140–450)
PMV BLD AUTO: 8.8 FL (ref 6–12)
POTASSIUM SERPL-SCNC: 4.1 MMOL/L (ref 3.5–5.2)
PROT SERPL-MCNC: 7.4 G/DL (ref 6–8.5)
RBC # BLD AUTO: 3.32 10*6/MM3 (ref 4.14–5.8)
SODIUM SERPL-SCNC: 134 MMOL/L (ref 136–145)
WBC NRBC COR # BLD AUTO: 9.14 10*3/MM3 (ref 3.4–10.8)

## 2024-11-19 PROCEDURE — 85025 COMPLETE CBC W/AUTO DIFF WBC: CPT | Performed by: INTERNAL MEDICINE

## 2024-11-19 PROCEDURE — 83880 ASSAY OF NATRIURETIC PEPTIDE: CPT | Performed by: INTERNAL MEDICINE

## 2024-11-19 PROCEDURE — 83735 ASSAY OF MAGNESIUM: CPT | Performed by: INTERNAL MEDICINE

## 2024-11-19 PROCEDURE — 80053 COMPREHEN METABOLIC PANEL: CPT | Performed by: INTERNAL MEDICINE

## 2024-11-26 ENCOUNTER — LAB REQUISITION (OUTPATIENT)
Dept: LAB | Facility: HOSPITAL | Age: 64
End: 2024-11-26
Payer: MEDICARE

## 2024-11-26 DIAGNOSIS — I50.9 HEART FAILURE, UNSPECIFIED: ICD-10-CM

## 2024-11-26 DIAGNOSIS — I11.0 HYPERTENSIVE HEART DISEASE WITH HEART FAILURE: ICD-10-CM

## 2024-11-26 DIAGNOSIS — I50.20 UNSPECIFIED SYSTOLIC (CONGESTIVE) HEART FAILURE: ICD-10-CM

## 2024-11-26 LAB
ALBUMIN SERPL-MCNC: 3.9 G/DL (ref 3.5–5.2)
ALBUMIN/GLOB SERPL: 1.2 G/DL
ALP SERPL-CCNC: 108 U/L (ref 39–117)
ALT SERPL W P-5'-P-CCNC: 10 U/L (ref 1–41)
ANION GAP SERPL CALCULATED.3IONS-SCNC: 17.1 MMOL/L (ref 5–15)
AST SERPL-CCNC: 29 U/L (ref 1–40)
BASOPHILS # BLD AUTO: 0.03 10*3/MM3 (ref 0–0.2)
BASOPHILS NFR BLD AUTO: 0.5 % (ref 0–1.5)
BILIRUB SERPL-MCNC: 0.5 MG/DL (ref 0–1.2)
BUN SERPL-MCNC: 10 MG/DL (ref 8–23)
BUN/CREAT SERPL: 12 (ref 7–25)
CALCIUM SPEC-SCNC: 8.4 MG/DL (ref 8.6–10.5)
CHLORIDE SERPL-SCNC: 103 MMOL/L (ref 98–107)
CO2 SERPL-SCNC: 18.9 MMOL/L (ref 22–29)
CREAT SERPL-MCNC: 0.83 MG/DL (ref 0.76–1.27)
DEPRECATED RDW RBC AUTO: 57.9 FL (ref 37–54)
EGFRCR SERPLBLD CKD-EPI 2021: 98.3 ML/MIN/1.73
EOSINOPHIL # BLD AUTO: 0.07 10*3/MM3 (ref 0–0.4)
EOSINOPHIL NFR BLD AUTO: 1.1 % (ref 0.3–6.2)
ERYTHROCYTE [DISTWIDTH] IN BLOOD BY AUTOMATED COUNT: 18.4 % (ref 12.3–15.4)
GLOBULIN UR ELPH-MCNC: 3.3 GM/DL
GLUCOSE SERPL-MCNC: 48 MG/DL (ref 65–99)
HCT VFR BLD AUTO: 31.2 % (ref 37.5–51)
HGB BLD-MCNC: 9.7 G/DL (ref 13–17.7)
IMM GRANULOCYTES # BLD AUTO: 0.03 10*3/MM3 (ref 0–0.05)
IMM GRANULOCYTES NFR BLD AUTO: 0.5 % (ref 0–0.5)
LYMPHOCYTES # BLD AUTO: 0.67 10*3/MM3 (ref 0.7–3.1)
LYMPHOCYTES NFR BLD AUTO: 10.2 % (ref 19.6–45.3)
MAGNESIUM SERPL-MCNC: 1.9 MG/DL (ref 1.6–2.4)
MCH RBC QN AUTO: 27.8 PG (ref 26.6–33)
MCHC RBC AUTO-ENTMCNC: 31.1 G/DL (ref 31.5–35.7)
MCV RBC AUTO: 89.4 FL (ref 79–97)
MONOCYTES # BLD AUTO: 0.45 10*3/MM3 (ref 0.1–0.9)
MONOCYTES NFR BLD AUTO: 6.9 % (ref 5–12)
NEUTROPHILS NFR BLD AUTO: 5.29 10*3/MM3 (ref 1.7–7)
NEUTROPHILS NFR BLD AUTO: 80.8 % (ref 42.7–76)
NT-PROBNP SERPL-MCNC: 1888 PG/ML (ref 0–900)
PLATELET # BLD AUTO: 125 10*3/MM3 (ref 140–450)
PMV BLD AUTO: 8.5 FL (ref 6–12)
POTASSIUM SERPL-SCNC: 3.4 MMOL/L (ref 3.5–5.2)
PROT SERPL-MCNC: 7.2 G/DL (ref 6–8.5)
RBC # BLD AUTO: 3.49 10*6/MM3 (ref 4.14–5.8)
SODIUM SERPL-SCNC: 139 MMOL/L (ref 136–145)
WBC NRBC COR # BLD AUTO: 6.54 10*3/MM3 (ref 3.4–10.8)

## 2024-11-26 PROCEDURE — 85025 COMPLETE CBC W/AUTO DIFF WBC: CPT | Performed by: INTERNAL MEDICINE

## 2024-11-26 PROCEDURE — 83735 ASSAY OF MAGNESIUM: CPT | Performed by: INTERNAL MEDICINE

## 2024-11-26 PROCEDURE — 80053 COMPREHEN METABOLIC PANEL: CPT | Performed by: INTERNAL MEDICINE

## 2024-11-26 PROCEDURE — 83880 ASSAY OF NATRIURETIC PEPTIDE: CPT | Performed by: INTERNAL MEDICINE
